# Patient Record
Sex: FEMALE | Race: WHITE | NOT HISPANIC OR LATINO | Employment: OTHER | URBAN - NONMETROPOLITAN AREA
[De-identification: names, ages, dates, MRNs, and addresses within clinical notes are randomized per-mention and may not be internally consistent; named-entity substitution may affect disease eponyms.]

---

## 2017-01-24 ENCOUNTER — APPOINTMENT (OUTPATIENT)
Dept: LAB | Facility: HOSPITAL | Age: 42
End: 2017-01-24

## 2017-01-24 ENCOUNTER — TRANSCRIBE ORDERS (OUTPATIENT)
Dept: ADMINISTRATIVE | Facility: HOSPITAL | Age: 42
End: 2017-01-24

## 2017-01-24 DIAGNOSIS — Z79.899 POLYPHARMACY: Primary | ICD-10-CM

## 2017-01-24 LAB
AMPHET+METHAMPHET UR QL: NEGATIVE
BARBITURATES UR QL SCN: POSITIVE
BENZODIAZ UR QL SCN: NEGATIVE
CANNABINOIDS SERPL QL: NEGATIVE
COCAINE UR QL: NEGATIVE
METHADONE UR QL SCN: NEGATIVE
OPIATES UR QL: NEGATIVE
PCP UR QL SCN: NEGATIVE

## 2017-01-24 PROCEDURE — 80307 DRUG TEST PRSMV CHEM ANLYZR: CPT | Performed by: PSYCHIATRY & NEUROLOGY

## 2017-02-01 ENCOUNTER — LAB (OUTPATIENT)
Dept: LAB | Facility: HOSPITAL | Age: 42
End: 2017-02-01

## 2017-02-01 ENCOUNTER — TRANSCRIBE ORDERS (OUTPATIENT)
Dept: ADMINISTRATIVE | Facility: HOSPITAL | Age: 42
End: 2017-02-01

## 2017-02-01 DIAGNOSIS — Z79.899 ENCOUNTER FOR LONG-TERM (CURRENT) USE OF MEDICATIONS: ICD-10-CM

## 2017-02-01 DIAGNOSIS — Z79.899 ENCOUNTER FOR LONG-TERM (CURRENT) USE OF MEDICATIONS: Primary | ICD-10-CM

## 2017-02-01 LAB
AMPHET+METHAMPHET UR QL: NEGATIVE
BARBITURATES UR QL SCN: NEGATIVE
BENZODIAZ UR QL SCN: NEGATIVE
CANNABINOIDS SERPL QL: NEGATIVE
COCAINE UR QL: NEGATIVE
METHADONE UR QL SCN: NEGATIVE
OPIATES UR QL: NEGATIVE
PCP UR QL SCN: NEGATIVE

## 2017-02-01 PROCEDURE — 80307 DRUG TEST PRSMV CHEM ANLYZR: CPT | Performed by: NURSE PRACTITIONER

## 2017-12-28 ENCOUNTER — TRANSCRIBE ORDERS (OUTPATIENT)
Dept: ADMINISTRATIVE | Facility: HOSPITAL | Age: 42
End: 2017-12-28

## 2017-12-28 DIAGNOSIS — M54.2 NECK PAIN: Primary | ICD-10-CM

## 2018-01-08 ENCOUNTER — HOSPITAL ENCOUNTER (OUTPATIENT)
Dept: MRI IMAGING | Facility: HOSPITAL | Age: 43
Discharge: HOME | End: 2018-01-08
Admitting: NURSE PRACTITIONER

## 2018-01-08 DIAGNOSIS — M54.2 NECK PAIN: ICD-10-CM

## 2018-01-08 PROCEDURE — 72141 MRI NECK SPINE W/O DYE: CPT

## 2018-02-07 ENCOUNTER — TRANSCRIBE ORDERS (OUTPATIENT)
Dept: PHYSICAL THERAPY | Facility: HOSPITAL | Age: 43
End: 2018-02-07

## 2018-02-07 DIAGNOSIS — M54.2 NECK PAIN: Primary | ICD-10-CM

## 2018-02-16 ENCOUNTER — HOSPITAL ENCOUNTER (OUTPATIENT)
Dept: PHYSICAL THERAPY | Facility: HOSPITAL | Age: 43
Setting detail: THERAPIES SERIES
Discharge: HOME | End: 2018-02-16

## 2018-02-16 DIAGNOSIS — M54.2 NECK PAIN: Primary | ICD-10-CM

## 2018-02-16 PROCEDURE — 97161 PT EVAL LOW COMPLEX 20 MIN: CPT | Performed by: PHYSICAL THERAPIST
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
## 2018-02-26 ENCOUNTER — HOSPITAL ENCOUNTER (OUTPATIENT)
Dept: PHYSICAL THERAPY | Facility: HOSPITAL | Age: 43
Setting detail: THERAPIES SERIES
Discharge: HOME | End: 2018-02-26

## 2018-02-26 DIAGNOSIS — M54.2 NECK PAIN: Primary | ICD-10-CM

## 2018-02-26 PROCEDURE — 97140 MANUAL THERAPY 1/> REGIONS: CPT

## 2018-02-26 NOTE — THERAPY TREATMENT NOTE
Outpatient Physical Therapy Ortho Treatment Note  [facility]     Patient Name: Maribel Whitaker  : 1975  MRN: [id number]  Today's Date: 2018      Visit Date: 2018    Visit Dx:    ICD-10-CM ICD-9-CM   1. Neck pain M54.2 723.1       Patient Active Problem List   Diagnosis   • Acute suppurative otitis media of right ear without spontaneous rupture of tympanic membrane   • Candida, oral        Past Medical History:   Diagnosis Date   • Acid reflux    • ADHD (attention deficit hyperactivity disorder)    • Allergic    • Anxiety    • Arthritis    • Depression    • Fibromyalgia    • Hypertension    • Migraines         Past Surgical History:   Procedure Laterality Date   •  SECTION     • CHOLECYSTECTOMY                               PT Assessment/Plan       18 1434       PT Assessment    Assessment Comments No goals have been achieved at this time; however, this is first visit since [facility]. Pt reports increased difficutly with turing her head to the left and rated her pain at a 5/10 pre treatment. With soft tissue mobilizations pt only able to tolerate very light pressure due to increased gaurding. This gaurding carried over to B UT and cervical region as well. Treatment also focused on improving cervicothoracic mobility. Pt very tender with prone extension mobilizations along upper and mid thoracic. More time spent on L ext mobs due to increased rotation in the upper thoracic. Pt educated on posture due to increased rounded shoudlers and hyperkyphotic posture.   -TR     PT Plan    PT Plan Comments Continue to focus on muscle relaxation as well as addressing left upper thoracic ROM.   -TR       User Key  (r) = Recorded By, (t) = Taken By, (c) = Cosigned By    Initials Name Provider Type    FLY Encinas PTA Physical Therapy Assistant                    Exercises       18 1431          Subjective Comments    Subjective Comments Pt reports continued pressure in her neck  as well as increased pain with rotation to the L.   -TR      Subjective Pain    Able to rate subjective pain? yes  -TR      Pre-Treatment Pain Level 4  -TR      Post-Treatment Pain Level 7  -TR        User Key  (r) = Recorded By, (t) = Taken By, (c) = Cosigned By    Initials Name Provider Type    TR Slime Encinas PTA Physical Therapy Assistant                        Manual Rx (last 36 hours)      Manual Treatments       02/26/18 1434          Manual Rx 1    Manual Rx 1 Location Mid-Upper thoracic   -TR      Manual Rx 1 Type IASTM with blue foam roller in prone   -TR      Manual Rx 1 Grade min   -TR      Manual Rx 1 Duration 8  -TR      Manual Rx 2    Manual Rx 2 Location Upper thoracic   -TR      Manual Rx 2 Type L unilateral PA's in prone   -TR      Manual Rx 2 Grade 1-2  -TR      Manual Rx 2 Duration 5  -TR      Manual Rx 3    Manual Rx 3 Location Mid-Upper thoracic   -TR      Manual Rx 3 Type extension mobilizations in prone   -TR      Manual Rx 3 Grade 1-2  -TR      Manual Rx 3 Duration 8  -TR      Manual Rx 4    Manual Rx 4 Location B UT and cervical paraspinals   -TR      Manual Rx 4 Type STM   -TR      Manual Rx 4 Grade min  -TR      Manual Rx 4 Duration 4  -TR      Manual Rx 5    Manual Rx 5 Location Cervical   -TR      Manual Rx 5 Type Traction and suboccipital release   -TR      Manual Rx 5 Grade 2  -TR      Manual Rx 5 Duration 10  -TR      Manual Rx 6    Manual Rx 6 Location Cervical   -TR      Manual Rx 6 Type PROM in B rotation   -TR      Manual Rx 6 Grade min  -TR      Manual Rx 6 Duration 3  -TR      Manual Rx 7    Manual Rx 7 Location Thoracic towel roll stretch   -TR      Manual Rx 7 Duration 3  -TR        User Key  (r) = Recorded By, (t) = Taken By, (c) = Cosigned By    Initials Name Provider Type    FLY Encinas PTA Physical Therapy Assistant                PT OP Goals       02/26/18 1434       Long Term Goals    LTG Date to Achieve 03/16/18  -TR     LTG 1 Full and symmetrical  active cervical rotation  -TR     LTG 1 Progress Ongoing  -TR     LTG 1 Progress Comments Limited today with L worse than R   -TR     LTG 2 Cervical active extension to 75%  -TR     LTG 2 Progress Ongoing  -TR     LTG 3 No neck pain except occasional minor twinges no more than 1-2/10  -TR     LTG 3 Progress Ongoing  -TR     LTG 4 Ind with HEP for posture and flexibility  -TR     LTG 4 Progress Ongoing  -TR     Time Calculation    PT Goal Re-Cert Due Date 03/18/18  -TR       User Key  (r) = Recorded By, (t) = Taken By, (c) = Cosigned By    Initials Name Provider Type    TR Slime Encinas PTA Physical Therapy Assistant          Therapy Education  Given: HEP  Program: Reinforced  How Provided: Verbal  Provided to: Patient  Level of Understanding: Verbalized              Time Calculation:   Start Time: 1434  Stop Time: 1517  Time Calculation (min): 43 min  Total Timed Code Minutes- PT: 43 minute(s)    Therapy Charges for Today     Code Description Service Date Service Provider Modifiers Qty    41023248840 HC PT MANUAL THERAPY EA 15 MIN 2/26/2018 Slime Encinas PTA GP 3                    Slime Encinas PTA  2/26/2018

## 2018-02-28 ENCOUNTER — HOSPITAL ENCOUNTER (OUTPATIENT)
Dept: PHYSICAL THERAPY | Facility: HOSPITAL | Age: 43
Setting detail: THERAPIES SERIES
Discharge: HOME | End: 2018-02-28

## 2018-02-28 DIAGNOSIS — M54.2 NECK PAIN: Primary | ICD-10-CM

## 2018-02-28 PROCEDURE — 97110 THERAPEUTIC EXERCISES: CPT

## 2018-02-28 PROCEDURE — 97140 MANUAL THERAPY 1/> REGIONS: CPT

## 2018-03-05 ENCOUNTER — HOSPITAL ENCOUNTER (OUTPATIENT)
Dept: PHYSICAL THERAPY | Facility: HOSPITAL | Age: 43
Setting detail: THERAPIES SERIES
Discharge: HOME | End: 2018-03-05

## 2018-03-05 DIAGNOSIS — M54.2 NECK PAIN: Primary | ICD-10-CM

## 2018-03-05 PROCEDURE — 97110 THERAPEUTIC EXERCISES: CPT

## 2018-03-05 PROCEDURE — 97140 MANUAL THERAPY 1/> REGIONS: CPT

## 2018-03-05 NOTE — THERAPY TREATMENT NOTE
Outpatient Physical Therapy Ortho Treatment Note  [facility]     Patient Name: Maribel Whitaker  : 1975  MRN: [id number]  Today's Date: 3/5/2018      Visit Date: 2018    Visit Dx:    ICD-10-CM ICD-9-CM   1. Neck pain M54.2 723.1       Patient Active Problem List   Diagnosis   • Acute suppurative otitis media of right ear without spontaneous rupture of tympanic membrane   • Candida, oral        Past Medical History:   Diagnosis Date   • Acid reflux    • ADHD (attention deficit hyperactivity disorder)    • Allergic    • Anxiety    • Arthritis    • Depression    • Fibromyalgia    • Hypertension    • Migraines         Past Surgical History:   Procedure Laterality Date   •  SECTION     • CHOLECYSTECTOMY                               PT Assessment/Plan       18 1347       PT Assessment    Assessment Comments Pt continues to have increased pain as well as stiffness throughout cervical and thoracic spine.  Pt was very gaurded today and not able to tolerate PROM to improve cervical rotation. Attempted light strengthening of scapular muscels however pt had slight increase in pain.   -TR     PT Plan    PT Plan Comments Continue with manual techniques and decreasing gaurding.   -TR       User Key  (r) = Recorded By, (t) = Taken By, (c) = Cosigned By    Initials Name Provider Type    TR Slime Encinas PTA Physical Therapy Assistant                    Exercises       18 1347          Subjective Pain    Able to rate subjective pain? yes  -TR      Pre-Treatment Pain Level 4  -TR      Post-Treatment Pain Level 6  -TR      Subjective Pain Comment B shoulders and to mid forearm biilaterally.   -TR      Exercise 1    Exercise Name 1 Hooklying towel stretch   -TR      Cueing 1 Verbal  -TR      Time (Minutes) 1 3  -TR      Exercise 2    Exercise Name 2 Hooklying B UE horizontal abduction with yellow T-band  -TR      Cueing 2 Verbal  -TR      Sets 2 2  -TR      Reps 2 10  -TR      Additional  Comments Limited ROM due to pain   -TR      Exercise 3    Exercise Name 3 Seated UE phasic no band   -TR      Cueing 3 Verbal;Tactile  -TR      Sets 3 1  -TR      Reps 3 10  -TR      Additional Comments Tactile cues for proper form   -TR      Exercise 4    Exercise Name 4 B pec stretch at doorway  -TR      Cueing 4 Verbal;Demo  -TR      Sets 4 3  -TR      Reps 4 20  -TR      Additional Comments Added to HEP   -TR        User Key  (r) = Recorded By, (t) = Taken By, (c) = Cosigned By    Initials Name Provider Type    TR Slime Encinas PTA Physical Therapy Assistant                        Manual Rx (last 36 hours)      Manual Treatments       03/05/18 1347          Manual Rx 1    Manual Rx 1 Location  thoracic   -TR      Manual Rx 1 Type IASTM with blue foam roller in prone   -TR      Manual Rx 1 Grade min   -TR      Manual Rx 1 Duration 8  -TR      Manual Rx 2    Manual Rx 2 Location B UT and LS  -TR      Manual Rx 2 Type STM  -TR      Manual Rx 2 Grade min  -TR      Manual Rx 2 Duration 5  -TR      Manual Rx 3    Manual Rx 3 Location CT and Upper thoracic   -TR      Manual Rx 3 Type extension mobilizations in prone   -TR      Manual Rx 3 Grade 1-2 sustained  -TR      Manual Rx 3 Duration 5  -TR      Manual Rx 4    Manual Rx 4 Location suboccipital release with STM and gentle traction  -TR      Manual Rx 4 Grade 1-2 sustained  -TR      Manual Rx 4 Duration 5  -TR      Manual Rx 5    Manual Rx 5 Location lower cervical traction  -TR      Manual Rx 5 Type manual traction   -TR      Manual Rx 5 Grade 1-2  -TR      Manual Rx 5 Duration 5  -TR      Manual Rx 6    Manual Rx 6 Location --  -TR      Manual Rx 6 Type --  -TR      Manual Rx 6 Grade --  -TR        User Key  (r) = Recorded By, (t) = Taken By, (c) = Cosigned By    Initials Name Provider Type    TR Slime Encinas PTA Physical Therapy Assistant                PT OP Goals       03/05/18 1347       Long Term Goals    LTG Date to Achieve 03/16/18  -TR      LTG 1 Full and symmetrical active cervical rotation  -TR     LTG 1 Progress Ongoing  -TR     LTG 1 Progress Comments Pt too gaurded with PROM today   -TR     LTG 2 Cervical active extension to 75%  -TR     LTG 2 Progress Ongoing  -TR     LTG 3 No neck pain except occasional minor twinges no more than 1-2/10  -TR     LTG 3 Progress Ongoing  -TR     LTG 4 Ind with HEP for posture and flexibility  -TR     LTG 4 Progress Ongoing  -TR     Time Calculation    PT Goal Re-Cert Due Date 03/18/18  -TR       User Key  (r) = Recorded By, (t) = Taken By, (c) = Cosigned By    Initials Name Provider Type    TR Slime Encinas PTA Physical Therapy Assistant          Therapy Education  Education Details: Doorway pec stretch   Given: HEP  Program: New  How Provided: Verbal, Demonstration  Provided to: Patient  Level of Understanding: Verbalized, Demonstrated              Time Calculation:   Start Time: 1347  Stop Time: 1430  Time Calculation (min): 43 min  Total Timed Code Minutes- PT: 43 minute(s)    Therapy Charges for Today     Code Description Service Date Service Provider Modifiers Qty    71738742877 HC PT MANUAL THERAPY EA 15 MIN 3/5/2018 Slime Encinas PTA GP 2    17300424923 HC PT THER PROC EA 15 MIN 3/5/2018 Slime Encinas PTA GP 1                    Slime Encinas PTA  3/5/2018

## 2018-03-07 ENCOUNTER — HOSPITAL ENCOUNTER (OUTPATIENT)
Dept: PHYSICAL THERAPY | Facility: HOSPITAL | Age: 43
Setting detail: THERAPIES SERIES
Discharge: HOME | End: 2018-03-07

## 2018-03-07 DIAGNOSIS — M54.2 NECK PAIN: Primary | ICD-10-CM

## 2018-03-07 PROCEDURE — 97140 MANUAL THERAPY 1/> REGIONS: CPT

## 2018-03-07 PROCEDURE — 97110 THERAPEUTIC EXERCISES: CPT

## 2018-03-07 NOTE — THERAPY TREATMENT NOTE
Outpatient Physical Therapy Ortho Treatment Note  [facility]     Patient Name: Maribel Whitaker  : 1975  MRN: [id number]  Today's Date: 3/7/2018      Visit Date: 2018    Visit Dx:    ICD-10-CM ICD-9-CM   1. Neck pain M54.2 723.1       Patient Active Problem List   Diagnosis   • Acute suppurative otitis media of right ear without spontaneous rupture of tympanic membrane   • Candida, oral        Past Medical History:   Diagnosis Date   • Acid reflux    • ADHD (attention deficit hyperactivity disorder)    • Allergic    • Anxiety    • Arthritis    • Depression    • Fibromyalgia    • Hypertension    • Migraines         Past Surgical History:   Procedure Laterality Date   •  SECTION     • CHOLECYSTECTOMY                               PT Assessment/Plan       18 1431       PT Assessment    Assessment Comments Pt reports feeling better today pre treatment; however, tingling and burning continues in BUE down into her fingertips. Pt reports  increased pain with manual techniques including cervical traction and thoracic extension mobilization post treatment. Pt's pain also increased to a 6 post treatment. Pt continues to present with poor posture and rounded shoulders which continues to exacerbate her problems.   -TR     PT Plan    PT Plan Comments Continue with manual techniques to improve mobility. Focus on techniques to improve alignment in upper thoracic and left upper thoracic rotation as tolerated.   -TR       User Key  (r) = Recorded By, (t) = Taken By, (c) = Cosigned By    Initials Name Provider Type    FLY Encinas PTA Physical Therapy Assistant                    Exercises       18 1431          Subjective Comments    Subjective Comments Pt reports some soreness last night however reports improvement overall.   -TR      Subjective Pain    Able to rate subjective pain? yes  -TR      Pre-Treatment Pain Level 3  -TR      Post-Treatment Pain Level 6  -TR      Subjective  Pain Comment B shoulder with constant burning/tingling into B arms and fingertips   -TR      Exercise 1    Exercise Name 1 Hooklying towel stretch   -TR      Cueing 1 Verbal  -TR      Time (Minutes) 1 3  -TR      Exercise 2    Exercise Name 2 Hooklying B UE horizontal abduction with yellow T-band  -TR      Cueing 2 Verbal  -TR      Sets 2 2  -TR      Reps 2 10  -TR      Additional Comments Better ROM today  -TR      Exercise 3    Exercise Name 3 Hooklying BUE diagonals   -TR      Cueing 3 Verbal;Tactile  -TR      Sets 3 2  -TR      Reps 3 10  -TR        User Key  (r) = Recorded By, (t) = Taken By, (c) = Cosigned By    Initials Name Provider Type    TR Slime Encinas PTA Physical Therapy Assistant                        Manual Rx (last 36 hours)      Manual Treatments       03/07/18 1431          Manual Rx 1    Manual Rx 1 Location  thoracic   -TR      Manual Rx 1 Type IASTM with blue foam roller in prone   -TR      Manual Rx 1 Grade min   -TR      Manual Rx 1 Duration 8  -TR      Manual Rx 2    Manual Rx 2 Location --  -TR      Manual Rx 2 Type --  -TR      Manual Rx 2 Grade --  -TR      Manual Rx 2 Duration --  -TR      Manual Rx 3    Manual Rx 3 Location CT and Upper thoracic   -TR      Manual Rx 3 Type extension mobilizations in prone   -TR      Manual Rx 3 Grade 2-3 sustained  -TR      Manual Rx 3 Duration 12  -TR      Manual Rx 4    Manual Rx 4 Location suboccipital release with STM and gentle traction  -TR      Manual Rx 4 Grade 2 sustained  -TR      Manual Rx 4 Duration 7  -TR      Manual Rx 5    Manual Rx 5 Location lower cervical traction  -TR      Manual Rx 5 Type manual traction   -TR      Manual Rx 5 Grade 1-2  -TR      Manual Rx 5 Duration 7  -TR        User Key  (r) = Recorded By, (t) = Taken By, (c) = Cosigned By    Initials Name Provider Type    TR Slime Encinas PTA Physical Therapy Assistant                PT OP Goals       03/07/18 1431 03/07/18 1400    Long Term Goals    LTG Date to  Achieve  03/16/18  -TR    LTG 1  Full and symmetrical active cervical rotation  -TR    LTG 1 Progress  Ongoing  -TR    LTG 2  Cervical active extension to 75%  -TR    LTG 2 Progress  Ongoing  -TR    LTG 3  No neck pain except occasional minor twinges no more than 1-2/10  -TR    LTG 3 Progress  Ongoing  -TR    LTG 3 Progress Comments  3/10 pre treatment today   -TR    LTG 4  Ind with HEP for posture and flexibility  -TR    LTG 4 Progress  Ongoing  -TR    Time Calculation    PT Goal Re-Cert Due Date 03/15/18  -TR 03/18/18  -TR      User Key  (r) = Recorded By, (t) = Taken By, (c) = Cosigned By    Initials Name Provider Type    TR Slime Encinas PTA Physical Therapy Assistant          Therapy Education  Given: HEP, Symptoms/condition management  Program: Reinforced  How Provided: Verbal  Provided to: Patient  Level of Understanding: Verbalized              Time Calculation:   Start Time: 1431  Stop Time: 1515  Time Calculation (min): 44 min  Total Timed Code Minutes- PT: 44 minute(s)    Therapy Charges for Today     Code Description Service Date Service Provider Modifiers Qty    26380633565 HC PT MANUAL THERAPY EA 15 MIN 3/7/2018 Slime Encinas PTA GP 2    83281191399 HC PT THER PROC EA 15 MIN 3/7/2018 Slime Encinas PTA GP 1                    Slime Encinas PTA  3/7/2018

## 2018-03-09 ENCOUNTER — HOSPITAL ENCOUNTER (OUTPATIENT)
Dept: PHYSICAL THERAPY | Facility: HOSPITAL | Age: 43
Setting detail: THERAPIES SERIES
Discharge: HOME | End: 2018-03-09

## 2018-03-09 DIAGNOSIS — M54.2 NECK PAIN: Primary | ICD-10-CM

## 2018-03-09 PROCEDURE — 97140 MANUAL THERAPY 1/> REGIONS: CPT | Performed by: PHYSICAL THERAPIST

## 2018-03-09 NOTE — THERAPY TREATMENT NOTE
Outpatient Physical Therapy Orth[facility] Treatment Note  [facility]     Patient Name: Maribel Whitaker  : 1975  MRN: [id number]  Today's Date: 3/9/2018      Visit Date: 2018    Visit Dx:    ICD-10-CM ICD-9-CM   1. Neck pain M54.2 723.1       Patient Active Problem List   Diagnosis   • Acute suppurative otitis media of right ear without spontaneous rupture of tympanic membrane   • Candida, oral        Past Medical History:   Diagnosis Date   • Acid reflux    • ADHD (attention deficit hyperactivity disorder)    • Allergic    • Anxiety    • Arthritis    • Depression    • Fibromyalgia    • Hypertension    • Migraines         Past Surgical History:   Procedure Laterality Date   •  SECTION     • CHOLECYSTECTOMY                               PT Assessment/Plan       18 1300       PT Assessment    Assessment Comments She feels like she continues to improve. She still tends to rest in a kyphotic posture. The importance of posture was emphasized today in order to keep the strain off her neck.  -TB     PT Plan    PT Plan Comments Cont to progress thoracic flexibility and postural stability.  -TB       User Key  (r) = Recorded By, (t) = Taken By, (c) = Cosigned By    Initials Name Provider Type    PRANAV Fatima, PT Physical Therapist                    Exercises       18 1300          Subjective Comments    Subjective Comments She feels like she's doing well. Overall, she feels better with more flexibility.  -TB      Subjective Pain    Pre-Treatment Pain Level 2  -TB        User Key  (r) = Recorded By, (t) = Taken By, (c) = Cosigned By    Initials Name Provider Type    PRANAV Fatima, PT Physical Therapist                        Manual Rx (last 36 hours)      Manual Treatments       18 1300          Manual Rx 1    Manual Rx 1 Location  thoracic   -TB      Manual Rx 1 Type ext mob with foam roll  -TB      Manual Rx 1 Grade mod  -TB      Manual Rx 1 Duration 8  -TB      Manual Rx 2     Manual Rx 2 Location B UT and LS  -TB      Manual Rx 2 Type STM  -TB      Manual Rx 2 Grade min  -TB      Manual Rx 2 Duration 10  -TB      Manual Rx 3    Manual Rx 3 Location CT and Upper thoracic   -TB      Manual Rx 3 Type extension mobilizations in prone   -TB      Manual Rx 3 Grade 3 sustained  -TB      Manual Rx 3 Duration 12  -TB      Manual Rx 4    Manual Rx 4 Location suboccipital release with STM and gentle traction  -TB      Manual Rx 4 Type STM   -TB      Manual Rx 4 Grade 2 sustained  -TB      Manual Rx 4 Duration 7  -TB      Manual Rx 5    Manual Rx 5 Location lower cervical traction  -TB      Manual Rx 5 Type manual traction   -TB      Manual Rx 5 Grade 1-2  -TB      Manual Rx 5 Duration 7  -TB      Manual Rx 6    Manual Rx 6 Location --  -TB      Manual Rx 6 Type --  -TB      Manual Rx 6 Grade --  -TB      Manual Rx 6 Duration --  -TB      Manual Rx 7    Manual Rx 7 Location --  -TB      Manual Rx 7 Duration --  -TB        User Key  (r) = Recorded By, (t) = Taken By, (c) = Cosigned By    Initials Name Provider Type    PRANAV Fatima, PT Physical Therapist                PT OP Goals       03/09/18 1300       Long Term Goals    LTG Date to Achieve 03/16/18  -TB     LTG 1 Full and symmetrical active cervical rotation  -TB     LTG 1 Progress Ongoing  -TB     LTG 2 Cervical active extension to 75%  -TB     LTG 2 Progress Ongoing  -TB     LTG 3 No neck pain except occasional minor twinges no more than 1-2/10  -TB     LTG 3 Progress Ongoing  -TB     LTG 3 Progress Comments 2/10 today  -TB     LTG 4 Ind with HEP for posture and flexibility  -TB     LTG 4 Progress Ongoing  -TB     LTG 4 Progress Comments emphasized W's and posture  -TB     Time Calculation    PT Goal Re-Cert Due Date 03/15/18  -TB       User Key  (r) = Recorded By, (t) = Taken By, (c) = Cosigned By    Initials Name Provider Type    PRANAV Fatima, PT Physical Therapist          Therapy Education  Education Details: posture, Ws  Given:  HEP, Symptoms/condition management  Program: Reinforced  How Provided: Verbal  Provided to: Patient  Level of Understanding: Verbalized              Time Calculation:   Start Time: 1300  Stop Time: 1345  Time Calculation (min): 45 min  Total Timed Code Minutes- PT: 45 minute(s)    Therapy Charges for Today     Code Description Service Date Service Provider Modifiers Qty    74526370992 HC PT MANUAL THERAPY EA 15 MIN 3/9/2018 Dedrick Fatima, PT GP 3                    Dedrick Fatima, PT  3/9/2018

## 2018-03-12 ENCOUNTER — HOSPITAL ENCOUNTER (OUTPATIENT)
Dept: PHYSICAL THERAPY | Facility: HOSPITAL | Age: 43
Setting detail: THERAPIES SERIES
Discharge: HOME | End: 2018-03-12

## 2018-03-12 DIAGNOSIS — M54.2 NECK PAIN: Primary | ICD-10-CM

## 2018-03-12 PROCEDURE — 97110 THERAPEUTIC EXERCISES: CPT | Performed by: PHYSICAL THERAPIST

## 2018-03-12 PROCEDURE — 97140 MANUAL THERAPY 1/> REGIONS: CPT | Performed by: PHYSICAL THERAPIST

## 2018-03-12 NOTE — THERAPY PROGRESS REPORT/RE-CERT
Outpatient Physical Therapy Ortho Progress Note  [facility]     Patient Name: Maribel Whitaker  : 1975  MRN: [id number]  Today's Date: 3/12/2018      Visit Date: 2018    Visit Dx:    ICD-10-CM ICD-9-CM   1. Neck pain M54.2 723.1       Patient Active Problem List   Diagnosis   • Acute suppurative otitis media of right ear without spontaneous rupture of tympanic membrane   • Candida, oral        Past Medical History:   Diagnosis Date   • Acid reflux    • ADHD (attention deficit hyperactivity disorder)    • Allergic    • Anxiety    • Arthritis    • Depression    • Fibromyalgia    • Hypertension    • Migraines         Past Surgical History:   Procedure Laterality Date   •  SECTION     • CHOLECYSTECTOMY                               PT Assessment/Plan     Row Name 18 1300          PT Assessment    Functional Limitations Limitation in home management;Performance in leisure activities  -TB     Impairments Pain;Joint mobility;Impaired flexibility;Posture;Range of motion  -TB     Assessment Comments Overall, she is better with pain less intense and less frequent. Our emphasis has been on improving her posture with mobilizations and exercises. She still tends to be severely kyphotic.  -TB     Rehab Potential Good  -TB     Patient/caregiver participated in establishment of treatment plan and goals Yes  -TB     Patient would benefit from skilled therapy intervention Yes  -TB        PT Plan    PT Frequency 2x/week;3x/week  -TB     Predicted Duration of Therapy Intervention (OT Eval) 4 weeks  -TB     Planned CPT's? PT THER PROC EA 15 MIN: 51655;PT MANUAL THERAPY EA 15 MIN: 91361;PT ELECTRICAL STIM UNATTEND: ;PT ELECTRICAL STIM ATTD EA 15 MIN: 29774;PT ULTRASOUND EA 15 MIN: 46406;PT TRACTION CERVICAL: 06026  -TB     PT Plan Comments We will continue to promote improved posture and mobility with mobilizations and flexibility and stability exs.  -TB        Clinical Impression    Predicted Duration  "of Therapy Intervention (days/wks) 4 weeks  -TB       User Key  (r) = Recorded By, (t) = Taken By, (c) = Cosigned By    Initials Name Provider Type    TB Dedrick Fatima, PT Physical Therapist                    Exercises     Row Name 03/12/18 1300             Subjective Comments    Subjective Comments She says she is feeling \"pretty good\" with her pain mostly in her lower neck and occiput.  -TB         Subjective Pain    Pre-Treatment Pain Level 3  -TB         Exercise 1    Exercise Name 1 addressed goals  -TB      Time 1 5  -TB         Exercise 2    Exercise Name 2 went over HEP for standing thoracic and pect stretch  -TB      Time 2 3  -TB        User Key  (r) = Recorded By, (t) = Taken By, (c) = Cosigned By    Initials Name Provider Type    TB Dedrick Fatima, PT Physical Therapist                        Manual Rx (last 36 hours)      Manual Treatments     Row Name 03/12/18 1305             Manual Rx 1    Manual Rx 1 Location  thoracic   -TB      Manual Rx 1 Type ext mob with foam roll  -TB      Manual Rx 1 Grade mod  -TB      Manual Rx 1 Duration 8  -TB         Manual Rx 2    Manual Rx 2 Location B UT and LS  -TB      Manual Rx 2 Type STM  -TB      Manual Rx 2 Grade min  -TB      Manual Rx 2 Duration 10  -TB         Manual Rx 3    Manual Rx 3 Location CT and Upper thoracic   -TB      Manual Rx 3 Type extension mobilizations in prone   -TB      Manual Rx 3 Grade 3 sustained  -TB      Manual Rx 3 Duration 12  -TB         Manual Rx 4    Manual Rx 4 Location --  -TB      Manual Rx 4 Type --  -TB      Manual Rx 4 Grade --  -TB      Manual Rx 4 Duration --  -TB         Manual Rx 5    Manual Rx 5 Location --  -TB      Manual Rx 5 Type --  -TB      Manual Rx 5 Grade --  -TB      Manual Rx 5 Duration --  -TB         Manual Rx 6    Manual Rx 6 Location --  -TB      Manual Rx 6 Type --  -TB      Manual Rx 6 Grade --  -TB      Manual Rx 6 Duration --  -TB         Manual Rx 7    Manual Rx 7 Location --  -TB      Manual Rx 7 " Duration 3  -TB        User Key  (r) = Recorded By, (t) = Taken By, (c) = Cosigned By    Initials Name Provider Type    TB Dedrick Fatima, PT Physical Therapist                PT OP Goals     Row Name 03/12/18 1300          Long Term Goals    LTG Date to Achieve 03/16/18  -TB     LTG 1 Full and symmetrical active cervical rotation  -TB     LTG 1 Progress Ongoing  -TB     LTG 1 Progress Comments Right rotation: 90%; left rotation 80% with no pain  -TB     LTG 2 Cervical active extension to 75%  -TB     LTG 2 Progress Ongoing  -TB     LTG 2 Progress Comments 505  -TB     LTG 3 No neck pain except occasional minor twinges no more than 1-2/10  -TB     LTG 3 Progress Ongoing  -TB     LTG 3 Progress Comments 3/10 today; feels pain 50-60%  -TB     LTG 4 Ind with HEP for posture and flexibility  -TB     LTG 4 Progress Ongoing  -TB     LTG 4 Progress Comments emphasizing posture and flexibility through her chest and thoracic  -TB        Time Calculation    PT Goal Re-Cert Due Date 04/11/18  -TB       User Key  (r) = Recorded By, (t) = Taken By, (c) = Cosigned By    Initials Name Provider Type    TB Dedrick Fatima, PT Physical Therapist          Therapy Education  Given: HEP, Symptoms/condition management  Program: Reinforced  How Provided: Verbal  Provided to: Patient  Level of Understanding: Verbalized              Time Calculation:        Therapy Charges for Today     Code Description Service Date Service Provider Modifiers Qty    22634053635 HC PT THER PROC EA 15 MIN 3/12/2018 Dedrick Fatima, PT GP 1    22226592674 HC PT MANUAL THERAPY EA 15 MIN 3/12/2018 Dedrick Fatima, PT GP 2                    Dedrick Fatima, PT  3/12/2018

## 2018-03-14 ENCOUNTER — APPOINTMENT (OUTPATIENT)
Dept: PHYSICAL THERAPY | Facility: HOSPITAL | Age: 43
End: 2018-03-14

## 2018-03-19 ENCOUNTER — APPOINTMENT (OUTPATIENT)
Dept: PHYSICAL THERAPY | Facility: HOSPITAL | Age: 43
End: 2018-03-19

## 2018-03-19 ENCOUNTER — HOSPITAL ENCOUNTER (OUTPATIENT)
Dept: PHYSICAL THERAPY | Facility: HOSPITAL | Age: 43
Setting detail: THERAPIES SERIES
Discharge: HOME | End: 2018-03-19

## 2018-03-19 DIAGNOSIS — M54.2 NECK PAIN: Primary | ICD-10-CM

## 2018-03-19 PROCEDURE — 97140 MANUAL THERAPY 1/> REGIONS: CPT

## 2018-03-19 NOTE — THERAPY TREATMENT NOTE
Outpatient Physical Therapy Ortho Treatment Note  [facility]     Patient Name: Maribel Whitaker  : 1975  MRN: [id number]  Today's Date: 3/19/2018      Visit Date: 2018    Visit Dx:    ICD-10-CM ICD-9-CM   1. Neck pain M54.2 723.1       Patient Active Problem List   Diagnosis   • Acute suppurative otitis media of right ear without spontaneous rupture of tympanic membrane   • Candida, oral        Past Medical History:   Diagnosis Date   • Acid reflux    • ADHD (attention deficit hyperactivity disorder)    • Allergic    • Anxiety    • Arthritis    • Depression    • Fibromyalgia    • Hypertension    • Migraines         Past Surgical History:   Procedure Laterality Date   •  SECTION     • CHOLECYSTECTOMY                               PT Assessment/Plan     Row Name 18 1345 18 1318       PT Assessment    Assessment Comments Pt was 45 minutes early to her appointment due to scheduling error; therefore, pt was seen 30 minutes by delio gardner and then 20 by Me. Last part of treatment focused on scapular strengthening and improving posture. Pt required assist with serratus punches as well as seated UE pahsic due to decreased strength as well as poor control and form. Post treatment pt rated her pain at a 3/10.   -TR Patient reports she continues to have intermittent episodes where she feels her neck lock ups on her during rotation, which lasted about 30 seconds.  She does continue to have decreased mobility in the cervicothoracic region with increased guarding with PROM.    Manual techniques did not relieve her symptoms today, but she did report slight increase of pain following traction today.  -ZHANE       PT Plan    PT Plan Comments  -- We will continue to work on cervicothoracic mobility.  We will also progress with more of active approach for poture moving forward.   -ZHANE      User Key  (r) = Recorded By, (t) = Taken By, (c) = Cosigned By    Initials Name Provider Type    ZHANE WEN  HELLEN Rhoades Physical Therapy Assistant    TR Slime Encinas PTA Physical Therapy Assistant                    Exercises     Row Name 03/19/18 1922             Subjective Comments    Subjective Comments Patient reports when she turns to either direction, she feels her neck locks up and she can't move for about 30 secons.  Then she gradually starts moving again. She reports this happens intermittently.  However, she feels her flexibilty is improving.  -ZHANE         Subjective Pain    Able to rate subjective pain? yes  -ZHANE      Pre-Treatment Pain Level 5  -ZHANE      Post-Treatment Pain Level 3  -TR         Exercise 1    Exercise Name 1 Hooklying towel stretch   -TR      Cueing 1 Verbal  -TR      Time 1 5  -TR      Additional Comments min OP  -TR         Exercise 2    Exercise Name 2 Hooklying B UE horizontal abduction with yellow T-band  -TR      Cueing 2 Verbal  -TR      Sets 2 2  -TR      Reps 2 15  -TR         Exercise 3    Exercise Name 3 Seated UE phasic with no band   -TR      Cueing 3 Verbal  -TR      Sets 3 2  -TR      Reps 3 10  -TR         Exercise 4    Exercise Name 4 Seated W's  -TR      Cueing 4 Verbal  -TR      Sets 4 2  -TR      Reps 4 10  -TR      Additional Comments Reviewed for HEP   -TR         Exercise 5    Exercise Name 5 B serratus punches in supine  -TR      Cueing 5 Verbal  -TR      Sets 5 2  -TR      Reps 5 10 BUE  -TR        User Key  (r) = Recorded By, (t) = Taken By, (c) = Cosigned By    Initials Name Provider Type    ZHANE Rhoades PTA Physical Therapy Assistant    TR Slime Encinas PTA Physical Therapy Assistant                        Manual Rx (last 36 hours)      Manual Treatments     Row Name 03/19/18 1321             Manual Rx 1    Manual Rx 1 Location B UT and LS  -ZHANE      Manual Rx 1 Type STM  -ZHANE      Manual Rx 1 Grade min  -ZHANE      Manual Rx 1 Duration 6  -ZHANE         Manual Rx 2    Manual Rx 2 Location CT and Upper thoracic   -ZHANE      Manual Rx 2 Type extension  mobilizations in prone   -ZHANE      Manual Rx 2 Grade 2 sustained  -ZHANE      Manual Rx 2 Duration 5  -ZHANE         Manual Rx 3    Manual Rx 3 Location suboccipital release with STM and gentle traction  -ZHANE      Manual Rx 3 Grade 1-2 and min STM  -ZHANE      Manual Rx 3 Duration 5  -ZHANE         Manual Rx 4    Manual Rx 4 Location lower cervical traction  -ZHANE      Manual Rx 4 Type manual traction   -ZHANE      Manual Rx 4 Grade 1-2  -ZHANE      Manual Rx 4 Duration 5   she reported increased pain after treatment  -ZHANE         Manual Rx 5    Manual Rx 5 Location cervical PROM  -ZHANE      Manual Rx 5 Grade x8 reps each  -ZHANE      Manual Rx 5 Duration 2   pt.. had increased guarding with PROM  -ZHANE        User Key  (r) = Recorded By, (t) = Taken By, (c) = Cosigned By    Initials Name Provider Type    ZHANE Rhoades PTA Physical Therapy Assistant                PT OP Goals     Row Name 03/19/18 1345 03/19/18 1318       Long Term Goals    LTG Date to Achieve  -- 03/16/18  -TR    LTG 1  -- Full and symmetrical active cervical rotation  -TR    LTG 1 Progress  -- Ongoing  -TR    LTG 1 Progress Comments  -- She was having pain with PROM cervical rotation due to increased guarding with PROM today  -ZHANE    LTG 2  -- Cervical active extension to 75%  -TR    LTG 2 Progress  -- Ongoing  -TR    LTG 3  -- No neck pain except occasional minor twinges no more than 1-2/10  -TR    LTG 3 Progress  -- Ongoing  -TR    LTG 4  -- Ind with HEP for posture and flexibility  -TR    LTG 4 Progress  -- Ongoing  -TR    LTG 4 Progress Comments  -- Reviewed HEP today for proper form   -TR       Time Calculation    PT Goal Re-Cert Due Date 04/11/18  -TR 04/11/18  -TR      User Key  (r) = Recorded By, (t) = Taken By, (c) = Cosigned By    Initials Name Provider Type    ZHANE Rhoades PTA Physical Therapy Assistant    FLY Encinas PTA Physical Therapy Assistant          Therapy Education  Education Details: Reviwed for proper form   Given: HEP  Program:  Reinforced  How Provided: Verbal, Demonstration  Provided to: Patient  Level of Understanding: Verbalized, Demonstrated              Time Calculation:   Start Time: 1345  Stop Time: 1406  Time Calculation (min): 21 min  PT Non-Billable Time (min): 2 min  Total Timed Code Minutes- PT: 19 minute(s)    Therapy Charges for Today     Code Description Service Date Service Provider Modifiers Qty    45170046341 HC PT MANUAL THERAPY EA 15 MIN 3/19/2018 Slime Encinas PTA GP 1                    Slime Encinas PTA  3/19/2018

## 2018-03-21 ENCOUNTER — HOSPITAL ENCOUNTER (OUTPATIENT)
Dept: PHYSICAL THERAPY | Facility: HOSPITAL | Age: 43
Setting detail: THERAPIES SERIES
Discharge: HOME | End: 2018-03-21

## 2018-03-21 DIAGNOSIS — M54.2 NECK PAIN: Primary | ICD-10-CM

## 2018-03-21 PROCEDURE — 97140 MANUAL THERAPY 1/> REGIONS: CPT | Performed by: PHYSICAL THERAPIST

## 2018-03-21 PROCEDURE — 97110 THERAPEUTIC EXERCISES: CPT | Performed by: PHYSICAL THERAPIST

## 2018-03-21 NOTE — THERAPY TREATMENT NOTE
Outpatient Physical Therapy Ortho Treatment Note  [facility]     Patient Name: Maribel Whitaker  : 1975  MRN: [id number]  Today's Date: 3/21/2018      Visit Date: 2018    Visit Dx:    ICD-10-CM ICD-9-CM   1. Neck pain M54.2 723.1       Patient Active Problem List   Diagnosis   • Acute suppurative otitis media of right ear without spontaneous rupture of tympanic membrane   • Candida, oral        Past Medical History:   Diagnosis Date   • Acid reflux    • ADHD (attention deficit hyperactivity disorder)    • Allergic    • Anxiety    • Arthritis    • Depression    • Fibromyalgia    • Hypertension    • Migraines         Past Surgical History:   Procedure Laterality Date   •  SECTION     • CHOLECYSTECTOMY                               PT Assessment/Plan     Row Name 18 7386          PT Assessment    Assessment Comments Her posture persists in being very slouched. Even while I was talking with her about posture, she started slouching again. If she can't correct this and improve her flexibility, I'm not sure how far we can progress her. I taped her today to try to cue her to be more upright.  -TB        PT Plan    PT Plan Comments assess effectiveness of tape   -TB       User Key  (r) = Recorded By, (t) = Taken By, (c) = Cosigned By    Initials Name Provider Type    TB Dedrick Fatima, PT Physical Therapist                    Exercises     Row Name 18 2503             Subjective Comments    Subjective Comments She says she is still having pain down her arms.   -TB         Subjective Pain    Pre-Treatment Pain Level 4  -TB         Exercise 1    Exercise Name 1 noted that when she came in and I was asking her pain, she was slouching while sitting. I cued her to sit upright and discussed importance of posture. As I was talking with her, she started slouching again, so I advised her on how this could downwardly spiral if she had to have surgery to relieve her arms and how the other segments  would be at risk then to degenerate. After the mobilizations, I taped her josef upper thor from her UT to about T7 with cover roll and leukotape. She was sittiing in an upright posture with scap retraction to tape her.  -TB      Time 1 10  -TB        User Key  (r) = Recorded By, (t) = Taken By, (c) = Cosigned By    Initials Name Provider Type    PRANAV Fatima PT Physical Therapist                        Manual Rx (last 36 hours)      Manual Treatments     Row Name 03/21/18 1435             Manual Rx 2    Manual Rx 2 Location CT and Upper thoracic   -TB      Manual Rx 2 Type extension mobilizations in prone   -TB      Manual Rx 2 Grade 2 sustained  -TB      Manual Rx 2 Duration 15  -TB         Manual Rx 3    Manual Rx 3 Location suboccipital release and cervical traction  -TB      Manual Rx 3 Type extension mobilizations in prone   -TB      Manual Rx 3 Grade 1-2 and min STM  -TB      Manual Rx 3 Duration 15  -TB         Manual Rx 7    Manual Rx 7 Duration 3  -TB        User Key  (r) = Recorded By, (t) = Taken By, (c) = Cosigned By    Initials Name Provider Type    PRANAV Fatima PT Physical Therapist                PT OP Goals     Row Name 03/21/18 1435          Long Term Goals    LTG Date to Achieve 03/16/18  -TB     LTG 1 Full and symmetrical active cervical rotation  -TB     LTG 1 Progress Ongoing  -TB     LTG 2 Cervical active extension to 75%  -TB     LTG 2 Progress Ongoing  -TB     LTG 3 No neck pain except occasional minor twinges no more than 1-2/10  -TB     LTG 3 Progress Ongoing  -TB     LTG 3 Progress Comments still with pain 5/10  -TB     LTG 4 Ind with HEP for posture and flexibility  -TB     LTG 4 Progress Ongoing  -TB        Time Calculation    PT Goal Re-Cert Due Date 04/11/18  -TB       User Key  (r) = Recorded By, (t) = Taken By, (c) = Cosigned By    Initials Name Provider Type    PRANAV Fatima PT Physical Therapist          Therapy Education  Given: HEP  Program: Reinforced  How  Provided: Verbal, Demonstration  Provided to: Patient  Level of Understanding: Verbalized, Demonstrated              Time Calculation:   Start Time: 1440  Stop Time: 1520  Time Calculation (min): 40 min  Total Timed Code Minutes- PT: 40 minute(s)    Therapy Charges for Today     Code Description Service Date Service Provider Modifiers Qty    50629064338  PT MANUAL THERAPY EA 15 MIN 3/21/2018 Dedrick Fatima, PT GP 2    19103766297  PT THER PROC EA 15 MIN 3/21/2018 Dedrick Fatima, PT GP 1                    Dedrick Fatima, PT  3/21/2018

## 2018-03-23 ENCOUNTER — HOSPITAL ENCOUNTER (OUTPATIENT)
Dept: PHYSICAL THERAPY | Facility: HOSPITAL | Age: 43
Setting detail: THERAPIES SERIES
Discharge: HOME | End: 2018-03-23

## 2018-03-23 DIAGNOSIS — M54.2 NECK PAIN: Primary | ICD-10-CM

## 2018-03-23 PROCEDURE — 97140 MANUAL THERAPY 1/> REGIONS: CPT

## 2018-03-23 PROCEDURE — 97110 THERAPEUTIC EXERCISES: CPT

## 2018-03-23 NOTE — THERAPY TREATMENT NOTE
Outpatient Physical Therapy Ortho Treatment Note  [facility]     Patient Name: Maribel Whitaker  : 1975  MRN: [id number]  Today's Date: 3/23/2018      Visit Date: 2018    Visit Dx:    ICD-10-CM ICD-9-CM   1. Neck pain M54.2 723.1       Patient Active Problem List   Diagnosis   • Acute suppurative otitis media of right ear without spontaneous rupture of tympanic membrane   • Candida, oral        Past Medical History:   Diagnosis Date   • Acid reflux    • ADHD (attention deficit hyperactivity disorder)    • Allergic    • Anxiety    • Arthritis    • Depression    • Fibromyalgia    • Hypertension    • Migraines         Past Surgical History:   Procedure Laterality Date   •  SECTION     • CHOLECYSTECTOMY                               PT Assessment/Plan     Row Name 18 1300          PT Assessment    Assessment Comments Pt reports that she feels 50% improved since begining therapy.  She continues to demonstrate forward flexed posture and rounded shoulders. With cues pt is able to correct however wuickly reverts bacck to habitutal posture. Continued today with focus on improving cervical and thoracic mobility as well scapular stability and postural education. This was pt'slast scheduled visit; however she would benefit from 2 more weeks of therapy at 2 times a week to continue to improve mobility and bolster HEP. Pt's ROM with B cervical rotatio and extension has improved since inital eval; however, still limited.   -TR        PT Plan    PT Plan Comments Await approval for extension adn schedule more visits. Continue with manual techniques to improve mobiltiy and focus on postural education and strengthening.   -TR       User Key  (r) = Recorded By, (t) = Taken By, (c) = Cosigned By    Initials Name Provider Type    FLY Encinas PTA Physical Therapy Assistant                    Exercises     Row Name 18 1851             Subjective Comments    Subjective Comments Pt reports  that she was allergic to the tape and took it off an hour after leaving therapy.   -TR         Subjective Pain    Able to rate subjective pain? yes  -TR      Pre-Treatment Pain Level 3  -TR      Post-Treatment Pain Level 5  -TR      Subjective Pain Comment Pt increased after manual therapy   -TR         Exercise 1    Exercise Name 1 Hooklying towel stretch   -TR      Cueing 1 Verbal  -TR      Time 1 5  -TR         Exercise 2    Exercise Name 2 B serratus punches in hooklying   -TR      Cueing 2 Verbal  -TR      Sets 2 2  -TR      Reps 2 10  -TR         Exercise 3    Exercise Name 3 Hooklying on towel roll BUE diagonals with yellow T-band   -TR      Cueing 3 Verbal  -TR      Sets 3 1  -TR      Reps 3 20  -TR        User Key  (r) = Recorded By, (t) = Taken By, (c) = Cosigned By    Initials Name Provider Type    TR Slime Encinas PTA Physical Therapy Assistant                        Manual Rx (last 36 hours)      Manual Treatments     Row Name 03/23/18 1302             Manual Rx 2    Manual Rx 2 Location CT and Upper thoracic   -TR      Manual Rx 2 Type extension mobilizations in prone   -TR      Manual Rx 2 Grade 2 sustained  -TR      Manual Rx 2 Duration 15  -TR         Manual Rx 3    Manual Rx 3 Location suboccipital release and cervical traction  -TR      Manual Rx 3 Type extension mobilizations in prone   -TR      Manual Rx 3 Grade 1-2 and min STM  -TR      Manual Rx 3 Duration 15  -TR         Manual Rx 7    Manual Rx 7 Duration 3  -TR        User Key  (r) = Recorded By, (t) = Taken By, (c) = Cosigned By    Initials Name Provider Type    TR Slime Encinas PTA Physical Therapy Assistant                PT OP Goals     Row Name 03/23/18 1302          Long Term Goals    LTG Date to Achieve 03/16/18  -TR     LTG 1 Full and symmetrical active cervical rotation  -TR     LTG 1 Progress Ongoing  -TR     LTG 1 Progress Comments L 40 degrees and R 45 degrees   -TR     LTG 2 Cervical active extension to 75%  -TR      LTG 2 Progress Ongoing  -TR     LTG 2 Progress Comments 25 degrees today   -TR     LTG 3 No neck pain except occasional minor twinges no more than 1-2/10  -TR     LTG 3 Progress Ongoing  -TR     LTG 3 Progress Comments Pt reports that her pain is constant and at a 5-6/10   -TR     LTG 4 Ind with HEP for posture and flexibility  -TR     LTG 4 Progress Ongoing  -TR     LTG 4 Progress Comments Progressing   -TR        Time Calculation    PT Goal Re-Cert Due Date 04/11/18  -TR       User Key  (r) = Recorded By, (t) = Taken By, (c) = Cosigned By    Initials Name Provider Type    TR Slime Encinas PTA Physical Therapy Assistant          Therapy Education  Education Details: POC, Reviewed current HEP, and posture   Given: HEP, Posture/body mechanics  Program: Reinforced  How Provided: Verbal  Provided to: Patient  Level of Understanding: Verbalized              Time Calculation:   Start Time: 1302  Stop Time: 1345  Time Calculation (min): 43 min  Total Timed Code Minutes- PT: 43 minute(s)    Therapy Charges for Today     Code Description Service Date Service Provider Modifiers Qty    84236539101 HC PT MANUAL THERAPY EA 15 MIN 3/23/2018 Slime Encinas PTA GP 2    55718234033 HC PT THER PROC EA 15 MIN 3/23/2018 Slime Encinas PTA GP 1                    Slime Encinas PTA  3/23/2018

## 2018-04-03 ENCOUNTER — HOSPITAL ENCOUNTER (OUTPATIENT)
Dept: PHYSICAL THERAPY | Facility: HOSPITAL | Age: 43
Setting detail: THERAPIES SERIES
Discharge: HOME | End: 2018-04-03

## 2018-04-03 DIAGNOSIS — M54.2 NECK PAIN: Primary | ICD-10-CM

## 2018-04-03 PROCEDURE — 97110 THERAPEUTIC EXERCISES: CPT | Performed by: PHYSICAL THERAPIST

## 2018-04-06 ENCOUNTER — HOSPITAL ENCOUNTER (OUTPATIENT)
Dept: PHYSICAL THERAPY | Facility: HOSPITAL | Age: 43
Setting detail: THERAPIES SERIES
Discharge: HOME | End: 2018-04-06

## 2018-04-06 DIAGNOSIS — M54.2 NECK PAIN: Primary | ICD-10-CM

## 2018-04-06 PROCEDURE — 97110 THERAPEUTIC EXERCISES: CPT

## 2018-04-06 PROCEDURE — 97140 MANUAL THERAPY 1/> REGIONS: CPT

## 2018-04-06 NOTE — THERAPY TREATMENT NOTE
Outpatient Physical Therapy Ortho Treatment Note  [facility]     Patient Name: Maribel Whitaker  : 1975  MRN: [id number]  Today's Date: 2018      Visit Date: 2018    Visit Dx:    ICD-10-CM ICD-9-CM   1. Neck pain M54.2 723.1       Patient Active Problem List   Diagnosis   • Acute suppurative otitis media of right ear without spontaneous rupture of tympanic membrane   • Candida, oral        Past Medical History:   Diagnosis Date   • Acid reflux    • ADHD (attention deficit hyperactivity disorder)    • Allergic    • Anxiety    • Arthritis    • Depression    • Fibromyalgia    • Hypertension    • Migraines         Past Surgical History:   Procedure Laterality Date   •  SECTION     • CHOLECYSTECTOMY                               PT Assessment/Plan     Row Name 18 6448          PT Assessment    Assessment Comments Patient did have radicular symptoms present today with tingling/numbness down her arms.  She tends to  slight left trunk rotation with right trunk lean.  Time was spent today on patient's ability to maintain midline, however she continues to revert back to the rotation and lean.  She continues to tend to compensate with over activation of upper trapezius over the middle and lower trapezius muscles.  -ZHANE        PT Plan    PT Plan Comments We will continue to progress with postural education and endurance.  We will also work on achieving midline orientation.  -ZHANE       User Key  (r) = Recorded By, (t) = Taken By, (c) = Cosigned By    Initials Name Provider Type    ZHANE Rhoades PTA Physical Therapy Assistant                    Exercises     Row Name 18 7460             Subjective Comments    Subjective Comments Patient reports she is having burning/tingling down both arms.  She reports she feels her neck is more flexible.  She reports her radicular symptoms are present most days.  She reports last night while she was moving her arms she felt as if her arms  "were getting stuck, which lasted for about 15 seconds.  -ZHANE         Subjective Pain    Able to rate subjective pain? yes  -ZHANE      Pre-Treatment Pain Level 3  -ZHANE      Post-Treatment Pain Level 3  -ZHANE         Exercise 1    Exercise Name 1 prone I's  -ZHANE      Cueing 1 Verbal;Tactile  -ZHANE      Sets 1 3  -ZHANE      Reps 1 10  -ZHANE         Exercise 2    Exercise Name 2 prone mid-lower trap activation (hands on head)  -ZHANE      Cueing 2 Verbal;Tactile  -ZHANE      Sets 2 1  -ZHANE      Reps 2 10  -ZHANE      Additional Comments did not perform more than one set due to high compensation rate of upper trapezius and lower back  -ZHANE         Exercise 3    Exercise Name 3 B serratus punches in hooklying   -ZHANE      Cueing 3 Verbal;Tactile  -ZHANE      Sets 3 2  -ZHANE      Reps 3 10  -ZHANE         Exercise 4    Exercise Name 4 1/2 foam thoracic extension stretch  -ZHANE      Cueing 4 Verbal;Tactile  -ZHANE      Time 4 2 minutes  -ZHANE         Exercise 5    Exercise Name 5 on 1/2 foam horz abduction with yellow TB  -ZHANE      Cueing 5 Verbal  -ZHANE      Sets 5 2  -ZHANE      Reps 5 10  -ZHANE         Exercise 6    Exercise Name 6 supine diagonals with yellow TB  -ZHANE      Cueing 6 Verbal;Tactile  -ZHANE      Sets 6 2  -ZHANE      Reps 6 10  -ZHANE         Exercise 7    Exercise Name 7 standing at wall posture correction  -ZHANE      Cueing 7 Verbal;Tactile;Demo  -ZHANE      Additional Comments She tends to stand left runk rotation and right trunk lean  -ZHANE         Exercise 8    Exercise Name 8 standing against pink foam \"W's\"  -ZHANE      Cueing 8 Verbal;Tactile;Demo  -ZHANE      Sets 8 2  -ZHANE      Reps 8 10  -ZHANE        User Key  (r) = Recorded By, (t) = Taken By, (c) = Cosigned By    Initials Name Provider Type    ZHANE Rhoades, PTA Physical Therapy Assistant                        Manual Rx (last 36 hours)      Manual Treatments     Row Name 04/06/18 2451             Manual Rx 2    Manual Rx 2 Location CT and Upper thoracic   -ZHANE      Manual Rx 2 Type extension mobilizations in " prone   -ZHANE      Manual Rx 2 Grade 2/3    no cavitations  -ZHANE      Manual Rx 2 Duration 10  -ZHANE        User Key  (r) = Recorded By, (t) = Taken By, (c) = Cosigned By    Initials Name Provider Type    ZHANE Rhoades PTA Physical Therapy Assistant                PT OP Goals     Row Name 04/06/18 1432          Long Term Goals    LTG Date to Achieve 03/16/18  -ZHANE     LTG 1 Full and symmetrical active cervical rotation  -ZHANE     LTG 1 Progress Ongoing  -ZHANE     LTG 2 Cervical active extension to 75%  -ZHANE     LTG 2 Progress Ongoing  -ZHANE     LTG 3 No neck pain except occasional minor twinges no more than 1-2/10  -ZHANE     LTG 3 Progress Ongoing  -ZHANE     LTG 3 Progress Comments 3/10 today   -ZHANE     LTG 4 Ind with HEP for posture and flexibility  -ZHANE     LTG 4 Progress Ongoing  -ZHANE        Time Calculation    PT Goal Re-Cert Due Date 05/11/18  -ZHANE       User Key  (r) = Recorded By, (t) = Taken By, (c) = Cosigned By    Initials Name Provider Type    ZHANE Rhoades PTA Physical Therapy Assistant          Therapy Education  Given: HEP  Program: Reinforced  How Provided: Verbal  Provided to: Patient  Level of Understanding: Verbalized              Time Calculation:   Start Time: 1432  Stop Time: 1515  Time Calculation (min): 43 min  Total Timed Code Minutes- PT: 43 minute(s)    Therapy Charges for Today     Code Description Service Date Service Provider Modifiers Qty    46779072611 HC PT MANUAL THERAPY EA 15 MIN 4/6/2018 Peter Rhoades PTA GP 1    87167354529 HC PT THER PROC EA 15 MIN 4/6/2018 Peter Rhoades PTA GP 2                    Peter Rhoades PTA  4/6/2018

## 2018-04-09 ENCOUNTER — HOSPITAL ENCOUNTER (OUTPATIENT)
Dept: PHYSICAL THERAPY | Facility: HOSPITAL | Age: 43
Setting detail: THERAPIES SERIES
Discharge: HOME | End: 2018-04-09

## 2018-04-09 DIAGNOSIS — M54.2 NECK PAIN: Primary | ICD-10-CM

## 2018-04-09 PROCEDURE — 97110 THERAPEUTIC EXERCISES: CPT

## 2018-04-09 PROCEDURE — 97140 MANUAL THERAPY 1/> REGIONS: CPT

## 2018-04-09 NOTE — THERAPY TREATMENT NOTE
Outpatient Physical Therapy Ortho Treatment Note  [facility]     Patient Name: Maribel Whitaker  : 1975  MRN: [id number]  Today's Date: 2018      Visit Date: 2018    Visit Dx:    ICD-10-CM ICD-9-CM   1. Neck pain M54.2 723.1       Patient Active Problem List   Diagnosis   • Acute suppurative otitis media of right ear without spontaneous rupture of tympanic membrane   • Candida, oral        Past Medical History:   Diagnosis Date   • Acid reflux    • ADHD (attention deficit hyperactivity disorder)    • Allergic    • Anxiety    • Arthritis    • Depression    • Fibromyalgia    • Hypertension    • Migraines         Past Surgical History:   Procedure Laterality Date   •  SECTION     • CHOLECYSTECTOMY                               PT Assessment/Plan     Row Name 18 1445          PT Assessment    Assessment Comments Patient's posture continues to be a contributing factor into her symptoms.  She continues to rest in forward rounded posture.  She also demonstrated scapular dyskinesia today with decreased recruitment of the right compared to the left.  She was progressed with more scapular strengthening activities today and for HEP.  -ZHANE        PT Plan    PT Plan Comments We will need to continue to emphasize the importance of compliance for postural education and continue with postural strengthening.  -ZHANE       User Key  (r) = Recorded By, (t) = Taken By, (c) = Cosigned By    Initials Name Provider Type    ZHANE Rhoades, HELLEN Physical Therapy Assistant                    Exercises     Row Name 18 7463             Subjective Comments    Subjective Comments She reports she has a pain that goes from her (R) neck into and all the way around her shoulder.  She reports she woke up with it this morning.  She denies symptoms down the arms currently.  -ZHANE         Subjective Pain    Able to rate subjective pain? yes  -ZHANE      Pre-Treatment Pain Level 5  -ZHANE      Post-Treatment Pain Level 6   -ZHANE         Exercise 1    Exercise Name 1 prone I's  -ZHANE      Cueing 1 Verbal;Tactile  -ZHANE      Sets 1 3  -ZHANE      Reps 1 10  -ZHANE      Time 1 3 second holds  -ZHANE         Exercise 2    Exercise Name 2 1/2 foam thoracic extension stretch  -ZHANE      Cueing 2 Verbal;Tactile  -ZHANE      Time 2 3 minutes  -ZHANE         Exercise 3    Exercise Name 3 1/2 foam marching  -ZHANE      Cueing 3 Verbal;Tactile  -ZHANE      Sets 3 3  -ZHANE      Reps 3 10  -ZHANE         Exercise 4    Exercise Name 4 standing against pink foam: standinng W's  -ZHANE      Cueing 4 Verbal;Tactile  -ZHANE      Sets 4 3  -ZHANE      Reps 4 10  -ZHANE      Additional Comments lime green band anchored under right foot  -ZHANE         Exercise 5    Exercise Name 5 standing scapular retraction  -ZHANE      Cueing 5 Verbal  -ZHANE      Sets 5 3  -ZHANE      Reps 5 10  -ZHANE      Time 5 10 second holds  -ZHANE      Additional Comments lime green/red band added to HEP  -ZHANE         Exercise 6    Exercise Name 6 standing shoulder horizontal abduction  -ZHANE      Cueing 6 Verbal;Tactile  -ZHANE      Sets 6 2  -ZHANE      Reps 6 10  -ZHANE      Additional Comments red band, added to HEP  -ZHANE        User Key  (r) = Recorded By, (t) = Taken By, (c) = Cosigned By    Initials Name Provider Type    ZHANE Rhoades PTA Physical Therapy Assistant                        Manual Rx (last 36 hours)      Manual Treatments     Row Name 04/09/18 1442             Manual Rx 1    Manual Rx 1 Location prone (R) UT/LS  -ZHANE      Manual Rx 1 Type STM  -ZHANE      Manual Rx 1 Grade min-mod  -ZHANE      Manual Rx 1 Duration 9  -ZHANE        User Key  (r) = Recorded By, (t) = Taken By, (c) = Cosigned By    Initials Name Provider Type    ZHANE Rhoades PTA Physical Therapy Assistant                PT OP Goals     Row Name 04/09/18 1440          Long Term Goals    LTG Date to Achieve 03/16/18  -ZHANE     LTG 1 Full and symmetrical active cervical rotation  -ZHANE     LTG 1 Progress Ongoing  -ZHANE     LTG 2 Cervical active extension to 75%  -ZHANE      LTG 2 Progress Ongoing  -ZHANE     LTG 3 No neck pain except occasional minor twinges no more than 1-2/10  -ZHANE     LTG 3 Progress Ongoing  -ZHANE     LTG 3 Progress Comments 5/10 before treatment and 6/10 after treatment  -ZHANE     LTG 4 Ind with HEP for posture and flexibility  -ZHANE     LTG 4 Progress Ongoing  -ZHANE     LTG 4 Progress Comments added postural components today  -ZHANE        Time Calculation    PT Goal Re-Cert Due Date 05/11/18  -ZHANE       User Key  (r) = Recorded By, (t) = Taken By, (c) = Cosigned By    Initials Name Provider Type    ZHANE Rhoades PTA Physical Therapy Assistant          Therapy Education  Education Details: standing scapular retractions and horizontal abduction with red band  Given: HEP  Program: New  How Provided: Verbal, Written, Demonstration  Provided to: Patient  Level of Understanding: Verbalized, Demonstrated              Time Calculation:   Start Time: 1440  Stop Time: 1525  Time Calculation (min): 45 min  Total Timed Code Minutes- PT: 45 minute(s)    Therapy Charges for Today     Code Description Service Date Service Provider Modifiers Qty    08289562443 HC PT MANUAL THERAPY EA 15 MIN 4/9/2018 Peter Rhoades PTA GP 1    91682030011 HC PT THER PROC EA 15 MIN 4/9/2018 Peter Rhoades PTA GP 2                    Peter Rhoades PTA  4/9/2018

## 2018-04-11 ENCOUNTER — HOSPITAL ENCOUNTER (OUTPATIENT)
Dept: PHYSICAL THERAPY | Facility: HOSPITAL | Age: 43
Setting detail: THERAPIES SERIES
Discharge: HOME | End: 2018-04-11

## 2018-04-11 DIAGNOSIS — M54.2 NECK PAIN: Primary | ICD-10-CM

## 2018-04-11 PROCEDURE — 97140 MANUAL THERAPY 1/> REGIONS: CPT

## 2018-04-11 PROCEDURE — 97110 THERAPEUTIC EXERCISES: CPT

## 2018-04-11 NOTE — THERAPY TREATMENT NOTE
Outpatient Physical Therapy Ortho Treatment Note  [facility]     Patient Name: Maribel Whitaker  : 1975  MRN: [id number]  Today's Date: 2018      Visit Date: 2018    Visit Dx:    ICD-10-CM ICD-9-CM   1. Neck pain M54.2 723.1       Patient Active Problem List   Diagnosis   • Acute suppurative otitis media of right ear without spontaneous rupture of tympanic membrane   • Candida, oral        Past Medical History:   Diagnosis Date   • Acid reflux    • ADHD (attention deficit hyperactivity disorder)    • Allergic    • Anxiety    • Arthritis    • Depression    • Fibromyalgia    • Hypertension    • Migraines         Past Surgical History:   Procedure Laterality Date   •  SECTION     • CHOLECYSTECTOMY                               PT Assessment/Plan     Row Name 18 1317          PT Assessment    Assessment Comments Her DTR's were all WNL; however, her R patellar reflex was a bit hyporeflexive. Scratch collapse testing was positive at B pec and scalenes region but R more so than L. Her cervical extension was much improved today as compared to initially 18 degrees vs. today's measurement of 34 degrees AROM. She has made progress towards all goals but yet to achieve any at this time.  -EC        PT Plan    PT Plan Comments Continue to focus on postural control and awareness, consider assessing B shoulder PROM to r/o possible refferal from her R shoulder.  -EC       User Key  (r) = Recorded By, (t) = Taken By, (c) = Cosigned By    Initials Name Provider Type    EC Juan Carlos Lewis PTA Physical Therapy Assistant                    Exercises     Row Name 18 0900             Subjective Comments    Subjective Comments She reports neck pain, last few days pain into the R shoulder blade.reports intermittent tingling into her R hand  -EC         Subjective Pain    Able to rate subjective pain? yes  -EC      Pre-Treatment Pain Level 3  -EC      Post-Treatment Pain Level 3  -EC          Exercise 1    Exercise Name 1 obtained measurements and objective and subjective for updated insurance authorization (see hard chart)  -EC         Exercise 2    Exercise Name 2 Scratch Collapse B UE (see assessment section)  -EC         Exercise 3    Exercise Name 3 DTR's all extremities (see assessment section)  -EC         Exercise 4    Exercise Name 4 B UE unweighted, towel roll along thoracic region B cervical rotation, flexion/extension  -EC      Reps 4 20  -EC        User Key  (r) = Recorded By, (t) = Taken By, (c) = Cosigned By    Initials Name Provider Type    EC Juan Carlos Lewis PTA Physical Therapy Assistant                        Manual Rx (last 36 hours)      Manual Treatments     Row Name 04/11/18 0900             Manual Rx 1    Manual Rx 1 Location thoracic  -EC      Manual Rx 1 Type prone extension mobilizations  -EC      Manual Rx 1 Grade 2  -EC      Manual Rx 1 Duration 10  -EC         Manual Rx 2    Manual Rx 2 Location R upper trap, levator scapula,   -EC      Manual Rx 2 Type STM in sitting with B UE unweighted  -EC      Manual Rx 2 Duration 8  -EC        User Key  (r) = Recorded By, (t) = Taken By, (c) = Cosigned By    Initials Name Provider Type    EC Juan Carlos Lewis PTA Physical Therapy Assistant                PT OP Goals     Row Name 04/11/18 0933          Long Term Goals    LTG Date to Achieve 05/11/18  -EC     LTG 1 Full and symmetrical active cervical rotation  -EC     LTG 1 Progress Ongoing  -EC     LTG 1 Progress Comments see LTG#2  -EC     LTG 2 Cervical active extension to 75%  -EC     LTG 2 Progress Ongoing  -EC     LTG 2 Progress Comments flexion 43 degrees, extension 34 degrees, L rotation 54 degrees and R rotation 40 degrees AROM  -EC     LTG 3 No neck pain except occasional minor twinges no more than 1-2/10  -EC     LTG 3 Progress Ongoing  -EC     LTG 3 Progress Comments 3/10  -EC     LTG 4 Ind with HEP for posture and flexibility  -EC     LTG 4 Progress Ongoing  -EC     LTG 4  "Progress Comments verbalized components, demonstrated \"X\" pattern and UE phasic  -EC        Time Calculation    PT Goal Re-Cert Due Date 05/11/18  -EC       User Key  (r) = Recorded By, (t) = Taken By, (c) = Cosigned By    Initials Name Provider Type    EC Juan Carlos Lewis PTA Physical Therapy Assistant          Therapy Education  Given: HEP, Symptoms/condition management, Posture/body mechanics  How Provided: Verbal  Provided to: Patient  Level of Understanding: Teach back education performed              Time Calculation:   Start Time: 0933  Stop Time: 1015  Time Calculation (min): 42 min  Total Timed Code Minutes- PT: 42 minute(s)    Therapy Charges for Today     Code Description Service Date Service Provider Modifiers Qty    09373501521 HC PT MANUAL THERAPY EA 15 MIN 4/11/2018 Juan Carlos Lewis PTA GP 1    32782698240 HC PT THER PROC EA 15 MIN 4/11/2018 Juan Carlos Lewis PTA GP 2                    Juan Carlos Lewis PTA  4/11/2018     "

## 2018-04-11 NOTE — THERAPY PROGRESS REPORT/RE-CERT
Outpatient Physical Therapy Ortho Progress Note  [facility]     Patient Name: Maribel Whitaker  : 1975  MRN: [id number]  Today's Date: 2018      Visit Date: 2018    Visit Dx:    ICD-10-CM ICD-9-CM   1. Neck pain M54.2 723.1       Patient Active Problem List   Diagnosis   • Acute suppurative otitis media of right ear without spontaneous rupture of tympanic membrane   • Candida, oral        Past Medical History:   Diagnosis Date   • Acid reflux    • ADHD (attention deficit hyperactivity disorder)    • Allergic    • Anxiety    • Arthritis    • Depression    • Fibromyalgia    • Hypertension    • Migraines         Past Surgical History:   Procedure Laterality Date   •  SECTION     • CHOLECYSTECTOMY                               PT Assessment/Plan     Row Name 18 1317          PT Assessment    Functional Limitations Limitation in home management;Performance in leisure activities  -TB     Impairments Pain;Joint mobility;Impaired flexibility;Posture;Range of motion  -TB     Assessment Comments Her DTR's were all WNL; however, her R patellar reflex was a bit hyporeflexive. Scratch collapse testing was positive at B pec and scalenes region but R more so than L. Her cervical extension was much improved today as compared to initially 18 degrees vs. today's measurement of 34 degrees AROM. She has made progress towards all goals but yet to achieve any at this time.  -EC She struggles with holding her posture erect. Even with cues, she will sit upright for a couple of seconds and immediately slouch. I told her that her posture could make or break her outcome. TB    Rehab Potential Good  -TB     Patient/caregiver participated in establishment of treatment plan and goals Yes  -TB     Patient would benefit from skilled therapy intervention Yes  -TB        PT Plan    PT Frequency 2x/week;3x/week  -TB     Predicted Duration of Therapy Intervention (OT Eval) 4 weeks  -TB     Planned CPT's? PT THER  PROC EA 15 MIN: 77915;PT MANUAL THERAPY EA 15 MIN: 07667;PT ELECTRICAL STIM UNATTEND: ;PT ELECTRICAL STIM ATTD EA 15 MIN: 66058;PT ULTRASOUND EA 15 MIN: 03663;PT TRACTION CERVICAL: 20049  -TB     PT Plan Comments Continue to focus on postural control and awareness, consider assessing B shoulder PROM to r/o possible refferal from her R shoulder.  -EC       User Key  (r) = Recorded By, (t) = Taken By, (c) = Cosigned By    Initials Name Provider Type    TB Dedrick Fatima, PT Physical Therapist    EC Juan Carlos Lewis, HELLEN Physical Therapy Assistant                    Exercises     Row Name 04/11/18 0900             Subjective Comments    Subjective Comments She reports neck pain, last few days pain into the R shoulder blade.reports intermittent tingling into her R hand  -EC         Subjective Pain    Able to rate subjective pain? yes  -EC      Pre-Treatment Pain Level 3  -EC      Post-Treatment Pain Level 3  -EC         Exercise 1    Exercise Name 1 obtained measurements and objective and subjective for updated insurance authorization (see hard chart)  -EC         Exercise 2    Exercise Name 2 Scratch Collapse B UE (see assessment section)  -EC         Exercise 3    Exercise Name 3 DTR's all extremities (see assessment section)  -EC         Exercise 4    Exercise Name 4 B UE unweighted, towel roll along thoracic region B cervical rotation, flexion/extension  -EC      Reps 4 20  -EC        User Key  (r) = Recorded By, (t) = Taken By, (c) = Cosigned By    Initials Name Provider Type    EC Juan Carlos Lewis, HELLEN Physical Therapy Assistant                        Manual Rx (last 36 hours)      Manual Treatments     Row Name 04/11/18 0900             Manual Rx 1    Manual Rx 1 Location thoracic  -EC      Manual Rx 1 Type prone extension mobilizations  -EC      Manual Rx 1 Grade 2  -EC      Manual Rx 1 Duration 10  -EC         Manual Rx 2    Manual Rx 2 Location R upper trap, levator scapula,   -EC      Manual Rx 2 Type  "STM in sitting with B UE unweighted  -EC      Manual Rx 2 Duration 8  -EC        User Key  (r) = Recorded By, (t) = Taken By, (c) = Cosigned By    Initials Name Provider Type    EC Juan Carlos Lewis PTA Physical Therapy Assistant                PT OP Goals     Row Name 04/11/18 0933          Long Term Goals    LTG Date to Achieve 05/11/18  -EC     LTG 1 Full and symmetrical active cervical rotation  -EC     LTG 1 Progress Ongoing  -EC     LTG 1 Progress Comments see LTG#2  -EC     LTG 2 Cervical active extension to 75%  -EC     LTG 2 Progress Ongoing  -EC     LTG 2 Progress Comments flexion 43 degrees, extension 34 degrees, L rotation 54 degrees and R rotation 40 degrees AROM  -EC     LTG 3 No neck pain except occasional minor twinges no more than 1-2/10  -EC     LTG 3 Progress Ongoing  -EC     LTG 3 Progress Comments 3/10  -EC     LTG 4 Ind with HEP for posture and flexibility  -EC     LTG 4 Progress Ongoing  -EC     LTG 4 Progress Comments verbalized components, demonstrated \"X\" pattern and UE phasic  -EC        Time Calculation    PT Goal Re-Cert Due Date 05/11/18  -EC       User Key  (r) = Recorded By, (t) = Taken By, (c) = Cosigned By    Initials Name Provider Type    TRISH Lewis PTA Physical Therapy Assistant          Therapy Education  Given: HEP, Symptoms/condition management, Posture/body mechanics  How Provided: Verbal  Provided to: Patient  Level of Understanding: Teach back education performed          The plan of care and all goals were reviewed and updated as needed by Dedrick Fatima, PT 4/11/2018 2:14 PM      Time Calculation:   Start Time: 0933  Stop Time: 1015  Time Calculation (min): 42 min  Total Timed Code Minutes- PT: 42 minute(s)                  Dedrick Fatima, PT  4/11/2018     "

## 2018-04-16 ENCOUNTER — HOSPITAL ENCOUNTER (OUTPATIENT)
Dept: PHYSICAL THERAPY | Facility: HOSPITAL | Age: 43
Setting detail: THERAPIES SERIES
Discharge: HOME | End: 2018-04-16

## 2018-04-16 DIAGNOSIS — M54.2 NECK PAIN: Primary | ICD-10-CM

## 2018-04-16 PROCEDURE — 97140 MANUAL THERAPY 1/> REGIONS: CPT

## 2018-04-16 PROCEDURE — 97110 THERAPEUTIC EXERCISES: CPT

## 2018-04-16 NOTE — THERAPY TREATMENT NOTE
Outpatient Physical Therapy Ortho Treatment Note  [facility]     Patient Name: Maribel Whitaker  : 1975  MRN: [id number]  Today's Date: 2018      Visit Date: 2018    Visit Dx:    ICD-10-CM ICD-9-CM   1. Neck pain M54.2 723.1       Patient Active Problem List   Diagnosis   • Acute suppurative otitis media of right ear without spontaneous rupture of tympanic membrane   • Candida, oral        Past Medical History:   Diagnosis Date   • Acid reflux    • ADHD (attention deficit hyperactivity disorder)    • Allergic    • Anxiety    • Arthritis    • Depression    • Fibromyalgia    • Hypertension    • Migraines         Past Surgical History:   Procedure Laterality Date   •  SECTION     • CHOLECYSTECTOMY                               PT Assessment/Plan     Row Name 18 1306          PT Assessment    Assessment Comments Pt reports no change in symptoms since last visit. Treatment continued to focus on postural control and awareness. Pt continues to sit with poor posture and reports that she has a hard time remembering this at home. Pt had increased difficulty with mid-low trap activation. Pt's R shoulder ROM was slightly limited with abduction and ER. Pt also demonstrates poor coor control with foam roller activties today.   -TR        PT Plan    PT Plan Comments We will continue to focus on poatural control and awareness.   -TR       User Key  (r) = Recorded By, (t) = Taken By, (c) = Cosigned By    Initials Name Provider Type    TR Slime Encinas PTA Physical Therapy Assistant                    Exercises     Row Name 18 3428             Subjective Comments    Subjective Comments Pt reports decreased symptoms into the R hand and decreased pain in the R shoulder blade   -TR         Subjective Pain    Able to rate subjective pain? yes  -TR      Pre-Treatment Pain Level 3  -TR      Post-Treatment Pain Level 5  -TR         Exercise 1    Exercise Name 1 Prone mid-low acctivation (  hands on head)  -TR      Cueing 1 Verbal  -TR      Sets 1 2  -TR      Reps 1 10  -TR      Additional Comments Slight increase in pain   -TR         Exercise 2    Exercise Name 2 1/2 foam thoracic extension stretch  -TR      Cueing 2 Verbal;Tactile  -TR      Time 2 3 minutes  -TR         Exercise 3    Exercise Name 3 1/2 foam marching  -TR      Cueing 3 Verbal;Tactile  -TR      Sets 3 3  -TR      Reps 3 10  -TR         Exercise 4    Exercise Name 4 1/2 foam roller unilateral horizontal abduction BUE  -TR      Cueing 4 Verbal  -TR      Sets 4 2  -TR      Reps 4 10  -TR      Additional Comments Red T-band   -TR         Exercise 5    Exercise Name 5 Assesed R shoulder ROM   -TR         Exercise 6    Exercise Name 6 Standing with 1/2 foam roller at back B horizontal shoulder abduction   -TR      Cueing 6 Verbal  -TR      Sets 6 2  -TR      Reps 6 10  -TR      Additional Comments Red band   -TR         Exercise 7    Exercise Name 7 Standing at half foam roller W's  -TR      Cueing 7 Verbal  -TR      Sets 7 2  -TR      Reps 7 15  -TR         Exercise 8    Exercise Name 8 Seated on 65cm SB scapular retraction with R band   -TR      Cueing 8 Verbal;Tactile  -TR      Sets 8 2  -TR      Reps 8 15  -TR        User Key  (r) = Recorded By, (t) = Taken By, (c) = Cosigned By    Initials Name Provider Type    FLY Encinas PTA Physical Therapy Assistant                        Manual Rx (last 36 hours)      Manual Treatments     Row Name 04/16/18 1302             Manual Rx 1    Manual Rx 1 Location Thoracic   -TR      Manual Rx 1 Type Prone extension mobilizations   -TR      Manual Rx 1 Grade 2/3    no cavitations   -TR      Manual Rx 1 Duration 10  -TR        User Key  (r) = Recorded By, (t) = Taken By, (c) = Cosigned By    Initials Name Provider Type    FLY Encinas PTA Physical Therapy Assistant                PT OP Goals     Row Name 04/16/18 1302          Long Term Goals    LTG Date to Achieve 05/11/18  -TR      LTG 1 Full and symmetrical active cervical rotation  -TR     LTG 1 Progress Ongoing  -TR     LTG 1 Progress Comments continues to be more limited to the L   -TR     LTG 2 Cervical active extension to 75%  -TR     LTG 2 Progress Ongoing  -TR     LTG 3 No neck pain except occasional minor twinges no more than 1-2/10  -TR     LTG 3 Progress Ongoing  -TR     LTG 3 Progress Comments 3/10 today   -TR     LTG 4 Ind with HEP for posture and flexibility  -TR     LTG 4 Progress Ongoing  -TR        Time Calculation    PT Goal Re-Cert Due Date 05/11/18  -TR       User Key  (r) = Recorded By, (t) = Taken By, (c) = Cosigned By    Initials Name Provider Type    TR Slime Encinas PTA Physical Therapy Assistant          Therapy Education  Given: HEP, Symptoms/condition management, Posture/body mechanics  Program: Reinforced  How Provided: Verbal  Provided to: Patient  Level of Understanding: Teach back education performed              Time Calculation:   Start Time: 1302  Stop Time: 1345  Time Calculation (min): 43 min  Total Timed Code Minutes- PT: 43 minute(s)    Therapy Charges for Today     Code Description Service Date Service Provider Modifiers Qty    89431460193 HC PT MANUAL THERAPY EA 15 MIN 4/16/2018 Slime Encinas PTA GP 1    61890541785 HC PT THER PROC EA 15 MIN 4/16/2018 Slime Encinas PTA GP 2                    Slime Encinas PTA  4/16/2018

## 2018-04-18 ENCOUNTER — HOSPITAL ENCOUNTER (OUTPATIENT)
Dept: PHYSICAL THERAPY | Facility: HOSPITAL | Age: 43
Setting detail: THERAPIES SERIES
Discharge: HOME | End: 2018-04-18

## 2018-04-18 DIAGNOSIS — M54.2 NECK PAIN: Primary | ICD-10-CM

## 2018-04-18 PROCEDURE — 97140 MANUAL THERAPY 1/> REGIONS: CPT

## 2018-04-18 PROCEDURE — 97110 THERAPEUTIC EXERCISES: CPT

## 2018-04-18 NOTE — THERAPY TREATMENT NOTE
Outpatient Physical Therapy Ortho Treatment Note  [facility]     Patient Name: Mraibel Whitaker  : 1975  MRN: [id number]  Today's Date: 2018      Visit Date: 2018    Visit Dx:    ICD-10-CM ICD-9-CM   1. Neck pain M54.2 723.1       Patient Active Problem List   Diagnosis   • Acute suppurative otitis media of right ear without spontaneous rupture of tympanic membrane   • Candida, oral        Past Medical History:   Diagnosis Date   • Acid reflux    • ADHD (attention deficit hyperactivity disorder)    • Allergic    • Anxiety    • Arthritis    • Depression    • Fibromyalgia    • Hypertension    • Migraines         Past Surgical History:   Procedure Laterality Date   •  SECTION     • CHOLECYSTECTOMY                               PT Assessment/Plan     Row Name 18 7655          PT Assessment    Assessment Comments Her posture was improved and while she remains kyphotic with B forward rounded shoulders her head was not as forward. Today was the first session that standing activities were introduced and we will have to make some modifications in the future.  -EC        PT Plan    PT Plan Comments Consider utilizing KT along thoracic to facilitate more postural awareness and self correction.  -EC       User Key  (r) = Recorded By, (t) = Taken By, (c) = Cosigned By    Initials Name Provider Type    EC Juan Carlos Lewis PTA Physical Therapy Assistant                    Exercises     Row Name 18 1300             Subjective Comments    Subjective Comments She reports burning and pressure at the base of the neck , slight N/T in the R hand   -EC         Subjective Pain    Able to rate subjective pain? yes  -EC      Pre-Treatment Pain Level 5  -EC      Post-Treatment Pain Level 4  -EC         Exercise 1    Exercise Name 1 thoracic stretch on pink foam roller arms @ side x 2min, and @ 40 degrees abduction x 1 min  -EC         Exercise 2    Exercise Name 2 UE phasic in standing against 1/2  "foam roller at the wall  -EC      Reps 2 20  -EC      Additional Comments red T band  -EC         Exercise 3    Exercise Name 3 attempted B unilateral horizontal shoulder abduction with yellow T band in standing against the 1/2 foam roller against the  wall  -EC      Additional Comments too much movement substitution   -EC         Exercise 4    Exercise Name 4 B unilateraal shoulder horizontal abduction standing against the 1/2 foam roller against the wall  -EC      Cueing 4 Verbal;Tactile  -EC      Reps 4 20  -EC         Exercise 5    Exercise Name 5 \"X\" pattern standing against the 1/2 foam roller against the wall  -EC      Reps 5 20  -EC      Additional Comments yellow T band  -EC      Cueing 5 Verbal  -EC         Exercise 6    Exercise Name 6 serratus wall push  -EC      Cueing 6 Verbal;Tactile;Demo  -EC      Reps 6 20  -EC        User Key  (r) = Recorded By, (t) = Taken By, (c) = Cosigned By    Initials Name Provider Type    EC Juan Carlos Lewis PTA Physical Therapy Assistant                        Manual Rx (last 36 hours)      Manual Treatments     Row Name 04/18/18 1300             Manual Rx 2    Manual Rx 2 Location B upper trap, levator scapula, mid thoracic region  -EC      Manual Rx 2 Type STM in sitting with B UE unweighted  -EC      Manual Rx 2 Duration 15  -EC        User Key  (r) = Recorded By, (t) = Taken By, (c) = Cosigned By    Initials Name Provider Type    EC Juan Carlos Lewis PTA Physical Therapy Assistant                PT OP Goals     Row Name 04/18/18 1258          Long Term Goals    LTG Date to Achieve 05/11/18  -EC     LTG 1 Full and symmetrical active cervical rotation  -EC     LTG 1 Progress Ongoing  -EC     LTG 2 Cervical active extension to 75%  -EC     LTG 2 Progress Ongoing  -EC     LTG 3 No neck pain except occasional minor twinges no more than 1-2/10  -EC     LTG 3 Progress Ongoing  -EC     LTG 3 Progress Comments 5/10 prior to session, 4/10 prior  -EC     LTG 4 Ind with HEP for " posture and flexibility  -EC     LTG 4 Progress Ongoing  -EC        Time Calculation    PT Goal Re-Cert Due Date 05/11/18  -EC       User Key  (r) = Recorded By, (t) = Taken By, (c) = Cosigned By    Initials Name Provider Type    EC Juan Carlos Lewis PTA Physical Therapy Assistant          Therapy Education  Given: Posture/body mechanics  How Provided: Verbal, Demonstration  Provided to: Patient  Level of Understanding: Verbalized, Demonstrated              Time Calculation:   Start Time: 1258  Stop Time: 1343  Time Calculation (min): 45 min  Total Timed Code Minutes- PT: 45 minute(s)    Therapy Charges for Today     Code Description Service Date Service Provider Modifiers Qty    08247806935 HC PT THER PROC EA 15 MIN 4/18/2018 Juan Carlos Lewis PTA GP 2    41700993887 HC PT MANUAL THERAPY EA 15 MIN 4/18/2018 Juan Carlos Lewis PTA GP 1                    Juan Carlos Lewis PTA  4/18/2018

## 2018-05-16 ENCOUNTER — DOCUMENTATION (OUTPATIENT)
Dept: PHYSICAL THERAPY | Facility: HOSPITAL | Age: 43
End: 2018-05-16

## 2018-05-16 DIAGNOSIS — M54.2 NECK PAIN: Primary | ICD-10-CM

## 2018-05-21 ENCOUNTER — APPOINTMENT (OUTPATIENT)
Dept: PHYSICAL THERAPY | Facility: HOSPITAL | Age: 43
End: 2018-05-21

## 2018-05-23 ENCOUNTER — APPOINTMENT (OUTPATIENT)
Dept: PHYSICAL THERAPY | Facility: HOSPITAL | Age: 43
End: 2018-05-23

## 2018-11-08 ENCOUNTER — OFFICE VISIT (OUTPATIENT)
Dept: RETAIL CLINIC | Facility: CLINIC | Age: 43
End: 2018-11-08

## 2018-11-08 VITALS
HEART RATE: 78 BPM | OXYGEN SATURATION: 98 % | TEMPERATURE: 97.4 F | SYSTOLIC BLOOD PRESSURE: 110 MMHG | RESPIRATION RATE: 16 BRPM | DIASTOLIC BLOOD PRESSURE: 78 MMHG

## 2018-11-08 DIAGNOSIS — R21 RASH IN ADULT: Primary | ICD-10-CM

## 2018-11-08 DIAGNOSIS — L50.9 HIVES: ICD-10-CM

## 2018-11-08 PROCEDURE — 99213 OFFICE O/P EST LOW 20 MIN: CPT | Performed by: NURSE PRACTITIONER

## 2018-11-08 RX ORDER — LISINOPRIL 20 MG/1
20 TABLET ORAL DAILY
COMMUNITY
End: 2021-05-07 | Stop reason: SDUPTHER

## 2018-11-08 RX ORDER — LAMOTRIGINE 150 MG/1
150 TABLET ORAL 2 TIMES DAILY
COMMUNITY

## 2018-11-08 NOTE — PATIENT INSTRUCTIONS
Benadryl for itching  Follow up if rash spreads     Hives  Hives (urticaria) are itchy, red, swollen areas on your skin. Hives can appear on any part of your body and can vary in size. They can be as small as the tip of a pen or much larger. Hives often fade within 24 hours (acute hives). In other cases, new hives appear after old ones fade. This cycle can continue for several days or weeks (chronic hives).  Hives result from your body's reaction to an irritant or to something that you are allergic to (trigger). When you are exposed to a trigger, your body releases a chemical (histamine) that causes redness, itching, and swelling. You can get hives immediately after being exposed to a trigger or hours later.  Hives do not spread from person to person (are not contagious). Your hives may get worse with scratching, exercise, and emotional stress.  What are the causes?  Causes of this condition include:  · Allergies to certain foods or ingredients.  · Insect bites or stings.  · Exposure to pollen or pet dander.  · Contact with latex or chemicals.  · Spending time in sunlight, heat, or cold (exposure).  · Exercise.  · Stress.    You can also get hives from some medical conditions and treatments. These include:  · Viruses, including the common cold.  · Bacterial infections, such as urinary tract infections and strep throat.  · Disorders such as vasculitis, lupus, or thyroid disease.  · Certain medications.  · Allergy shots.  · Blood transfusions.    Sometimes, the cause of hives is not known (idiopathic hives).  What increases the risk?  This condition is more likely to develop in:  · Women.  · People who have food allergies, especially to citrus fruits, milk, eggs, peanuts, tree nuts, or shellfish.  · People who are allergic to:  ? Medicines.  ? Latex.  ? Insects.  ? Animals.  ? Pollen.  · People who have certain medical conditions, including lupus or thyroid disease.    What are the signs or symptoms?  The main symptom  of this condition is raised, itchy red or white bumps or patches on your skin. These areas may:  · Become large and swollen (welts).  · Change in shape and location, quickly and repeatedly.  · Be separate hives or connect over a large area of skin.  · Sting or become painful.  · Turn white when pressed in the center (bryce).    In severe cases, your hands, feet, and face may also become swollen. This may occur if hives develop deeper in your skin.  How is this diagnosed?  This condition is diagnosed based on your symptoms, medical history, and physical exam. Your skin, urine, or blood may be tested to find out what is causing your hives and to rule out other health issues. Your health care provider may also remove a small sample of skin from the affected area and examine it under a microscope (biopsy).  How is this treated?  Treatment depends on the severity of your condition. Your health care provider may recommend using cool, wet cloths (cool compresses) or taking cool showers to relieve itching. Hives are sometimes treated with medicines, including:  · Antihistamines.  · Corticosteroids.  · Antibiotics.  · An injectable medicine (omalizumab). Your health care provider may prescribe this if you have chronic idiopathic hives and you continue to have symptoms even after treatment with antihistamines.    Severe cases may require an emergency injection of adrenaline (epinephrine) to prevent a life-threatening allergic reaction (anaphylaxis).  Follow these instructions at home:  Medicines  · Take or apply over-the-counter and prescription medicines only as told by your health care provider.  · If you were prescribed an antibiotic medicine, use it as told by your health care provider. Do not stop taking the antibiotic even if you start to feel better.  Skin Care  · Apply cool compresses to the affected areas.  · Do not scratch or rub your skin.  General instructions  · Do not take hot showers or baths. This can make  itching worse.  · Do not wear tight-fitting clothing.  · Use sunscreen and wear protective clothing when you are outside.  · Avoid any substances that cause your hives. Keep a journal to help you track what causes your hives. Write down:  ? What medicines you take.  ? What you eat and drink.  ? What products you use on your skin.  · Keep all follow-up visits as told by your health care provider. This is important.  Contact a health care provider if:  · Your symptoms are not controlled with medicine.  · Your joints are painful or swollen.  Get help right away if:  · You have a fever.  · You have pain in your abdomen.  · Your tongue or lips are swollen.  · Your eyelids are swollen.  · Your chest or throat feels tight.  · You have trouble breathing or swallowing.  These symptoms may represent a serious problem that is an emergency. Do not wait to see if the symptoms will go away. Get medical help right away. Call your local emergency services (911 in the [place] Do not drive yourself to the hospital.  This information is not intended to replace advice given to you by your health care provider. Make sure you discuss any questions you have with your health care provider.  Document Released: 12/18/2006 Document Revised: 05/17/2017 Document Reviewed: 10/05/2016  Elsevier Interactive Patient Education © 2018 [employer]  .

## 2018-11-08 NOTE — PROGRESS NOTES
Chief Complaint   Patient presents with   • Rash     Subjective   Maribel Whitaker is a 42 y.o. female who presents to the clinic today with complaints itchy rash on her right arm. She reports she has not really been outside except to go somewhere. No one else in the house has a rash. She has two cat scratches on same arm, but she reports her cat scratched yesterday well after she noticed rash with no other scratches noted. She reports she does not remember getting any mosquito or bug bites and has changed nothing she uses as far as body or cleaning products. She first noticed rash 3 days ago.   Rash   This is a new problem. The current episode started in the past 7 days. The problem has been gradually worsening since onset. The affected locations include the right arm. The rash is characterized by itchiness and redness. She was exposed to nothing. Pertinent negatives include no anorexia, congestion, cough, diarrhea, eye pain, facial edema, fatigue, fever, joint pain, nail changes, rhinorrhea, shortness of breath, sore throat or vomiting. Past treatments include anti-itch cream. The treatment provided mild relief. Her past medical history is significant for allergies. There is no history of asthma, eczema or varicella.         Current Outpatient Prescriptions:   •  ALPRAZolam (XANAX) 1 MG tablet, , Disp: , Rfl:   •  baclofen (LIORESAL) 10 MG tablet, , Disp: , Rfl:   •  DULoxetine (CYMBALTA) 30 MG capsule, , Disp: , Rfl:   •  gabapentin (NEURONTIN) 300 MG capsule, , Disp: , Rfl:   •  lamoTRIgine (LaMICtal) 150 MG tablet, Take 150 mg by mouth 2 (Two) Times a Day., Disp: , Rfl:   •  lisinopril (PRINIVIL,ZESTRIL) 20 MG tablet, Take 20 mg by mouth Daily., Disp: , Rfl:   •  traZODone (DESYREL) 150 MG tablet, , Disp: , Rfl:   •  triamcinolone (KENALOG) 0.1 % ointment, Apply  topically to the appropriate area as directed 3 (Three) Times a Day. To rash on right arm, Disp: 30 g, Rfl: 0    Allergies:  Patient has no known  allergies.    Past Medical History:   Diagnosis Date   • Acid reflux    • ADHD (attention deficit hyperactivity disorder)    • Allergic    • Anxiety    • Arthritis    • Depression    • Fibromyalgia    • Hypertension    • Migraines      Past Surgical History:   Procedure Laterality Date   •  SECTION     • CHOLECYSTECTOMY       Family History   Problem Relation Age of Onset   • Cancer Mother    • Diabetes Mother    • Obesity Mother    • Heart disease Father    • Diabetes Father    • Obesity Father    • Breast cancer Maternal Aunt      Social History   Substance Use Topics   • Smoking status: Never Smoker   • Smokeless tobacco: Never Used   • Alcohol use No       Review of Systems  Review of Systems   Constitutional: Negative for fatigue and fever.   HENT: Negative for congestion, rhinorrhea and sore throat.    Eyes: Negative for pain.   Respiratory: Negative for cough and shortness of breath.    Gastrointestinal: Negative for anorexia, diarrhea and vomiting.   Musculoskeletal: Negative for joint pain.   Skin: Positive for rash. Negative for nail changes.       Objective   /78 (BP Location: Left arm, Patient Position: Sitting, Cuff Size: Large Adult)   Pulse 78   Temp 97.4 °F (36.3 °C) (Tympanic)   Resp 16   LMP 10/18/2018 (Approximate)   SpO2 98%   Breastfeeding? No       Physical Exam   Constitutional: She appears well-developed and well-nourished.   Cardiovascular: Normal rate, regular rhythm and normal heart sounds.    Pulmonary/Chest: Effort normal and breath sounds normal.   Lymphadenopathy:        Right axillary: No pectoral and no lateral adenopathy present.   Skin: Skin is warm and dry. Rash (hives like rash on dorsal side of right upper arm and smaller area on right forearm with one lesion at right thumb base ) noted.   Psychiatric: She has a normal mood and affect. Her behavior is normal.   Vitals reviewed.      Assessment/Plan     Maribel was seen today for rash.    Diagnoses and all  orders for this visit:    Rash in adult    Hives    Other orders  -     triamcinolone (KENALOG) 0.1 % ointment; Apply  topically to the appropriate area as directed 3 (Three) Times a Day. To rash on right arm         Benadryl for itching  Follow up if rash spreads

## 2019-01-02 ENCOUNTER — APPOINTMENT (OUTPATIENT)
Dept: LAB | Facility: HOSPITAL | Age: 44
End: 2019-01-02

## 2019-01-02 ENCOUNTER — TRANSCRIBE ORDERS (OUTPATIENT)
Dept: ADMINISTRATIVE | Facility: HOSPITAL | Age: 44
End: 2019-01-02

## 2019-01-02 ENCOUNTER — OFFICE VISIT (OUTPATIENT)
Dept: RETAIL CLINIC | Facility: CLINIC | Age: 44
End: 2019-01-02

## 2019-01-02 VITALS
HEART RATE: 78 BPM | DIASTOLIC BLOOD PRESSURE: 71 MMHG | TEMPERATURE: 97.2 F | OXYGEN SATURATION: 98 % | SYSTOLIC BLOOD PRESSURE: 119 MMHG | RESPIRATION RATE: 18 BRPM

## 2019-01-02 DIAGNOSIS — Z79.899 ENCOUNTER FOR LONG-TERM (CURRENT) USE OF MEDICATIONS: Primary | ICD-10-CM

## 2019-01-02 DIAGNOSIS — H65.03 BILATERAL ACUTE SEROUS OTITIS MEDIA, RECURRENCE NOT SPECIFIED: Primary | ICD-10-CM

## 2019-01-02 DIAGNOSIS — J30.1 ALLERGIC RHINITIS DUE TO POLLEN, UNSPECIFIED SEASONALITY: ICD-10-CM

## 2019-01-02 PROCEDURE — 80307 DRUG TEST PRSMV CHEM ANLYZR: CPT | Performed by: PSYCHIATRY & NEUROLOGY

## 2019-01-02 PROCEDURE — 99213 OFFICE O/P EST LOW 20 MIN: CPT | Performed by: NURSE PRACTITIONER

## 2019-01-02 RX ORDER — FLUTICASONE PROPIONATE 50 MCG
2 SPRAY, SUSPENSION (ML) NASAL DAILY
Qty: 18.2 ML | Refills: 0 | Status: SHIPPED | OUTPATIENT
Start: 2019-01-02 | End: 2019-09-03

## 2019-01-02 RX ORDER — AMOXICILLIN AND CLAVULANATE POTASSIUM 875; 125 MG/1; MG/1
1 TABLET, FILM COATED ORAL 2 TIMES DAILY
Qty: 20 TABLET | Refills: 0 | Status: SHIPPED | OUTPATIENT
Start: 2019-01-02 | End: 2019-01-12

## 2019-01-02 NOTE — PATIENT INSTRUCTIONS
Follow up with KAYLYNN Corona if symptoms persist     Allergic Rhinitis, Adult  Allergic rhinitis is an allergic reaction that affects the mucous membrane inside the nose. It causes sneezing, a runny or stuffy nose, and the feeling of mucus going down the back of the throat (postnasal drip). Allergic rhinitis can be mild to severe.  There are two types of allergic rhinitis:  · Seasonal. This type is also called hay fever. It happens only during certain seasons.  · Perennial. This type can happen at any time of the year.    What are the causes?  This condition happens when the body's defense system (immune system) responds to certain harmless substances called allergens as though they were germs.   Seasonal allergic rhinitis is triggered by pollen, which can come from grasses, trees, and weeds. Perennial allergic rhinitis may be caused by:  · House dust mites.  · Pet dander.  · Mold spores.    What are the signs or symptoms?  Symptoms of this condition include:  · Sneezing.  · Runny or stuffy nose (nasal congestion).  · Postnasal drip.  · Itchy nose.  · Tearing of the eyes.  · Trouble sleeping.  · Daytime sleepiness.    How is this diagnosed?  This condition may be diagnosed based on:  · Your medical history.  · A physical exam.  · Tests to check for related conditions, such as:  ? Asthma.  ? Pink eye.  ? Ear infection.  ? Upper respiratory infection.  · Tests to find out which allergens trigger your symptoms. These may include skin or blood tests.    How is this treated?  There is no cure for this condition, but treatment can help control symptoms. Treatment may include:  · Taking medicines that block allergy symptoms, such as antihistamines. Medicine may be given as a shot, nasal spray, or pill.  · Avoiding the allergen.  · Desensitization. This treatment involves getting ongoing shots until your body becomes less sensitive to the allergen. This treatment may be done if other treatments do not help.  · If  taking medicine and avoiding the allergen does not work, new, stronger medicines may be prescribed.    Follow these instructions at home:  · Find out what you are allergic to. Common allergens include smoke, dust, and pollen.  · Avoid the things you are allergic to. These are some things you can do to help avoid allergens:  ? Replace carpet with wood, tile, or vinyl joey. Carpet can trap dander and dust.  ? Do not smoke. Do not allow smoking in your home.  ? Change your heating and air conditioning filter at least once a month.  ? During allergy season:  § Keep windows closed as much as possible.  § Plan outdoor activities when pollen counts are lowest. This is usually during the evening hours.  § When coming indoors, change clothing and shower before sitting on furniture or bedding.  · Take over-the-counter and prescription medicines only as told by your health care provider.  · Keep all follow-up visits as told by your health care provider. This is important.  Contact a health care provider if:  · You have a fever.  · You develop a persistent cough.  · You make whistling sounds when you breathe (you wheeze).  · Your symptoms interfere with your normal daily activities.  Get help right away if:  · You have shortness of breath.  Summary  · This condition can be managed by taking medicines as directed and avoiding allergens.  · Contact your health care provider if you develop a persistent cough or fever.  · During allergy season, keep windows closed as much as possible.  This information is not intended to replace advice given to you by your health care provider. Make sure you discuss any questions you have with your health care provider.  Document Released: 09/12/2002 Document Revised: 01/25/2018 Document Reviewed: 01/25/2018  Elsevier Interactive Patient Education © 2018 [employer]

## 2019-01-02 NOTE — PROGRESS NOTES
Chief Complaint   Patient presents with   • Earache     Subjective   Maribel Whitaker is a 43 y.o. female who presents to the clinic today with complaints bilateral earache. She has been having sinus drainage, nasal congestion, and runny nose which she attributed to her allergies. She feels these symptoms have improved, but now she has bilateral earache   Earache    There is pain in both ears. This is a new problem. The current episode started in the past 7 days. The problem occurs constantly. The problem has been gradually worsening. There has been no fever. The pain is moderate. Associated symptoms include rhinorrhea. Pertinent negatives include no abdominal pain, coughing, diarrhea, ear discharge, headaches, hearing loss, neck pain, rash, sore throat or vomiting. She has tried acetaminophen and NSAIDs for the symptoms. The treatment provided mild relief. There is no history of a chronic ear infection, hearing loss or a tympanostomy tube.         Current Outpatient Medications:   •  ALPRAZolam (XANAX) 1 MG tablet, , Disp: , Rfl:   •  baclofen (LIORESAL) 10 MG tablet, , Disp: , Rfl:   •  DULoxetine (CYMBALTA) 30 MG capsule, , Disp: , Rfl:   •  gabapentin (NEURONTIN) 300 MG capsule, , Disp: , Rfl:   •  lamoTRIgine (LaMICtal) 150 MG tablet, Take 150 mg by mouth 2 (Two) Times a Day., Disp: , Rfl:   •  lisinopril (PRINIVIL,ZESTRIL) 20 MG tablet, Take 20 mg by mouth Daily., Disp: , Rfl:   •  traZODone (DESYREL) 150 MG tablet, , Disp: , Rfl:   •  amoxicillin-clavulanate (AUGMENTIN) 875-125 MG per tablet, Take 1 tablet by mouth 2 (Two) Times a Day for 10 days., Disp: 20 tablet, Rfl: 0  •  fluticasone (FLONASE) 50 MCG/ACT nasal spray, 2 sprays into the nostril(s) as directed by provider Daily., Disp: 18.2 mL, Rfl: 0    Allergies:  Patient has no known allergies.    Past Medical History:   Diagnosis Date   • Acid reflux    • ADHD (attention deficit hyperactivity disorder)    • Allergic    • Anxiety    • Arthritis    •  Depression    • Fibromyalgia    • Hypertension    • Migraines      Past Surgical History:   Procedure Laterality Date   •  SECTION     • CHOLECYSTECTOMY       Family History   Problem Relation Age of Onset   • Cancer Mother    • Diabetes Mother    • Obesity Mother    • Heart disease Father    • Diabetes Father    • Obesity Father    • Breast cancer Maternal Aunt      Social History     Tobacco Use   • Smoking status: Never Smoker   • Smokeless tobacco: Never Used   Substance Use Topics   • Alcohol use: No   • Drug use: No       Review of Systems  Review of Systems   Constitutional: Negative for chills, fatigue and fever.   HENT: Positive for ear pain and rhinorrhea. Negative for ear discharge, hearing loss, sinus pressure, sinus pain, sneezing and sore throat.    Respiratory: Negative for cough.    Gastrointestinal: Negative for abdominal pain, diarrhea and vomiting.   Musculoskeletal: Negative for neck pain.   Skin: Negative for rash.   Neurological: Negative for headaches.   Hematological: Negative for adenopathy.       Objective   /71 (BP Location: Left arm, Patient Position: Sitting, Cuff Size: Adult)   Pulse 78   Temp 97.2 °F (36.2 °C) (Tympanic)   Resp 18   LMP 01/15/2018 (Exact Date)   SpO2 98%       Physical Exam   Constitutional: She appears well-developed and well-nourished.   HENT:   Head: Normocephalic and atraumatic.   Right Ear: Hearing, external ear and ear canal normal. Tympanic membrane is bulging. A middle ear effusion is present.   Left Ear: Hearing, external ear and ear canal normal. Tympanic membrane is erythematous and bulging. A middle ear effusion is present.   Nose: Mucosal edema and rhinorrhea present. Right sinus exhibits no maxillary sinus tenderness and no frontal sinus tenderness. Left sinus exhibits no maxillary sinus tenderness and no frontal sinus tenderness.   Mouth/Throat: Uvula is midline and mucous membranes are normal. Posterior oropharyngeal erythema (large  amount of thin clear secretions in posterior pharynz ) present. No oropharyngeal exudate, posterior oropharyngeal edema or tonsillar abscesses. Tonsils are 1+ on the right. Tonsils are 1+ on the left. No tonsillar exudate.   Eyes: Pupils are equal, round, and reactive to light.   Neck: Normal range of motion. Neck supple.   Cardiovascular: Normal rate, regular rhythm and normal heart sounds.   Pulmonary/Chest: Effort normal and breath sounds normal.   Lymphadenopathy:        Head (right side): No submental, no submandibular, no tonsillar, no preauricular, no posterior auricular and no occipital adenopathy present.        Head (left side): No submental, no submandibular, no tonsillar, no preauricular, no posterior auricular and no occipital adenopathy present.     She has no cervical adenopathy.   Skin: Skin is warm and dry.   Psychiatric: She has a normal mood and affect.   Vitals reviewed.      Assessment/Plan     Maribel was seen today for earache.    Diagnoses and all orders for this visit:    Bilateral acute serous otitis media, recurrence not specified    Allergic rhinitis due to pollen, unspecified seasonality    Other orders  -     amoxicillin-clavulanate (AUGMENTIN) 875-125 MG per tablet; Take 1 tablet by mouth 2 (Two) Times a Day for 10 days.  -     fluticasone (FLONASE) 50 MCG/ACT nasal spray; 2 sprays into the nostril(s) as directed by provider Daily.      Follow up with KAYLYNN Vaughn for further treatment.

## 2019-01-29 RX ORDER — FLUTICASONE PROPIONATE 50 MCG
SPRAY, SUSPENSION (ML) NASAL
Qty: 16 ML | Refills: 0 | OUTPATIENT
Start: 2019-01-29

## 2019-07-16 ENCOUNTER — APPOINTMENT (OUTPATIENT)
Dept: LAB | Facility: HOSPITAL | Age: 44
End: 2019-07-16

## 2019-07-16 ENCOUNTER — TRANSCRIBE ORDERS (OUTPATIENT)
Dept: ADMINISTRATIVE | Facility: HOSPITAL | Age: 44
End: 2019-07-16

## 2019-07-16 DIAGNOSIS — E55.9 VITAMIN D DEFICIENCY: ICD-10-CM

## 2019-07-16 DIAGNOSIS — D53.9 DEFICIENCY ANEMIA: Primary | ICD-10-CM

## 2019-07-16 LAB
25(OH)D3 SERPL-MCNC: 31.5 NG/ML (ref 30–100)
ALBUMIN SERPL-MCNC: 4.4 G/DL (ref 3.5–5)
ALBUMIN/GLOB SERPL: 1.5 G/DL (ref 1.1–2.5)
ALP SERPL-CCNC: 96 U/L (ref 24–120)
ALT SERPL W P-5'-P-CCNC: 16 U/L (ref 0–54)
ANION GAP SERPL CALCULATED.3IONS-SCNC: 7 MMOL/L (ref 4–13)
AST SERPL-CCNC: 31 U/L (ref 7–45)
BASOPHILS # BLD AUTO: 0.08 10*3/MM3 (ref 0–0.2)
BASOPHILS NFR BLD AUTO: 0.9 % (ref 0–2)
BILIRUB SERPL-MCNC: 0.3 MG/DL (ref 0.1–1)
BUN BLD-MCNC: 16 MG/DL (ref 5–21)
BUN/CREAT SERPL: 25.8 (ref 7–25)
CALCIUM SPEC-SCNC: 9.2 MG/DL (ref 8.4–10.4)
CHLORIDE SERPL-SCNC: 104 MMOL/L (ref 98–110)
CO2 SERPL-SCNC: 29 MMOL/L (ref 24–31)
CREAT BLD-MCNC: 0.62 MG/DL (ref 0.5–1.4)
DEPRECATED RDW RBC AUTO: 42 FL (ref 40–54)
EOSINOPHIL # BLD AUTO: 0.24 10*3/MM3 (ref 0–0.7)
EOSINOPHIL NFR BLD AUTO: 2.6 % (ref 0–4)
ERYTHROCYTE [DISTWIDTH] IN BLOOD BY AUTOMATED COUNT: 14.8 % (ref 12–15)
FERRITIN SERPL-MCNC: 7.78 NG/ML (ref 6.24–137)
GFR SERPL CREATININE-BSD FRML MDRD: 105 ML/MIN/1.73
GLOBULIN UR ELPH-MCNC: 2.9 GM/DL
GLUCOSE BLD-MCNC: 90 MG/DL (ref 70–100)
HCT VFR BLD AUTO: 39 % (ref 37–47)
HGB BLD-MCNC: 12.3 G/DL (ref 12–16)
IMM GRANULOCYTES # BLD AUTO: 0.03 10*3/MM3 (ref 0–0.05)
IMM GRANULOCYTES NFR BLD AUTO: 0.3 % (ref 0–5)
IRON 24H UR-MRATE: 32 MCG/DL (ref 42–180)
LYMPHOCYTES # BLD AUTO: 2.22 10*3/MM3 (ref 0.72–4.86)
LYMPHOCYTES NFR BLD AUTO: 24.4 % (ref 15–45)
MCH RBC QN AUTO: 24.8 PG (ref 28–32)
MCHC RBC AUTO-ENTMCNC: 31.5 G/DL (ref 33–36)
MCV RBC AUTO: 78.6 FL (ref 82–98)
MONOCYTES # BLD AUTO: 0.67 10*3/MM3 (ref 0.19–1.3)
MONOCYTES NFR BLD AUTO: 7.4 % (ref 4–12)
NEUTROPHILS # BLD AUTO: 5.87 10*3/MM3 (ref 1.87–8.4)
NEUTROPHILS NFR BLD AUTO: 64.4 % (ref 39–78)
NRBC BLD AUTO-RTO: 0 /100 WBC (ref 0–0.2)
PLATELET # BLD AUTO: 315 10*3/MM3 (ref 130–400)
PMV BLD AUTO: 9.1 FL (ref 6–12)
POTASSIUM BLD-SCNC: 4.1 MMOL/L (ref 3.5–5.3)
PROT SERPL-MCNC: 7.3 G/DL (ref 6.3–8.7)
RBC # BLD AUTO: 4.96 10*6/MM3 (ref 4.2–5.4)
SODIUM BLD-SCNC: 140 MMOL/L (ref 135–145)
VIT B12 BLD-MCNC: 626 PG/ML (ref 239–931)
WBC NRBC COR # BLD: 9.11 10*3/MM3 (ref 4.8–10.8)

## 2019-07-16 PROCEDURE — 80053 COMPREHEN METABOLIC PANEL: CPT | Performed by: CLINICAL NURSE SPECIALIST

## 2019-07-16 PROCEDURE — 36415 COLL VENOUS BLD VENIPUNCTURE: CPT | Performed by: CLINICAL NURSE SPECIALIST

## 2019-07-16 PROCEDURE — 82728 ASSAY OF FERRITIN: CPT | Performed by: CLINICAL NURSE SPECIALIST

## 2019-07-16 PROCEDURE — 82306 VITAMIN D 25 HYDROXY: CPT | Performed by: CLINICAL NURSE SPECIALIST

## 2019-07-16 PROCEDURE — 85025 COMPLETE CBC W/AUTO DIFF WBC: CPT | Performed by: CLINICAL NURSE SPECIALIST

## 2019-07-16 PROCEDURE — 84425 ASSAY OF VITAMIN B-1: CPT | Performed by: CLINICAL NURSE SPECIALIST

## 2019-07-16 PROCEDURE — 83540 ASSAY OF IRON: CPT | Performed by: CLINICAL NURSE SPECIALIST

## 2019-07-16 PROCEDURE — 82607 VITAMIN B-12: CPT | Performed by: CLINICAL NURSE SPECIALIST

## 2019-07-18 LAB — VIT B1 BLD-SCNC: 113.9 NMOL/L (ref 66.5–200)

## 2019-07-29 ENCOUNTER — TRANSCRIBE ORDERS (OUTPATIENT)
Dept: ADMINISTRATIVE | Facility: HOSPITAL | Age: 44
End: 2019-07-29

## 2019-07-29 DIAGNOSIS — M47.812 CERVICAL FACET JOINT SYNDROME: Primary | ICD-10-CM

## 2019-08-02 ENCOUNTER — HOSPITAL ENCOUNTER (OUTPATIENT)
Dept: MRI IMAGING | Facility: HOSPITAL | Age: 44
Discharge: HOME | End: 2019-08-02
Admitting: PAIN MEDICINE

## 2019-08-02 DIAGNOSIS — M47.812 CERVICAL FACET JOINT SYNDROME: ICD-10-CM

## 2019-08-02 PROCEDURE — 72141 MRI NECK SPINE W/O DYE: CPT

## 2019-08-07 ENCOUNTER — OFFICE VISIT (OUTPATIENT)
Dept: RETAIL CLINIC | Facility: CLINIC | Age: 44
End: 2019-08-07

## 2019-08-07 VITALS
SYSTOLIC BLOOD PRESSURE: 100 MMHG | HEART RATE: 94 BPM | OXYGEN SATURATION: 96 % | TEMPERATURE: 99.1 F | DIASTOLIC BLOOD PRESSURE: 70 MMHG

## 2019-08-07 DIAGNOSIS — L03.115 CELLULITIS OF RIGHT LOWER EXTREMITY: Primary | ICD-10-CM

## 2019-08-07 DIAGNOSIS — R21 RASH IN ADULT: ICD-10-CM

## 2019-08-07 PROCEDURE — 99213 OFFICE O/P EST LOW 20 MIN: CPT | Performed by: NURSE PRACTITIONER

## 2019-08-07 RX ORDER — TRAMADOL HYDROCHLORIDE 50 MG/1
50 TABLET ORAL 2 TIMES DAILY
COMMUNITY

## 2019-08-07 RX ORDER — PROMETHAZINE HYDROCHLORIDE 12.5 MG/1
TABLET ORAL
COMMUNITY
End: 2019-12-30

## 2019-08-07 RX ORDER — MECLIZINE HYDROCHLORIDE 25 MG/1
25 TABLET ORAL 2 TIMES DAILY PRN
Refills: 0 | COMMUNITY
Start: 2019-06-13 | End: 2019-12-30

## 2019-08-07 RX ORDER — DULOXETIN HYDROCHLORIDE 60 MG/1
60 CAPSULE, DELAYED RELEASE ORAL DAILY
Refills: 3 | COMMUNITY
Start: 2019-07-10

## 2019-08-07 RX ORDER — PANTOPRAZOLE SODIUM 40 MG/1
TABLET, DELAYED RELEASE ORAL
COMMUNITY
End: 2021-05-07 | Stop reason: SDUPTHER

## 2019-08-07 RX ORDER — SULFAMETHOXAZOLE AND TRIMETHOPRIM 800; 160 MG/1; MG/1
1 TABLET ORAL 2 TIMES DAILY
Qty: 20 TABLET | Refills: 0 | Status: SHIPPED | OUTPATIENT
Start: 2019-08-07 | End: 2019-08-17

## 2019-08-07 RX ORDER — TRIAMCINOLONE ACETONIDE 1 MG/G
CREAM TOPICAL 2 TIMES DAILY
Qty: 15 G | Refills: 0 | Status: SHIPPED | OUTPATIENT
Start: 2019-08-07 | End: 2019-12-30

## 2019-08-07 NOTE — PROGRESS NOTES
"Subjective   Maribel Whitaker is a 43 y.o. female.     Rash   This is a new problem. The current episode started today. The problem has been gradually worsening since onset. Location: Right leg; Started low on the ankle but has noticed patches forming up the leg. Rash characteristics: Feels like \"adams horse\"; denies itching. She was exposed to nothing (Was outside yesterday; denies anything new.  Does not remember any bug bites from yesterday). Pertinent negatives include no diarrhea, facial edema, fever, joint pain, shortness of breath or vomiting. Past treatments include nothing. (Cellulitis)    Patient states her son has a small rash but it doesn't look the same as hers.  She reports a history of cellulitis.     The following portions of the patient's history were reviewed and updated as appropriate: allergies, current medications, past family history, past medical history, past social history, past surgical history and problem list.    Review of Systems   Constitutional: Negative for fever.   HENT: Negative for facial swelling.    Respiratory: Negative for shortness of breath.    Gastrointestinal: Negative for diarrhea, nausea and vomiting.   Musculoskeletal: Negative for joint pain and myalgias.   Skin: Positive for rash.       Objective   Physical Exam   Constitutional: She appears well-developed and well-nourished. She does not appear ill. No distress.   HENT:   Mouth/Throat: Oropharynx is clear and moist. No posterior oropharyngeal edema or posterior oropharyngeal erythema.   Cardiovascular: Normal rate, regular rhythm and normal heart sounds. Exam reveals no gallop and no friction rub.   No murmur heard.  Pulses:       Dorsalis pedis pulses are 2+ on the right side.   Pulmonary/Chest: Effort normal and breath sounds normal. No stridor. No respiratory distress. She has no decreased breath sounds. She has no wheezes. She has no rhonchi. She has no rales.   Neurological: She is alert.   Skin: Skin is warm " and dry. Rash noted. She is not diaphoretic. There is erythema.   Right ankle is warm and erythematous with tenderness to palpation.  There is no edema.  No breaks in the skin noted or entry site from insect bite.  There are patches of raised erythema, almost urticarial in appearance, to the area below the knee, and the thigh.  These areas are non-tender and not itchy.    Psychiatric: She has a normal mood and affect. Her behavior is normal. Thought content normal.         Assessment/Plan   Maribel was seen today for rash.    Diagnoses and all orders for this visit:    Cellulitis of right lower extremity    Rash in adult    Other orders  -     sulfamethoxazole-trimethoprim (BACTRIM DS,SEPTRA DS) 800-160 MG per tablet; Take 1 tablet by mouth 2 (Two) Times a Day for 10 days.  -     triamcinolone (KENALOG) 0.1 % cream; Apply  topically to the appropriate area as directed 2 (Two) Times a Day.        Start taking benadryl for help with allergic response.   Apply topical cream to blotches to upper leg.  Do not apply to the face  If no improvement or symptoms worsen over the next 24-48 hours, seek further evaluation at higher level of care.

## 2019-08-07 NOTE — PATIENT INSTRUCTIONS
Start taking benadryl for help with allergic response.   Apply topical cream to blotches to upper leg.  Do not apply to the face  If no improvement or symptoms worsen over the next 24-48 hours, seek further evaluation at higher level of care.           Cellulitis, Adult    Cellulitis is a skin infection. The infected area is usually red and sore. This condition occurs most often in the arms and lower legs. It is very important to get treated for this condition.  Follow these instructions at home:  · Take over-the-counter and prescription medicines only as told by your doctor.  · If you were prescribed an antibiotic medicine, take it as told by your doctor. Do not stop taking the antibiotic even if you start to feel better.  · Drink enough fluid to keep your pee (urine) pale yellow.  · Do not touch or rub the infected area.  · Raise (elevate) the infected area above the level of your heart while you are sitting or lying down.  · Place warm or cold wet cloths (warm or cold compresses) on the infected area. Do this as told by your doctor.  · Keep all follow-up visits as told by your doctor. This is important. These visits let your doctor make sure your infection is not getting worse.  Contact a doctor if:  · You have a fever.  · Your symptoms do not get better after 1-2 days of treatment.  · Your bone or joint under the infected area starts to hurt after the skin has healed.  · Your infection comes back. This can happen in the same area or another area.  · You have a swollen bump in the infected area.  · You have new symptoms.  · You feel ill and also have muscle aches and pains.  Get help right away if:  · Your symptoms get worse.  · You feel very sleepy.  · You throw up (vomit) or have watery poop (diarrhea) for a long time.  · There are red streaks coming from the infected area.  · Your red area gets larger.  · Your red area turns darker.  This information is not intended to replace advice given to you by your  health care provider. Make sure you discuss any questions you have with your health care provider.  Document Released: 06/05/2009 Document Revised: 08/06/2018 Document Reviewed: 10/26/2016  [employer] Interactive Patient Education © 2019 [employer]

## 2019-08-09 ENCOUNTER — TRANSCRIBE ORDERS (OUTPATIENT)
Dept: ADMINISTRATIVE | Facility: HOSPITAL | Age: 44
End: 2019-08-09

## 2019-08-09 ENCOUNTER — HOSPITAL ENCOUNTER (OUTPATIENT)
Dept: GENERAL RADIOLOGY | Facility: HOSPITAL | Age: 44
Discharge: HOME | End: 2019-08-09
Admitting: PAIN MEDICINE

## 2019-08-09 DIAGNOSIS — M47.812 ARTHROPATHY OF CERVICAL FACET JOINT: Primary | ICD-10-CM

## 2019-08-09 PROCEDURE — 72040 X-RAY EXAM NECK SPINE 2-3 VW: CPT

## 2019-08-10 ENCOUNTER — HOSPITAL ENCOUNTER (EMERGENCY)
Facility: HOSPITAL | Age: 44
Discharge: HOME | End: 2019-08-10
Admitting: EMERGENCY MEDICINE

## 2019-08-10 VITALS
DIASTOLIC BLOOD PRESSURE: 45 MMHG | TEMPERATURE: 97.8 F | HEART RATE: 67 BPM | HEIGHT: 62 IN | BODY MASS INDEX: 42.69 KG/M2 | OXYGEN SATURATION: 100 % | SYSTOLIC BLOOD PRESSURE: 111 MMHG | WEIGHT: 232 LBS | RESPIRATION RATE: 15 BRPM

## 2019-08-10 DIAGNOSIS — L03.115 CELLULITIS OF RIGHT LOWER EXTREMITY: ICD-10-CM

## 2019-08-10 DIAGNOSIS — T63.301A SPIDER BITE WOUND, ACCIDENTAL OR UNINTENTIONAL, INITIAL ENCOUNTER: Primary | ICD-10-CM

## 2019-08-10 LAB
ALBUMIN SERPL-MCNC: 4 G/DL (ref 3.5–5)
ALBUMIN/GLOB SERPL: 1.3 G/DL (ref 1.1–2.5)
ALP SERPL-CCNC: 109 U/L (ref 24–120)
ALT SERPL W P-5'-P-CCNC: 21 U/L (ref 0–54)
ANION GAP SERPL CALCULATED.3IONS-SCNC: 7 MMOL/L (ref 4–13)
AST SERPL-CCNC: 32 U/L (ref 7–45)
BASOPHILS # BLD AUTO: 0.06 10*3/MM3 (ref 0–0.2)
BASOPHILS NFR BLD AUTO: 0.7 % (ref 0–1.5)
BILIRUB SERPL-MCNC: 0.3 MG/DL (ref 0.1–1)
BUN BLD-MCNC: 13 MG/DL (ref 5–21)
BUN/CREAT SERPL: 18.1 (ref 7–25)
CALCIUM SPEC-SCNC: 9.1 MG/DL (ref 8.4–10.4)
CHLORIDE SERPL-SCNC: 104 MMOL/L (ref 98–110)
CO2 SERPL-SCNC: 28 MMOL/L (ref 24–31)
CREAT BLD-MCNC: 0.72 MG/DL (ref 0.5–1.4)
DEPRECATED RDW RBC AUTO: 40.6 FL (ref 37–54)
EOSINOPHIL # BLD AUTO: 0.18 10*3/MM3 (ref 0–0.4)
EOSINOPHIL NFR BLD AUTO: 2.2 % (ref 0.3–6.2)
ERYTHROCYTE [DISTWIDTH] IN BLOOD BY AUTOMATED COUNT: 14.7 % (ref 12.3–15.4)
GFR SERPL CREATININE-BSD FRML MDRD: 88 ML/MIN/1.73
GLOBULIN UR ELPH-MCNC: 3.2 GM/DL
GLUCOSE BLD-MCNC: 94 MG/DL (ref 70–100)
HCT VFR BLD AUTO: 32.5 % (ref 34–46.6)
HGB BLD-MCNC: 10.8 G/DL (ref 12–15.9)
IMM GRANULOCYTES # BLD AUTO: 0.15 10*3/MM3 (ref 0–0.05)
IMM GRANULOCYTES NFR BLD AUTO: 1.9 % (ref 0–0.5)
LYMPHOCYTES # BLD AUTO: 2.46 10*3/MM3 (ref 0.7–3.1)
LYMPHOCYTES NFR BLD AUTO: 30.5 % (ref 19.6–45.3)
MCH RBC QN AUTO: 25.4 PG (ref 26.6–33)
MCHC RBC AUTO-ENTMCNC: 33.2 G/DL (ref 31.5–35.7)
MCV RBC AUTO: 76.5 FL (ref 79–97)
MONOCYTES # BLD AUTO: 0.64 10*3/MM3 (ref 0.1–0.9)
MONOCYTES NFR BLD AUTO: 7.9 % (ref 5–12)
NEUTROPHILS # BLD AUTO: 4.57 10*3/MM3 (ref 1.7–7)
NEUTROPHILS NFR BLD AUTO: 56.8 % (ref 42.7–76)
NRBC BLD AUTO-RTO: 0 /100 WBC (ref 0–0.2)
PLATELET # BLD AUTO: 279 10*3/MM3 (ref 140–450)
PMV BLD AUTO: 9.4 FL (ref 6–12)
POTASSIUM BLD-SCNC: 3.9 MMOL/L (ref 3.5–5.3)
PROT SERPL-MCNC: 7.2 G/DL (ref 6.3–8.7)
RBC # BLD AUTO: 4.25 10*6/MM3 (ref 3.77–5.28)
SODIUM BLD-SCNC: 139 MMOL/L (ref 135–145)
WBC NRBC COR # BLD: 8.06 10*3/MM3 (ref 3.4–10.8)

## 2019-08-10 PROCEDURE — 87040 BLOOD CULTURE FOR BACTERIA: CPT | Performed by: NURSE PRACTITIONER

## 2019-08-10 PROCEDURE — 96365 THER/PROPH/DIAG IV INF INIT: CPT

## 2019-08-10 PROCEDURE — 80053 COMPREHEN METABOLIC PANEL: CPT | Performed by: NURSE PRACTITIONER

## 2019-08-10 PROCEDURE — 85025 COMPLETE CBC W/AUTO DIFF WBC: CPT | Performed by: NURSE PRACTITIONER

## 2019-08-10 PROCEDURE — 99283 EMERGENCY DEPT VISIT LOW MDM: CPT

## 2019-08-10 RX ORDER — CLINDAMYCIN PHOSPHATE 900 MG/50ML
900 INJECTION INTRAVENOUS ONCE
Status: COMPLETED | OUTPATIENT
Start: 2019-08-10 | End: 2019-08-10

## 2019-08-10 RX ADMIN — CLINDAMYCIN IN 5 PERCENT DEXTROSE 900 MG: 18 INJECTION, SOLUTION INTRAVENOUS at 20:51

## 2019-08-11 NOTE — ED PROVIDER NOTES
Subjective   Patient is a 43-year-old female presents emergency department with chief complaints of possible spider bite.  Patient states 2 days ago upon awakening she noted a small reddened area to her right lower extremity.  She states it was circular in nature with a long streak up her leg.  Patient was evaluated at a local urgent care in [facility] and prescribed Bactrim for this issue.  Patient states since this time she has slightly worsening redness and discomfort to her leg and elected to come the emergency department for evaluation and treatment.  Patient has had no recorded fevers.  She denies any vomiting.  Streaking however of the leg has actually improved.  She relates however the rash appears to be somewhat more significant and felt this needed to be further evaluated.        Other   Severity:  Moderate  Chronicity:  New  Context:  Possible spider bite to the right lower extremity  Associated symptoms: rash    Associated symptoms: no abdominal pain, no chest pain, no congestion, no cough, no diarrhea, no fever and no vomiting        Review of Systems   Constitutional: Negative.  Negative for fever.   HENT: Negative.  Negative for congestion.    Respiratory: Negative.  Negative for cough.    Cardiovascular: Negative.  Negative for chest pain.   Gastrointestinal: Negative.  Negative for abdominal pain, diarrhea and vomiting.   Musculoskeletal: Negative.    Skin: Positive for rash.   All other systems reviewed and are negative.      Past Medical History:   Diagnosis Date   • Acid reflux    • ADHD (attention deficit hyperactivity disorder)    • Allergic    • Anxiety    • Arthritis    • Depression    • Fibromyalgia    • Hypertension    • Migraines        No Known Allergies    Past Surgical History:   Procedure Laterality Date   •  SECTION     • CHOLECYSTECTOMY     • GASTRIC SLEEVE LAPAROSCOPIC         Family History   Problem Relation Age of Onset   • Cancer Mother    • Diabetes Mother    • Obesity  Mother    • Heart disease Father    • Diabetes Father    • Obesity Father    • Breast cancer Maternal Aunt        Social History     Socioeconomic History   • Marital status:      Spouse name: Not on file   • Number of children: Not on file   • Years of education: Not on file   • Highest education level: Not on file   Tobacco Use   • Smoking status: Never Smoker   • Smokeless tobacco: Never Used   Substance and Sexual Activity   • Alcohol use: No   • Drug use: No   • Sexual activity: Yes     Birth control/protection: Condom           Objective   Physical Exam   Constitutional: She is oriented to person, place, and time. She appears well-developed and well-nourished.   HENT:   Head: Normocephalic and atraumatic.   Right Ear: External ear normal.   Left Ear: External ear normal.   Nose: Nose normal.   Eyes: Conjunctivae and EOM are normal. Pupils are equal, round, and reactive to light.   Neck: Normal range of motion. Neck supple.   Cardiovascular: Normal rate, regular rhythm, normal heart sounds and intact distal pulses.   Pulmonary/Chest: Effort normal and breath sounds normal.   Abdominal: Soft. Bowel sounds are normal.   Musculoskeletal: Normal range of motion.   Neurological: She is alert and oriented to person, place, and time.   Skin: Skin is warm and dry. Capillary refill takes less than 2 seconds. There is erythema.   Erythema noted to the distal portion of the right lower  Extremity in particular to the posterior aspect of the lower leg without streaking of the skin identified, no warmth identified, no abscess identified, neurovascular intact   Psychiatric: She has a normal mood and affect. Her behavior is normal. Judgment and thought content normal.   Nursing note and vitals reviewed.      Procedures           ED Course labs are unremarkable. She received a dose of clindamycin in the ER. She has only had 3 doses of bactrim since sxs began. Recommend to remain on this medication and f/u with pcp on  Monday. She will need to return with worsening sxs. She tells this examiner streaking of the leg has actually improved.                  MDM  Number of Diagnoses or Management Options  Cellulitis of right lower extremity: new and requires workup  Spider bite wound, accidental or unintentional, initial encounter: new and requires workup     Amount and/or Complexity of Data Reviewed  Clinical lab tests: ordered and reviewed  Discuss the patient with other providers: yes    Risk of Complications, Morbidity, and/or Mortality  Presenting problems: moderate  Diagnostic procedures: moderate  Management options: moderate    Patient Progress  Patient progress: improved        Final diagnoses:   Spider bite wound, accidental or unintentional, initial encounter   Cellulitis of right lower extremity            Mami Alfaro, APRN  08/10/19 [id number]

## 2019-08-15 LAB
BACTERIA SPEC AEROBE CULT: NORMAL
BACTERIA SPEC AEROBE CULT: NORMAL

## 2019-09-03 ENCOUNTER — OFFICE VISIT (OUTPATIENT)
Dept: RETAIL CLINIC | Facility: CLINIC | Age: 44
End: 2019-09-03

## 2019-09-03 VITALS
TEMPERATURE: 98.2 F | BODY MASS INDEX: 41.34 KG/M2 | RESPIRATION RATE: 16 BRPM | OXYGEN SATURATION: 96 % | WEIGHT: 226 LBS | HEART RATE: 81 BPM

## 2019-09-03 DIAGNOSIS — H69.83 EUSTACHIAN TUBE DYSFUNCTION, BILATERAL: ICD-10-CM

## 2019-09-03 DIAGNOSIS — J01.20 ACUTE ETHMOIDAL SINUSITIS, RECURRENCE NOT SPECIFIED: Primary | ICD-10-CM

## 2019-09-03 PROCEDURE — 99213 OFFICE O/P EST LOW 20 MIN: CPT | Performed by: NURSE PRACTITIONER

## 2019-09-03 RX ORDER — GUAIFENESIN 600 MG/1
1200 TABLET, EXTENDED RELEASE ORAL 2 TIMES DAILY
Qty: 40 TABLET | Refills: 0 | Status: SHIPPED | OUTPATIENT
Start: 2019-09-03 | End: 2019-09-13

## 2019-09-03 RX ORDER — MOMETASONE FUROATE 50 UG/1
2 SPRAY, METERED NASAL DAILY
Qty: 17 G | Refills: 12 | Status: SHIPPED | OUTPATIENT
Start: 2019-09-03 | End: 2021-08-10

## 2019-09-03 RX ORDER — CEFDINIR 300 MG/1
600 CAPSULE ORAL DAILY
Qty: 20 CAPSULE | Refills: 0 | Status: SHIPPED | OUTPATIENT
Start: 2019-09-03 | End: 2019-09-13

## 2019-09-03 RX ORDER — SULFAMETHOXAZOLE AND TRIMETHOPRIM 800; 160 MG/1; MG/1
TABLET ORAL
COMMUNITY
End: 2019-09-03

## 2019-09-03 NOTE — PROGRESS NOTES
Subjective   Maribel Whitaker is a 43 y.o. female.     Sinus Problem   This is a new problem. The current episode started 1 to 4 weeks ago. The problem has been gradually worsening since onset. There has been no fever. Her pain is at a severity of 4/10. The pain is moderate. Associated symptoms include congestion, ear pain, headaches, sinus pressure, a sore throat and swollen glands. Pertinent negatives include no chills, coughing, neck pain or shortness of breath. Treatments tried: zyrtec, benadryl. The treatment provided no relief.        The following portions of the patient's history were reviewed and updated as appropriate: allergies, current medications, past family history, past medical history, past social history, past surgical history and problem list.    Review of Systems   Constitutional: Negative for chills and fever.   HENT: Positive for congestion, ear pain, sinus pressure, sore throat and swollen glands.    Respiratory: Negative for cough, chest tightness and shortness of breath.    Cardiovascular: Negative for chest pain.   Musculoskeletal: Negative for neck pain and neck stiffness.   Allergic/Immunologic: Positive for environmental allergies.   Neurological: Positive for headache. Negative for dizziness.   Hematological: Positive for adenopathy.       Objective      Pulse 81   Temp 98.2 °F (36.8 °C)   Resp 16   Wt 103 kg (226 lb)   SpO2 96%   BMI 41.34 kg/m²     Physical Exam   Constitutional: She is oriented to person, place, and time. No distress.   obese   HENT:   Head: Normocephalic and atraumatic.   Right Ear: Hearing, tympanic membrane, external ear and ear canal normal.   Left Ear: Hearing, tympanic membrane, external ear and ear canal normal.   Nose: Mucosal edema and congestion present.   Mouth/Throat: Posterior oropharyngeal erythema present.       Pain over ethmoids   Eyes: Conjunctivae and EOM are normal. Pupils are equal, round, and reactive to light.   Neck: Normal range of  motion. Neck supple.   Cardiovascular: Normal rate and regular rhythm.   Pulmonary/Chest: Effort normal and breath sounds normal.   Musculoskeletal: Normal range of motion.   Lymphadenopathy:     She has cervical adenopathy (right).   Neurological: She is alert and oriented to person, place, and time.   Skin: Skin is warm and dry. Capillary refill takes less than 2 seconds.   Psychiatric: She has a normal mood and affect.   Nursing note and vitals reviewed.        Assessment/Plan   Maribel was seen today for uri.    Diagnoses and all orders for this visit:    Acute ethmoidal sinusitis, recurrence not specified    Eustachian tube dysfunction, bilateral    Other orders  -     cefdinir (OMNICEF) 300 MG capsule; Take 2 capsules by mouth Daily for 10 days.  -     mometasone (NASONEX) 50 MCG/ACT nasal spray; 2 sprays into the nostril(s) as directed by provider Daily.  -     guaiFENesin (MUCINEX) 600 MG 12 hr tablet; Take 2 tablets by mouth 2 (Two) Times a Day for 10 days. Take with glass of water        Adequate rest and hydration, warm facial packs, and steam inhalation may be useful. Saline irrigation (Neti pot) or nasal saline spray may help with clearing congestion within the sinuses. Sleep with head of bed elevated. Avoid exposure to cigarette smoke or fumes.  For worsening or persistent problems, follow up with your primary care provider.  You have voiced understanding of treatment plan, visit summary provided.     KAYLYNN Mccain

## 2019-09-03 NOTE — PATIENT INSTRUCTIONS
Sinusitis, Adult  Sinusitis is soreness and inflammation of your sinuses. Sinuses are hollow spaces in the bones around your face. Your sinuses are located:  · Around your eyes.  · In the middle of your forehead.  · Behind your nose.  · In your cheekbones.  Your sinuses and nasal passages are lined with a stringy fluid (mucus). Mucus normally drains out of your sinuses. When your nasal tissues become inflamed or swollen, mucus can become trapped or blocked. Blocked or trapped mucus makes it difficult for air to flow through your sinuses. This allows bacteria, viruses, and funguses to grow, which leads to infection. Most infections of the sinuses are caused by a virus.  Sinusitis can develop quickly. It can last for 7?10 days (acute) or for more than 12 weeks (chronic). Sinusitis often develops after a cold.  What are the causes?  This condition is caused by anything that creates swelling in the sinuses or stops mucus from draining, including:  · Allergies.  · Asthma.  · Bacterial or viral infection.  · Abnormally shaped bones between the nasal passages.  · Nasal growths that contain mucus (nasal polyps).  · Narrow sinus openings.  · Pollutants, such as chemicals or irritants in the air.  · A foreign object stuck in the nose.  · A fungal infection. This is rare.  What increases the risk?  The following factors may make you more likely to develop this condition:  · Having allergies or asthma.  · Having had a recent cold or respiratory tract infection.  · Having structural deformities or blockages in your nose or sinuses.  · Having a weak immune system.  · Doing a lot of swimming or diving.  · Overusing nasal sprays.  · Smoking.  What are the signs or symptoms?  The main symptoms of this condition are pain and a feeling of pressure around the affected sinuses. Other symptoms include:  · Upper toothache.  · Earache.  · Headache.  · Bad breath.  · Decreased sense of smell and taste.  · A cough that may get worse at  night.  · Fatigue.  · Fever.  · Thick drainage from your nose. The drainage is often green and it may contain pus (purulent).  · Stuffy nose or congestion.  · Postnasal drip. This is when extra mucus collects in the throat or back of the nose.  · Swelling and warmth over the affected sinuses.  · Sore throat.  · Sensitivity to light.  How is this diagnosed?  This condition is diagnosed based on symptoms, a medical history, and a physical exam. To find out if your condition is acute or chronic, your health care provider may:  · Look in your nose for signs of nasal polyps.  · Tap over the affected sinus to check for signs of infection.  · View the inside of your sinuses using an imaging device that has a light attached (endoscope).  If your health care provider suspects that you have chronic sinusitis, you may also:  · Be tested for allergies.  · Have a sample of mucus taken from your nose (nasal culture) and checked for bacteria.  · Have a mucus sample examined to see if your sinusitis is related to an allergy.  If your sinusitis does not respond to treatment and it lasts longer than 8 weeks, you may have an MRI or CT scan to check your sinuses. These scans also help to determine how severe your infection is.  In rare cases, a bone biopsy may be done to rule out more serious types of fungal sinus disease.  How is this treated?  Treatment for sinusitis depends on the cause and whether your condition is chronic or acute. If a virus is causing your sinusitis, your symptoms will go away on their own within 10 days. You may be given medicines to relieve your symptoms, including:  · Topical nasal decongestants. They shrink swollen nasal passages and let mucus drain from your sinuses.  · Antihistamines. These drugs block inflammation that is triggered by allergies. This can help to ease swelling in your nose and sinuses.  · Topical nasal corticosteroids. These are nasal sprays that ease inflammation and swelling in your nose  and sinuses.  · Nasal saline washes. These rinses can help to get rid of thick mucus in your nose.  If your condition is caused by bacteria, your health care provider may recommend waiting to see if your symptoms improve. Most bacterial infections will get better without antibiotic medicine. If you have a severe infection or a weak immune system, you may be prescribed antibiotics.  Surgery may be needed to correct underlying conditions, such as narrow nasal passages. Surgery may also be needed to remove polyps.  Follow these instructions at home:  Medicines  · Take, use, or apply over-the-counter and prescription medicines only as told by your health care provider. These may include nasal sprays.  · If you were prescribed an antibiotic, take it as told by your health care provider. Do not stop taking the antibiotic even if you start to feel better.  Hydrate and Humidify  · Drink enough water to keep your urine clear or pale yellow. Staying hydrated will help to thin your mucus.  · Use a cool mist humidifier to keep the humidity level in your home above 50%.  · Inhale steam for 10-15 minutes, 3-4 times a day or as told by your health care provider. You can do this in the bathroom while a hot shower is running.  · Limit your exposure to cool or dry air.  Rest  · Rest as much as possible.  · Sleep with your head raised (elevated).  · Make sure to get enough sleep each night.  General instructions  · Apply a warm, moist washcloth to your face 3-4 times a day or as told by your health care provider. This will help with discomfort.  · Wash your hands often with soap and water to reduce your exposure to viruses and other germs. If soap and water are not available, use hand .  · Do not smoke. Avoid being around people who are smoking (secondhand smoke).  · Keep all follow-up visits as told by your health care provider. This is important.  Contact a health care provider if:  · You have a fever.  · Your symptoms get  worse.  · Your symptoms do not improve within 10 days.  Get help right away if:  · You have a severe headache.  · You have persistent vomiting.  · You have pain or swelling around your face or eyes.  · You have vision problems.  · You develop confusion.  · Your neck is stiff.  · You have trouble breathing.  This information is not intended to replace advice given to you by your health care provider. Make sure you discuss any questions you have with your health care provider.  Document Released: 12/18/2006 Document Revised: 06/28/2018 Document Reviewed: 10/12/2016  [employer] Interactive Patient Education © 2019 [employer]    Eustachian Tube Dysfunction    The eustachian tube connects the middle ear to the back of the nose. It regulates air pressure in the middle ear by allowing air to move between the ear and nose. It also helps to drain fluid from the middle ear space. When the eustachian tube does not function properly, air pressure, fluid, or both can build up in the middle ear.  Eustachian tube dysfunction can affect one or both ears.  What are the causes?  This condition happens when the eustachian tube becomes blocked or cannot open normally. This may result from:  · Ear infections.  · Colds and other upper respiratory infections.  · Allergies.  · Irritation, such as from cigarette smoke or acid from the stomach coming up into the esophagus (gastroesophageal reflux).  · Sudden changes in air pressure, such as from descending in an airplane.  · Abnormal growths in the nose or throat, such as nasal polyps, tumors, or enlarged tissue at the back of the throat (adenoids).  What increases the risk?  This condition may be more likely to develop in people who smoke and people who are overweight. Eustachian tube dysfunction may also be more likely to develop in children, especially children who have:  · Certain birth defects of the mouth, such as cleft palate.  · Large tonsils and adenoids.  What are the signs or  "symptoms?  Symptoms of this condition may include:  · A feeling of fullness in the ear.  · Ear pain.  · Clicking or popping noises in the ear.  · Ringing in the ear.  · Hearing loss.  · Loss of balance.  Symptoms may get worse when the air pressure around you changes, such as when you travel to an area of high elevation or fly on an airplane.  How is this diagnosed?  This condition may be diagnosed based on:  · Your symptoms.  · A physical exam of your ear, nose, and throat.  · Tests, such as those that measure:  ? The movement of your eardrum (tympanogram).  ? Your hearing (audiometry).  How is this treated?  Treatment depends on the cause and severity of your condition. If your symptoms are mild, you may be able to relieve your symptoms by moving air into (\"popping\") your ears. If you have symptoms of fluid in your ears, treatment may include:  · Decongestants.  · Antihistamines.  · Nasal sprays or ear drops that contain medicines that reduce swelling (steroids).  In some cases, you may need to have a procedure to drain the fluid in your eardrum (myringotomy). In this procedure, a small tube is placed in the eardrum to:  · Drain the fluid.  · Restore the air in the middle ear space.  Follow these instructions at home:  · Take over-the-counter and prescription medicines only as told by your health care provider.  · Use techniques to help pop your ears as recommended by your health care provider. These may include:  ? Chewing gum.  ? Yawning.  ? Frequent, forceful swallowing.  ? Closing your mouth, holding your nose closed, and gently blowing as if you are trying to blow air out of your nose.  · Do not do any of the following until your health care provider approves:  ? Travel to high altitudes.  ? Fly in airplanes.  ? Work in a pressurized cabin or room.  ? Scuba dive.  · Keep your ears dry. Dry your ears completely after showering or bathing.  · Do not smoke.  · Keep all follow-up visits as told by your health " care provider. This is important.  Contact a health care provider if:  · Your symptoms do not go away after treatment.  · Your symptoms come back after treatment.  · You are unable to pop your ears.  · You have:  ? A fever.  ? Pain in your ear.  ? Pain in your head or neck.  ? Fluid draining from your ear.  · Your hearing suddenly changes.  · You become very dizzy.  · You lose your balance.  This information is not intended to replace advice given to you by your health care provider. Make sure you discuss any questions you have with your health care provider.  Document Released: 01/13/2017 Document Revised: 05/25/2017 Document Reviewed: 01/06/2016  [employer] Interactive Patient Education © 2019 [employer]

## 2019-10-31 ENCOUNTER — OFFICE VISIT (OUTPATIENT)
Dept: OBSTETRICS AND GYNECOLOGY | Facility: CLINIC | Age: 44
End: 2019-10-31

## 2019-10-31 VITALS
HEIGHT: 62 IN | HEART RATE: 69 BPM | OXYGEN SATURATION: 98 % | DIASTOLIC BLOOD PRESSURE: 88 MMHG | RESPIRATION RATE: 18 BRPM | BODY MASS INDEX: 43.24 KG/M2 | WEIGHT: 235 LBS | SYSTOLIC BLOOD PRESSURE: 136 MMHG

## 2019-10-31 DIAGNOSIS — N92.0 MENORRHAGIA WITH REGULAR CYCLE: ICD-10-CM

## 2019-10-31 DIAGNOSIS — N92.6 MISSED PERIOD: ICD-10-CM

## 2019-10-31 DIAGNOSIS — Z12.31 ENCOUNTER FOR SCREENING MAMMOGRAM FOR MALIGNANT NEOPLASM OF BREAST: ICD-10-CM

## 2019-10-31 DIAGNOSIS — Z01.419 WELL WOMAN EXAM WITH ROUTINE GYNECOLOGICAL EXAM: Primary | ICD-10-CM

## 2019-10-31 DIAGNOSIS — E66.01 MORBIDLY OBESE (HCC): ICD-10-CM

## 2019-10-31 DIAGNOSIS — R22.32 AXILLARY MASS, LEFT: ICD-10-CM

## 2019-10-31 LAB
B-HCG UR QL: NEGATIVE
INTERNAL NEGATIVE CONTROL: NEGATIVE
INTERNAL POSITIVE CONTROL: POSITIVE
Lab: ABNORMAL

## 2019-10-31 PROCEDURE — 81025 URINE PREGNANCY TEST: CPT | Performed by: NURSE PRACTITIONER

## 2019-10-31 PROCEDURE — 99214 OFFICE O/P EST MOD 30 MIN: CPT | Performed by: NURSE PRACTITIONER

## 2019-10-31 PROCEDURE — 87625 HPV TYPES 16 & 18 ONLY: CPT | Performed by: NURSE PRACTITIONER

## 2019-10-31 PROCEDURE — 87624 HPV HI-RISK TYP POOLED RSLT: CPT | Performed by: NURSE PRACTITIONER

## 2019-10-31 PROCEDURE — G0123 SCREEN CERV/VAG THIN LAYER: HCPCS | Performed by: NURSE PRACTITIONER

## 2019-10-31 RX ORDER — ATOMOXETINE 25 MG/1
25 CAPSULE ORAL DAILY
Refills: 3 | COMMUNITY
Start: 2019-10-01 | End: 2020-03-06

## 2019-11-01 LAB
BASOPHILS # BLD AUTO: 0.07 10*3/MM3 (ref 0–0.2)
BASOPHILS NFR BLD AUTO: 0.9 % (ref 0–1.5)
EOSINOPHIL # BLD AUTO: 0.14 10*3/MM3 (ref 0–0.4)
EOSINOPHIL NFR BLD AUTO: 1.7 % (ref 0.3–6.2)
ERYTHROCYTE [DISTWIDTH] IN BLOOD BY AUTOMATED COUNT: 15.1 % (ref 12.3–15.4)
HCT VFR BLD AUTO: 38.1 % (ref 34–46.6)
HGB BLD-MCNC: 12 G/DL (ref 12–15.9)
IMM GRANULOCYTES # BLD AUTO: 0.04 10*3/MM3 (ref 0–0.05)
IMM GRANULOCYTES NFR BLD AUTO: 0.5 % (ref 0–0.5)
LYMPHOCYTES # BLD AUTO: 2.37 10*3/MM3 (ref 0.7–3.1)
LYMPHOCYTES NFR BLD AUTO: 28.9 % (ref 19.6–45.3)
MCH RBC QN AUTO: 24.4 PG (ref 26.6–33)
MCHC RBC AUTO-ENTMCNC: 31.5 G/DL (ref 31.5–35.7)
MCV RBC AUTO: 77.4 FL (ref 79–97)
MONOCYTES # BLD AUTO: 0.56 10*3/MM3 (ref 0.1–0.9)
MONOCYTES NFR BLD AUTO: 6.8 % (ref 5–12)
NEUTROPHILS # BLD AUTO: 5.03 10*3/MM3 (ref 1.7–7)
NEUTROPHILS NFR BLD AUTO: 61.2 % (ref 42.7–76)
NRBC BLD AUTO-RTO: 0 /100 WBC (ref 0–0.2)
PLATELET # BLD AUTO: 300 10*3/MM3 (ref 140–450)
RBC # BLD AUTO: 4.92 10*6/MM3 (ref 3.77–5.28)
WBC # BLD AUTO: 8.21 10*3/MM3 (ref 3.4–10.8)

## 2019-11-01 NOTE — PROGRESS NOTES
Tell Maribel that her blood count is fine, she is no anemic and tell her to keep her followup appointment with Dr. Ornelas for her US and office visit.  KAYLYNN Wylie

## 2019-11-01 NOTE — PROGRESS NOTES
HGB normal,  Patient will RTO and see Dr. Ornelas for US and possible EBX as she is interested in an ablation and TL.  KAYLYNN Wylie

## 2019-11-04 LAB
GEN CATEG CVX/VAG CYTO-IMP: NORMAL
HPV I/H RISK 4 DNA CVX QL PROBE+SIG AMP: DETECTED
HPV16 DNA SPEC QL NAA+PROBE: NOT DETECTED
HPV18+45 E6+E7 MRNA CVX QL NAA+PROBE: NOT DETECTED
LAB AP CASE REPORT: NORMAL
LAB AP GYN ADDITIONAL INFORMATION: NORMAL
LAB AP GYN OTHER FINDINGS: NORMAL
PATH INTERP SPEC-IMP: NORMAL
STAT OF ADQ CVX/VAG CYTO-IMP: NORMAL

## 2019-11-11 PROBLEM — E66.01 MORBIDLY OBESE (HCC): Status: ACTIVE | Noted: 2019-11-11

## 2019-11-19 DIAGNOSIS — B96.89 BV (BACTERIAL VAGINOSIS): Primary | ICD-10-CM

## 2019-11-19 DIAGNOSIS — N76.0 BV (BACTERIAL VAGINOSIS): Primary | ICD-10-CM

## 2019-11-19 RX ORDER — METRONIDAZOLE 7.5 MG/G
GEL VAGINAL
Qty: 1 TUBE | Refills: 1 | Status: SHIPPED | OUTPATIENT
Start: 2019-11-19 | End: 2019-12-30

## 2019-11-19 NOTE — PROGRESS NOTES
Tell Maribel that her pap shows no signs of cancer.   The HPV was positive but negative for the types of HPV that can cause cervical cancer.  It did show a slight bacterial infection for which I sent in a medication. KAYLYNN Wylie

## 2019-11-20 ENCOUNTER — HOSPITAL ENCOUNTER (OUTPATIENT)
Dept: MAMMOGRAPHY | Facility: HOSPITAL | Age: 44
Discharge: HOME | End: 2019-11-20
Admitting: NURSE PRACTITIONER

## 2019-11-20 ENCOUNTER — HOSPITAL ENCOUNTER (OUTPATIENT)
Dept: ULTRASOUND IMAGING | Facility: HOSPITAL | Age: 44
Discharge: HOME | End: 2019-11-20

## 2019-11-20 DIAGNOSIS — Z12.31 ENCOUNTER FOR SCREENING MAMMOGRAM FOR MALIGNANT NEOPLASM OF BREAST: ICD-10-CM

## 2019-11-20 DIAGNOSIS — R22.32 AXILLARY MASS, LEFT: ICD-10-CM

## 2019-11-20 PROCEDURE — 76882 US LMTD JT/FCL EVL NVASC XTR: CPT

## 2019-11-20 PROCEDURE — 77067 SCR MAMMO BI INCL CAD: CPT

## 2019-11-20 PROCEDURE — 77063 BREAST TOMOSYNTHESIS BI: CPT

## 2019-12-30 ENCOUNTER — PROCEDURE VISIT (OUTPATIENT)
Dept: OBSTETRICS AND GYNECOLOGY | Facility: CLINIC | Age: 44
End: 2019-12-30

## 2019-12-30 VITALS
BODY MASS INDEX: 43.24 KG/M2 | DIASTOLIC BLOOD PRESSURE: 72 MMHG | HEIGHT: 62 IN | SYSTOLIC BLOOD PRESSURE: 134 MMHG | WEIGHT: 235 LBS

## 2019-12-30 DIAGNOSIS — N92.6 IRREGULAR MENSES: ICD-10-CM

## 2019-12-30 DIAGNOSIS — N92.4 EXCESSIVE BLEEDING IN PREMENOPAUSAL PERIOD: Primary | ICD-10-CM

## 2019-12-30 DIAGNOSIS — N92.0 MENORRHAGIA WITH REGULAR CYCLE: Primary | ICD-10-CM

## 2019-12-30 LAB
B-HCG UR QL: NEGATIVE
INTERNAL NEGATIVE CONTROL: NEGATIVE
INTERNAL POSITIVE CONTROL: POSITIVE
Lab: NORMAL

## 2019-12-30 PROCEDURE — 81025 URINE PREGNANCY TEST: CPT | Performed by: OBSTETRICS & GYNECOLOGY

## 2019-12-30 PROCEDURE — 76830 TRANSVAGINAL US NON-OB: CPT | Performed by: OBSTETRICS & GYNECOLOGY

## 2019-12-30 PROCEDURE — 99213 OFFICE O/P EST LOW 20 MIN: CPT | Performed by: OBSTETRICS & GYNECOLOGY

## 2019-12-30 RX ORDER — FLUTICASONE PROPIONATE 50 MCG
SPRAY, SUSPENSION (ML) NASAL
COMMUNITY
End: 2020-03-06

## 2019-12-31 LAB
ALBUMIN SERPL-MCNC: 4.5 G/DL (ref 3.5–5.2)
ALBUMIN/GLOB SERPL: 2 G/DL
ALP SERPL-CCNC: 108 U/L (ref 39–117)
ALT SERPL-CCNC: 15 U/L (ref 1–33)
AST SERPL-CCNC: 20 U/L (ref 1–32)
BILIRUB SERPL-MCNC: 0.3 MG/DL (ref 0.2–1.2)
BUN SERPL-MCNC: 11 MG/DL (ref 6–20)
BUN/CREAT SERPL: 13.1 (ref 7–25)
CALCIUM SERPL-MCNC: 9.2 MG/DL (ref 8.6–10.5)
CHLORIDE SERPL-SCNC: 96 MMOL/L (ref 98–107)
CO2 SERPL-SCNC: 28.2 MMOL/L (ref 22–29)
CREAT SERPL-MCNC: 0.84 MG/DL (ref 0.57–1)
GLOBULIN SER CALC-MCNC: 2.3 GM/DL
GLUCOSE SERPL-MCNC: 90 MG/DL (ref 65–99)
HBA1C MFR BLD: 5.8 % (ref 4.8–5.6)
INSULIN SERPL-ACNC: 17.3 UIU/ML (ref 2.6–24.9)
POTASSIUM SERPL-SCNC: 3.8 MMOL/L (ref 3.5–5.2)
PROT SERPL-MCNC: 6.8 G/DL (ref 6–8.5)
SODIUM SERPL-SCNC: 137 MMOL/L (ref 136–145)
TESTOST SERPL-MCNC: 5 NG/DL (ref 8–48)
TSH SERPL DL<=0.005 MIU/L-ACNC: 2.09 UIU/ML (ref 0.27–4.2)

## 2020-01-15 ENCOUNTER — OFFICE VISIT (OUTPATIENT)
Dept: OBSTETRICS AND GYNECOLOGY | Facility: CLINIC | Age: 45
End: 2020-01-15

## 2020-01-15 VITALS
WEIGHT: 233 LBS | SYSTOLIC BLOOD PRESSURE: 132 MMHG | DIASTOLIC BLOOD PRESSURE: 70 MMHG | BODY MASS INDEX: 42.88 KG/M2 | HEIGHT: 62 IN

## 2020-01-15 DIAGNOSIS — N92.0 MENORRHAGIA WITH REGULAR CYCLE: Primary | ICD-10-CM

## 2020-01-15 PROCEDURE — 88305 TISSUE EXAM BY PATHOLOGIST: CPT | Performed by: OBSTETRICS & GYNECOLOGY

## 2020-01-15 PROCEDURE — 81025 URINE PREGNANCY TEST: CPT | Performed by: OBSTETRICS & GYNECOLOGY

## 2020-01-15 PROCEDURE — 58100 BIOPSY OF UTERUS LINING: CPT | Performed by: OBSTETRICS & GYNECOLOGY

## 2020-01-15 RX ORDER — METFORMIN HYDROCHLORIDE 500 MG/1
500 TABLET, EXTENDED RELEASE ORAL
Qty: 30 TABLET | Refills: 2 | Status: SHIPPED | OUTPATIENT
Start: 2020-01-15 | End: 2020-04-01

## 2020-01-15 NOTE — PROGRESS NOTES
Endometrial Biopsy Procedure Note    Pre-operative Diagnosis: menorrhagia    Post-operative Diagnosis: same    Indications: abnormal uterine bleeding    Procedure Details    Urine pregnancy test was done and result was negative.  The risks (including infection, bleeding, pain, and uterine perforation) and benefits of the procedure were explained to the patient and Verbal informed consent was obtained.  Antibiotic prophylaxis against endocarditis was not indicated.     The patient was placed in the dorsal lithotomy position.  Bimanual exam showed the uterus to be in the neutral position.  A Graves' speculum inserted in the vagina, and the cervix prepped with povidone iodine.  Endocervical curettage with a Kevorkian curette was not performed.     A sharp tenaculum was applied to the anterior lip of the cervix for stabilization.  A sterile uterine sound was used to sound the uterus to a depth of 8cm.  An endometrial pipelle  was used to sample the endometrium.  Sample was sent for pathologic examination.    Condition:  Stable    Complications:  None  Patient tolerated the procedure well without complications.    Plan:    The patient was advised to call for any fever or for prolonged or severe pain or bleeding. She was advised to use OTC ibuprofen as needed for mild to moderate pain. She was advised to avoid vaginal intercourse for 48 hours or until the bleeding has completely stopped. Patient also started on Metformin at this time due to elevated fasting insulin levels and elevated hemoglobin a1c. Risk, benefits and common side effects discussed with patient. Patient instructed to take Metformin in am with breakfast.

## 2020-01-17 LAB
LAB AP CASE REPORT: NORMAL
LAB AP CLINICAL INFORMATION: NORMAL
PATH REPORT.FINAL DX SPEC: NORMAL
PATH REPORT.GROSS SPEC: NORMAL

## 2020-03-06 ENCOUNTER — OFFICE VISIT (OUTPATIENT)
Dept: OTOLARYNGOLOGY | Facility: CLINIC | Age: 45
End: 2020-03-06

## 2020-03-06 ENCOUNTER — PROCEDURE VISIT (OUTPATIENT)
Dept: OTOLARYNGOLOGY | Facility: CLINIC | Age: 45
End: 2020-03-06

## 2020-03-06 VITALS
WEIGHT: 236.8 LBS | SYSTOLIC BLOOD PRESSURE: 126 MMHG | DIASTOLIC BLOOD PRESSURE: 79 MMHG | BODY MASS INDEX: 43.58 KG/M2 | HEIGHT: 62 IN | HEART RATE: 73 BPM | TEMPERATURE: 97.2 F

## 2020-03-06 DIAGNOSIS — H90.5 SNHL (SENSORY-NEURAL HEARING LOSS), UNILATERAL: Primary | ICD-10-CM

## 2020-03-06 DIAGNOSIS — R42 VERTIGO: Primary | ICD-10-CM

## 2020-03-06 DIAGNOSIS — H91.91 UNILATERAL HEARING LOSS, RIGHT: ICD-10-CM

## 2020-03-06 PROCEDURE — 99214 OFFICE O/P EST MOD 30 MIN: CPT | Performed by: PHYSICIAN ASSISTANT

## 2020-03-06 NOTE — PATIENT INSTRUCTIONS
Will get MRI and recheck in 6 weeks.    MENIERE’S DISEASE    Description:  Meniere’s disease is a disorder of the inner ear.  The disease appears to be related to a failure of the mechanism regulating the production, circulation, and/or absorption of endolymph.  The cause of Meniere’s disease is unknown.  Proposed theories of cochlear dysfunction in patients with Meniere’s disease include mechanical, metabolic, and/or biochemical alterations.  Clinically, the classic symptoms of Meniere’s disease are recurrent episodes of vertigo, fluctuating sensorineural hearing loss, and roaring tinnitus.    Histopathologic observations on temporal bones from individuals with Meniere’s disease have consistently shown distention of the endolymphatic system.  Dilatation of the membranous labyrinth may be accompanied by herniations, ruptures, fistulas, and/or total collapse.  The organ of Corti  appears normal in some specimens and abnormal in others (Kimura et al., 1976).  In general, morphologic changes in the organ of Corti have been difficult to ascribe exclusively to Meniere’s disease because similar changes may be found in non-Meniere’s specimens, such as individuals with presbyacusis (Kimura et al., 1976).    Patient Characteristics:  The initial onset of symptoms is between 40 and 60 years of age in about 50% of individuals.  The disease is rate in children.  The hearing loss is unilateral in approximately 80% of individuals.  A hereditary predisposition for Meniere’s disease may be indicated by occasional occurrences of episodic vertigo and hearing loss in families (Hull, 1965).  A sex ration for Meniere’s disease is disputed.    Clinical Course:  The onset of Meniere’s disease is typically characterized by a sudden episode of vertigo accompanied by nausea and vomiting.  The onset of vertigo may be preceded by feelings of pressure or fullness in the ear.  Briefly before or during the vertiginous episode, a loud roaring  or ringing tinnitus may become apparent.  Hearing sensitivity in the affected ear may decrease.  However, information about hearing sensitivity during the vertiginous episode may be difficult to obtain because the symptoms of vertigo and loud, roaring tinnitus may be relatively more dramatic.    Following the acute episode, the vertigo may cease or subside into a sensation of unsteadiness.  Feelings of unsteadiness may last for a period of days.  The attack usually involves no neurologic symptoms other than those referable to the end organ.  At the end of the attack, hearing sensitivity may improve.  Tinnitus may lessen.    The initial attack of vertigo is characteristically followed by a period of remission.  During this time, the patient may notice fluctuation in his hearing ability, in the tinnitus, and in the feelings of pressure or fullness within the ear.  Subsequently, the above symptoms may be interrupted or exacerbated by another episode of spontaneous vertigo.  The characteristic frequency of vertiginous attacks is variable among patients.      Gradually, a longer period of spontaneous or treatment-induced remission occurs.  During remission, no symptoms may be noted other than a loss of hearing in the involved ear. The clinical course is subsequently characterized by active periods of variable length interspersed with remission periods of variable length.  Over time, the symptoms of vertigo, nausea, and vomiting tend to become less severe.  In contrast, the hearing loss and tinnitus may become progressively worse (Leslie and Guerita, 1976).    Treatment may involve drug therapy, diet modification, and/or surgery in patients with incapacitating and unsuccessfully treated vertiginous attacks.    Site of Disorder:      [place].    General Audiologic Pattern:  Pure tone sensitivity results characteristically show a unilateral, fluctuating sensorineural hearing loss.  During the initial sates of Meniere’s  disease, the degree of loss may vary.  However, over a period of years, the degree of loss usually progresses to a moderate to severe, permanent impairment.  On rare occasions, a total loss of hearing may occur.    The audiometric configuration may vary with the time course of the disease.  In the initial stages of Meniere’s disease, the audiometric contour may be rising, with greater loss in the low frequency region than in the mid and high frequency regions.   Subsequently, the audiometric configuration may change to a relatively flat contour with approximately the same degree of sensitivity loss at all frequencies.  During a later stage of Meniere’s disease, the audiogram may present a downwardly sloping configuration with greater loss in the high frequency region than in the low frequency region.    Maximum speech intelligibility scores are usually reduced in a manner consistent with the degree and configuration of sensitivity loss.    Impedance results characteristically show normal, type A, tympanograms and normal static compliance measures.  Acoustic reflexes are characteristically present at normal HLs and reduced sensation levels.  No reflex decay is observed at 500 Hz or 1000 Hz.    In some patients with Meniere’s disease, the loudness discomfort level or the intensity level where sounds are first reported as annoying may occur at a reduced hearing level relative to the normal ear.  Patients may report intolerance to loud sounds.  Consequently, auditory tests at loud intensity levels may not e obtainable.  In addition, patients may complain of diplacusis or a difference of pitch sensation in the involved ear relative to the normal ear.

## 2020-03-06 NOTE — PROGRESS NOTES
ANASTACIO Marcial     Chief Complaint   Patient presents with   • Hearing Problem        HISTORY OF PRESENT ILLNESS:     Maribel Whitaker is a  44 y.o.  female who complains of vertigo and hearing loss. The symptoms are localized to the right ear. The patient has had moderate symptoms. The symptoms have been relatively constant for the last several months. The symptoms are aggravated by  no identifiable factors. The symptoms are improved by no identifiable factors.          Review of Systems   Constitutional: Negative for activity change, appetite change, chills, diaphoresis, fatigue, fever and unexpected weight change.   HENT: Positive for hearing loss. Negative for congestion, dental problem, drooling, ear discharge, ear pain, facial swelling, mouth sores, nosebleeds, postnasal drip, rhinorrhea, sinus pressure, sneezing, sore throat, tinnitus, trouble swallowing and voice change.    Eyes: Negative.    Respiratory: Negative.    Cardiovascular: Negative.    Gastrointestinal: Negative.    Endocrine: Negative.    Skin: Negative.    Allergic/Immunologic: Negative for environmental allergies, food allergies and immunocompromised state.   Neurological: Positive for dizziness.   Hematological: Negative.    Psychiatric/Behavioral: Negative.    :    Past History:  Past Medical History:   Diagnosis Date   • Acid reflux    • ADHD (attention deficit hyperactivity disorder)    • Allergic    • Anxiety    • Arthritis    • Depression    • Diabetes (CMS/HCC)    • Fibromyalgia    • Hypertension    • Migraines      Past Surgical History:   Procedure Laterality Date   •  SECTION     • CHOLECYSTECTOMY     • GASTRIC SLEEVE LAPAROSCOPIC       Family History   Problem Relation Age of Onset   • Cancer Mother    • Diabetes Mother    • Obesity Mother    • Heart disease Father    • Diabetes Father    • Obesity Father    • Breast cancer Maternal Aunt 65   • Ovarian cancer Neg Hx    • Uterine cancer Neg Hx    • Colon cancer Neg  Hx      Social History     Tobacco Use   • Smoking status: Never Smoker   • Smokeless tobacco: Never Used   Substance Use Topics   • Alcohol use: No   • Drug use: No     Outpatient Medications Marked as Taking for the 3/6/20 encounter (Office Visit) with Michelet García PA   Medication Sig Dispense Refill   • ALPRAZolam (XANAX) 1 MG tablet 1 mg Daily.     • baclofen (LIORESAL) 10 MG tablet 10 mg 2 (Two) Times a Day.     • DULoxetine (CYMBALTA) 60 MG capsule Take 60 mg by mouth Daily.  3   • gabapentin (NEURONTIN) 300 MG capsule 300 mg 3 (Three) Times a Day.     • lamoTRIgine (LaMICtal) 150 MG tablet Take 150 mg by mouth 2 (Two) Times a Day.     • lisinopril (PRINIVIL,ZESTRIL) 20 MG tablet Take 20 mg by mouth Daily.     • metFORMIN ER (GLUCOPHAGE-XR) 500 MG 24 hr tablet Take 1 tablet by mouth Daily With Breakfast. 30 tablet 2   • mometasone (NASONEX) 50 MCG/ACT nasal spray 2 sprays into the nostril(s) as directed by provider Daily. 17 g 12   • pantoprazole (PROTONIX) 40 MG EC tablet pantoprazole 40 mg tablet,delayed release     • traMADol (ULTRAM) 50 MG tablet tramadol 50 mg tablet   1 tab TID     • traZODone (DESYREL) 150 MG tablet        Allergies:  Patient has no known allergies.          Vital Signs:   Vitals:    03/06/20 0956   BP: 126/79   Pulse: 73   Temp: 97.2 °F (36.2 °C)         EXAMINATION:   CONSTITUTIONAL: well nourished, alert, oriented, in no acute distress     COMMUNICATION AND VOICE: able to communicate normally, normal voice quality    HEAD: normocephalic, no lesions, atraumatic, no tenderness, no masses     FACE: appearance normal, no lesions, no tenderness, no deformities, facial motion symmetric    SALIVARY GLANDS: parotid glands with no tenderness, no swelling, no masses, submandibular glands with normal size, nontender    EYES: ocular motility normal, eyelids normal, orbits normal, no proptosis, conjunctiva normal , pupils equal, round     EARS:  Hearing: response to conversational voice  decreased on the right and normal on the left   External Ears: auricles without lesions  Otoscopic: tympanic membrane appearance normal, no lesions, no perforation, normal mobility, no fluid    NOSE:  External Nose: structure normal, no tenderness on palpation, no nasal discharge, no lesions, no evidence of trauma, nostrils patent   Intranasal Exam: nasal mucosa normal, vestibule within normal limits, inferior turbinate normal, nasal septum midline     ORAL:  Lips: upper and lower lips without lesion   Teeth: dentition within normal limits for age   Gums: gingivae healthy   Oral Mucosa: oral mucosa normal, no mucosal lesions   Floor of Mouth: Warthin’s duct patent, mucosa normal  Tongue: lingual mucosa normal without lesions, normal tongue mobility   Palate: soft and hard palates with normal mucosa and structure  Oropharynx: oropharyngeal mucosa normal    NECK: neck appearance normal, no mass,  noted without erythema or tenderness    THYROID: no overt thyromegaly, no tenderness, nodules or mass present on palpation, position midline     LYMPH NODES: no lymphadenopathy    CHEST/RESPIRATORY: respiratory effort normal, normal breath sounds     CARDIOVASCULAR: rate and rhythm normal, extremities without cyanosis or edema      NEUROLOGIC/PSYCHIATRIC: oriented to time, place and person, mood normal, affect appropriate, CN II-XII intact grossly    RESULTS REVIEW:    I have reviewed the patients old records in the chart.  Audiologic testing reviewed.       Assessment    Diagnosis Plan   1. Vertigo  MRI Brain With & Without Contrast    MRI internal auditory canal w wo   2. Unilateral hearing loss, right  MRI Brain With & Without Contrast    MRI internal auditory canal w wo       Plan    Patient Instructions   Will get MRI and recheck in 6 weeks.    MENIERE’S DISEASE    Description:  Meniere’s disease is a disorder of the inner ear.  The disease appears to be related to a failure of the mechanism regulating the production,  circulation, and/or absorption of endolymph.  The cause of Meniere’s disease is unknown.  Proposed theories of cochlear dysfunction in patients with Meniere’s disease include mechanical, metabolic, and/or biochemical alterations.  Clinically, the classic symptoms of Meniere’s disease are recurrent episodes of vertigo, fluctuating sensorineural hearing loss, and roaring tinnitus.    Histopathologic observations on temporal bones from individuals with Meniere’s disease have consistently shown distention of the endolymphatic system.  Dilatation of the membranous labyrinth may be accompanied by herniations, ruptures, fistulas, and/or total collapse.  The organ of Corti  appears normal in some specimens and abnormal in others (Kimura et al., 1976).  In general, morphologic changes in the organ of Corti have been difficult to ascribe exclusively to Meniere’s disease because similar changes may be found in non-Meniere’s specimens, such as individuals with presbyacusis (Kimura et al., 1976).    Patient Characteristics:  The initial onset of symptoms is between 40 and 60 years of age in about 50% of individuals.  The disease is rate in children.  The hearing loss is unilateral in approximately 80% of individuals.  A hereditary predisposition for Meniere’s disease may be indicated by occasional occurrences of episodic vertigo and hearing loss in families (Hull, 1965).  A sex ration for Meniere’s disease is disputed.    Clinical Course:  The onset of Meniere’s disease is typically characterized by a sudden episode of vertigo accompanied by nausea and vomiting.  The onset of vertigo may be preceded by feelings of pressure or fullness in the ear.  Briefly before or during the vertiginous episode, a loud roaring or ringing tinnitus may become apparent.  Hearing sensitivity in the affected ear may decrease.  However, information about hearing sensitivity during the vertiginous episode may be difficult to obtain because the  symptoms of vertigo and loud, roaring tinnitus may be relatively more dramatic.    Following the acute episode, the vertigo may cease or subside into a sensation of unsteadiness.  Feelings of unsteadiness may last for a period of days.  The attack usually involves no neurologic symptoms other than those referable to the end organ.  At the end of the attack, hearing sensitivity may improve.  Tinnitus may lessen.    The initial attack of vertigo is characteristically followed by a period of remission.  During this time, the patient may notice fluctuation in his hearing ability, in the tinnitus, and in the feelings of pressure or fullness within the ear.  Subsequently, the above symptoms may be interrupted or exacerbated by another episode of spontaneous vertigo.  The characteristic frequency of vertiginous attacks is variable among patients.      Gradually, a longer period of spontaneous or treatment-induced remission occurs.  During remission, no symptoms may be noted other than a loss of hearing in the involved ear. The clinical course is subsequently characterized by active periods of variable length interspersed with remission periods of variable length.  Over time, the symptoms of vertigo, nausea, and vomiting tend to become less severe.  In contrast, the hearing loss and tinnitus may become progressively worse (Leslie and Guerita, 1976).    Treatment may involve drug therapy, diet modification, and/or surgery in patients with incapacitating and unsuccessfully treated vertiginous attacks.    Site of Disorder:      [place].    General Audiologic Pattern:  Pure tone sensitivity results characteristically show a unilateral, fluctuating sensorineural hearing loss.  During the initial sates of Meniere’s disease, the degree of loss may vary.  However, over a period of years, the degree of loss usually progresses to a moderate to severe, permanent impairment.  On rare occasions, a total loss of hearing may  occur.    The audiometric configuration may vary with the time course of the disease.  In the initial stages of Meniere’s disease, the audiometric contour may be rising, with greater loss in the low frequency region than in the mid and high frequency regions.   Subsequently, the audiometric configuration may change to a relatively flat contour with approximately the same degree of sensitivity loss at all frequencies.  During a later stage of Meniere’s disease, the audiogram may present a downwardly sloping configuration with greater loss in the high frequency region than in the low frequency region.    Maximum speech intelligibility scores are usually reduced in a manner consistent with the degree and configuration of sensitivity loss.    Impedance results characteristically show normal, type A, tympanograms and normal static compliance measures.  Acoustic reflexes are characteristically present at normal HLs and reduced sensation levels.  No reflex decay is observed at 500 Hz or 1000 Hz.    In some patients with Meniere’s disease, the loudness discomfort level or the intensity level where sounds are first reported as annoying may occur at a reduced hearing level relative to the normal ear.  Patients may report intolerance to loud sounds.  Consequently, auditory tests at loud intensity levels may not e obtainable.  In addition, patients may complain of diplacusis or a difference of pitch sensation in the involved ear relative to the normal ear.                Orders Placed This Encounter   Procedures   • MRI Brain With & Without Contrast   • MRI internal auditory canal w wo          Return in about 6 weeks (around 4/17/2020) for Recheck ears after MRI.    ANASTACIO Marcial  03/16/20  13:49

## 2020-03-10 ENCOUNTER — HOSPITAL ENCOUNTER (OUTPATIENT)
Dept: MRI IMAGING | Facility: HOSPITAL | Age: 45
Discharge: HOME | End: 2020-03-10

## 2020-03-10 ENCOUNTER — HOSPITAL ENCOUNTER (OUTPATIENT)
Dept: MRI IMAGING | Facility: HOSPITAL | Age: 45
Discharge: HOME | End: 2020-03-10
Admitting: PHYSICIAN ASSISTANT

## 2020-03-10 DIAGNOSIS — R42 VERTIGO: ICD-10-CM

## 2020-03-10 DIAGNOSIS — H91.91 UNILATERAL HEARING LOSS, RIGHT: ICD-10-CM

## 2020-03-10 LAB — CREAT BLDA-MCNC: 0.7 MG/DL (ref 0.6–1.3)

## 2020-03-10 PROCEDURE — A9577 INJ MULTIHANCE: HCPCS | Performed by: PHYSICIAN ASSISTANT

## 2020-03-10 PROCEDURE — 70553 MRI BRAIN STEM W/O & W/DYE: CPT

## 2020-03-10 PROCEDURE — 82565 ASSAY OF CREATININE: CPT

## 2020-03-10 PROCEDURE — 0 GADOBENATE DIMEGLUMINE 529 MG/ML SOLUTION: Performed by: PHYSICIAN ASSISTANT

## 2020-03-10 RX ADMIN — GADOBENATE DIMEGLUMINE 20 ML: 529 INJECTION, SOLUTION INTRAVENOUS at 16:55

## 2020-03-11 ENCOUNTER — TELEPHONE (OUTPATIENT)
Dept: OTOLARYNGOLOGY | Facility: CLINIC | Age: 45
End: 2020-03-11

## 2020-03-11 NOTE — TELEPHONE ENCOUNTER
----- Message from ANASTACIO Marcial sent at 3/11/2020 10:36 AM CDT -----  Please call the patient regarding her normal result.

## 2020-03-12 ENCOUNTER — TELEPHONE (OUTPATIENT)
Dept: OTOLARYNGOLOGY | Facility: CLINIC | Age: 45
End: 2020-03-12

## 2020-03-16 PROBLEM — R42 VERTIGO: Status: ACTIVE | Noted: 2020-03-16

## 2020-03-16 PROBLEM — H91.91 UNILATERAL HEARING LOSS, RIGHT: Status: ACTIVE | Noted: 2020-03-16

## 2020-04-01 RX ORDER — METFORMIN HYDROCHLORIDE 500 MG/1
TABLET, EXTENDED RELEASE ORAL
Qty: 30 TABLET | Refills: 2 | Status: SHIPPED | OUTPATIENT
Start: 2020-04-01 | End: 2020-06-23

## 2020-04-15 ENCOUNTER — OFFICE VISIT (OUTPATIENT)
Dept: OTOLARYNGOLOGY | Facility: CLINIC | Age: 45
End: 2020-04-15

## 2020-04-15 DIAGNOSIS — H81.01 MENIERE DISEASE, RIGHT: ICD-10-CM

## 2020-04-15 DIAGNOSIS — H93.11 TINNITUS OF RIGHT EAR: ICD-10-CM

## 2020-04-15 DIAGNOSIS — H91.91 UNILATERAL HEARING LOSS, RIGHT: Primary | ICD-10-CM

## 2020-04-15 PROCEDURE — 99421 OL DIG E/M SVC 5-10 MIN: CPT | Performed by: PHYSICIAN ASSISTANT

## 2020-04-15 NOTE — PROGRESS NOTES
ANASTACIO Marcial     FOLLOW UP TELEPHONE VISIT     Chief Complaint   Patient presents with   • Dizziness        HPI   You have chosen to receive care through a telephone visit. Do you consent to use a telephone visit for your medical care today? Yes    Maribel Whitaker is a 44 y.o. female who presents for a telephone follow up visit. The visit from a scheduled appointment was initiated by the patient by telephone. The advantages and limitations of telephone visits were discussed and understood by the patient. She has had continued problems with vertigo and hearing loss. The symptoms are localized to the right ear. The symptoms severity was described as: stable The symptoms have been: relatively constant for the last several months The symptoms are aggravated by  no identifiable factors. The symptoms are improved by no identifiable factors.     The patient reports stable symptoms with stable hearing and mild vertigo with a constant roaring sound in the right ear.         Study Result     EXAMINATION:  MRI BRAIN W WO CONTRAST-, MRI INTERNAL AUDITORY CANAL W  WO-  3/10/2020 3:49 PM CDT     HISTORY: right sided hearing loss and vertigo; R42-Dizziness and  giddiness; H91.91-Unspecified hearing loss, right ear      COMPARISON: No comparison study.     TECHNIQUE: MR imaging of the brain and dedicated imaging of the internal  auditory canals were performed. Gadolinium enhanced imaging obtained.     FINDINGS:      There are no cerebellopontine angle masses. There is no abnormal  gadolinium enhancement associated with the 7th and 8th cranial nerves.  Particularly there is no evidence of acoustic neuroma.     The semicircular canals and cochlea are imaged symmetrically.     No MR abnormality appreciated in the IACs.     There is no diffusion signal abnormality to indicate an acute ischemic  event/area of infarction.     There is no abnormal T1 or T2 signal changes observed in the brain.     There is no mass effect or  midline shift.     There is no hydrocephalus.     There is no abnormal gadolinium enhancement.     The globes and intraorbital structures are imaged symmetrically.     Pituitary gland is not enlarged.     Cervical spine imaged in part is unremarkable.     There is no abnormal intra-axial extra-axial hemorrhage.     Normal flow voids and intravascular gadolinium enhancement observed     Mastoid and paranasal sinuses are clear without inflammatory change.           IMPRESSION:  1. Negative MR imaging of the brain and IACs.  This report was finalized on 03/10/2020 17:24 by Dr. Amado Hardy MD.       Review of Systems   Constitutional: Negative for activity change, appetite change, chills, diaphoresis, fatigue, fever and unexpected weight change.   HENT: Positive for hearing loss and tinnitus. Negative for congestion, dental problem, drooling, ear discharge, ear pain, facial swelling, mouth sores, nosebleeds, postnasal drip, rhinorrhea, sinus pressure, sneezing, sore throat, trouble swallowing and voice change.    Eyes: Negative.    Respiratory: Negative.    Cardiovascular: Negative.    Gastrointestinal: Negative.    Endocrine: Negative.    Skin: Negative.    Allergic/Immunologic: Negative for environmental allergies, food allergies and immunocompromised state.   Neurological: Positive for dizziness.   Hematological: Negative.    Psychiatric/Behavioral: Negative.         Past History:   Past medical and surgical history, family history and social history reviewed and updated when appropriate.  Current medications and allergies reviewed and updated when appropriate.  Allergies:  Patient has no known allergies.          Physical Exam (limited to audible responses)  CONSTITUTIONAL: alert, oriented, in no audible distress  COMMUNICATION AND VOICE: able to communicate normally, normal voice quality   {HEARING: response to conversational voice normal on the phone  CHEST/RESPIRATORY: no audible work of breathing, speaking in  complete sentences, no audible wheezing or stridor  NEUROLOGIC/PSYCHIATRIC: oriented appropriately for age, mood normal, affect appropriate,     RESULTS REVIEW:    I have reviewed the patients old records in the chart.        Assessment/Plan    Assessment:   1. Unilateral hearing loss, right    2. Meniere disease, right        Plan:      Continue treatment as directed, if symptoms develop call for sooner appointment, otherwise follow-up in one year with audiogram.    If vertigo worsens call for Robinul, Zofran, and a Medrol pack.    Discussed dietary changes to include a low salt diet, avoiding processed foods, red wine, caffeine, and chocolate.    The patient understands and agrees with assessment and plan      MENIERE’S DISEASE    Description:  Meniere’s disease is a disorder of the inner ear.  The disease appears to be related to a failure of the mechanism regulating the production, circulation, and/or absorption of endolymph.  The cause of Meniere’s disease is unknown.  Proposed theories of cochlear dysfunction in patients with Meniere’s disease include mechanical, metabolic, and/or biochemical alterations.  Clinically, the classic symptoms of Meniere’s disease are recurrent episodes of vertigo, fluctuating sensorineural hearing loss, and roaring tinnitus.    Histopathologic observations on temporal bones from individuals with Meniere’s disease have consistently shown distention of the endolymphatic system.  Dilatation of the membranous labyrinth may be accompanied by herniations, ruptures, fistulas, and/or total collapse.  The organ of Corti  appears normal in some specimens and abnormal in others (Kimura et al., 1976).  In general, morphologic changes in the organ of Corti have been difficult to ascribe exclusively to Meniere’s disease because similar changes may be found in non-Meniere’s specimens, such as individuals with presbyacusis (Kimura et al., 1976).    Patient Characteristics:  The initial onset of  symptoms is between 40 and 60 years of age in about 50% of individuals.  The disease is rate in children.  The hearing loss is unilateral in approximately 80% of individuals.  A hereditary predisposition for Meniere’s disease may be indicated by occasional occurrences of episodic vertigo and hearing loss in families (Hull, 1965).  A sex ration for Meniere’s disease is disputed.    Clinical Course:  The onset of Meniere’s disease is typically characterized by a sudden episode of vertigo accompanied by nausea and vomiting.  The onset of vertigo may be preceded by feelings of pressure or fullness in the ear.  Briefly before or during the vertiginous episode, a loud roaring or ringing tinnitus may become apparent.  Hearing sensitivity in the affected ear may decrease.  However, information about hearing sensitivity during the vertiginous episode may be difficult to obtain because the symptoms of vertigo and loud, roaring tinnitus may be relatively more dramatic.    Following the acute episode, the vertigo may cease or subside into a sensation of unsteadiness.  Feelings of unsteadiness may last for a period of days.  The attack usually involves no neurologic symptoms other than those referable to the end organ.  At the end of the attack, hearing sensitivity may improve.  Tinnitus may lessen.    The initial attack of vertigo is characteristically followed by a period of remission.  During this time, the patient may notice fluctuation in his hearing ability, in the tinnitus, and in the feelings of pressure or fullness within the ear.  Subsequently, the above symptoms may be interrupted or exacerbated by another episode of spontaneous vertigo.  The characteristic frequency of vertiginous attacks is variable among patients.      Gradually, a longer period of spontaneous or treatment-induced remission occurs.  During remission, no symptoms may be noted other than a loss of hearing in the involved ear. The clinical course  is subsequently characterized by active periods of variable length interspersed with remission periods of variable length.  Over time, the symptoms of vertigo, nausea, and vomiting tend to become less severe.  In contrast, the hearing loss and tinnitus may become progressively worse (Leslie and Guerita, 1976).    Treatment may involve drug therapy, diet modification, and/or surgery in patients with incapacitating and unsuccessfully treated vertiginous attacks.    Site of Disorder:      [place].    General Audiologic Pattern:  Pure tone sensitivity results characteristically show a unilateral, fluctuating sensorineural hearing loss.  During the initial sates of Meniere’s disease, the degree of loss may vary.  However, over a period of years, the degree of loss usually progresses to a moderate to severe, permanent impairment.  On rare occasions, a total loss of hearing may occur.    The audiometric configuration may vary with the time course of the disease.  In the initial stages of Meniere’s disease, the audiometric contour may be rising, with greater loss in the low frequency region than in the mid and high frequency regions.   Subsequently, the audiometric configuration may change to a relatively flat contour with approximately the same degree of sensitivity loss at all frequencies.  During a later stage of Meniere’s disease, the audiogram may present a downwardly sloping configuration with greater loss in the high frequency region than in the low frequency region.    Maximum speech intelligibility scores are usually reduced in a manner consistent with the degree and configuration of sensitivity loss.    Impedance results characteristically show normal, type A, tympanograms and normal static compliance measures.  Acoustic reflexes are characteristically present at normal HLs and reduced sensation levels.  No reflex decay is observed at 500 Hz or 1000 Hz.    In some patients with Meniere’s disease, the loudness  discomfort level or the intensity level where sounds are first reported as annoying may occur at a reduced hearing level relative to the normal ear.  Patients may report intolerance to loud sounds.  Consequently, auditory tests at loud intensity levels may not e obtainable.  In addition, patients may complain of diplacusis or a difference of pitch sensation in the involved ear relative to the normal ear.         500 Milligrams or 22 Mill equivalent Sodium Diet    All foods on this diet should be cooked and served without the addition of salt or other sodium compounds.    This diet meets the 1989 Recommended Daily Dietary Allowance when the types of food and amounts suggested are included daily:    1. 2 or more cups of milk or substitute for adults  2. 2 or more servings of meat, fish, poultry, eggs, or cheese  3. 4 or more servings of bread and/or cereal (whole grain or  enriched)  4. 4 or more servings of vegetables and fruits (include citrus daily and a dark green or deep yellow vegetable every other day)    Foods  Allowed     Not allowed    Beverages Milk, 2 cups only, including that used in   Buttermilk, milk shakes, malted milk, commercial   cooking.  Whole, 2% skim, evaporated  eggnog. Instant cocoa mixes, carbonated beverages,    milk, reconstituted dried milk.  Other  prepared beverage mixes, including fruit flavoring    beverages as desired.  Cocoa, coffee  powder, Iranian process cocoa, fountain beverages.    substitutes, decaffeinated coffee, tea,     some carbonated (consult your dietician.)    Bread  Low sodium break, unsalted crackers,  All bread or bakery goods prepared with baking    unsalted kevon toast.    powder, baking soda, or salt, salted crackers, pretzels,          self-rising flour, prepared mixes, self-rising meal.    Cereals  Cooked cereal prepared without salt,  Cereals containing more than 25mg sodium.    dry or instant cereals containing 25mg of    sodium or less. (Ask dietician for list  of    brand names).    Desserts  As desired. Fruits, gelatin desserts made  Blackstrap molasses.    with low sodium gelatin powder, unsalted    custard and pudding made from milk and    egg allowance, fruit plea made with unsalted    crust, marshmallows, hard candy, jelly beans.    For other desserts, ask dietitian for brand names.    Egg  One only, prepared any way without salt.    Fat  As desired. Unsalted butter, unsalted margarine, Salted butter, salted margarine, joshua, pork, ham    hydrogenated fat, vegetable oil, low sodium  hock, salted commercial salad dressing or mayonnaise.    Dietetic salad dressing, cream (limit 2tbs.)        Fruit and All fresh, frozen or canned fruit unless listed  Canned, dried, or frozen fruit, which have salt or sodium  Fruit Juices under “Not Allowed”.    benzoate added, crystallized or glazed fruit, maraschino          cherries, tomato, V-8 or other vegetable juices unless low          sodium.                    Meat, Fish, 4 ounces only, prepared any way without  Canned, salted or smoked meat (joshua, chipped or  Poultry,Cheese salt, beef, lamb, pork, veal, chicken, turkey,  corned beef, frankfurters, ham, kosher meats, luncheon    fresh or unsalted frozen fish, low sodium  meats, salt pork, sausage, smoked tongue, most frozen    tuna or salmon, unsalted cottage cheese,  fish,(anchovies, salt cod, herring, sardines, etc.) canned    5 tsp peanut butter may be substituted for  tuna or salmon unless low sodium, shellfish (clams,    one ounce of meat.    Crab, lobster, scallops, shrimp) kidney, cheese except          unsalted cottage cheese and low sodium cheese.    Potato or Prepared any way without salt. White or  Potato chips or other snack chips, instant potatoes,  Potato Sub sweet potatoes, yams, macaroni, noodles,  convenience potatoes, convenience rice or noodle    spaghetti, rice     products    Soup  As desired. Salt-free broth or cream   Soup made with salt or sodium  compounds, canned    soup made with milk allowance and  soup unless low bouillon cubes, dried soup.    allowed vegetable, low sodium canned    soup.    Vegetables Vegetables prepared any way without  Fresh, frozen or canned artichokes, beets, best greens,    salt.  All fresh frozen or low sodium  carrots, celery, hominy, kale, mustard greens, mixed    canned vegetables unless listed under  vegetables, parsley, sauerkraut, spinach, turnips (white).    “Not allowed”.     frozen lima beans, peas, dried split peas.  All regular          canned vegetables.  All tomato juice or vegetable juice.    Miscellaneous As desired.  All spices, basil, bay leaves,  Salt, baking soda, baking powder, celery seed, celery salt,    chili powder, cinnamon, cloves, cocoa,  onion salt, mixed seasoned salt, mono-sodium glutamate,    huber, coconut, dill, garlic, garlic powder  catsup, chili sauce, barbecue sauce, prepared mustard, joshua    katja, mace, dry mustard powder, oregano  bits, prepared horseradish, soy sauce, olives, Worcestershire    paprika, pepper, poultry seasoning, lyndsey,  sauce, steak sauce, Kitchen Bouquet, meat extracts, gravies,  thyme, turmeric, katherine, poppy and sesame tenderizers, pickles, relish, salted nuts, cooking ivan     seeds, lemon juice, vinegar, flavoring extracts, containing salt, seasoned cooking mixes.    cream of tartar, yeast, unsalted nuts, unsalted    popcorn.            Orders Placed This Encounter   Procedures   • Comprehensive Hearing Test   • Tympanometry       Return in about 1 year (around 4/15/2021) for Recheck ears/vertigo with audiogram.        This visit has been rescheduled as a phone visit to comply with patient safety concerns in accordance with CDC recommendations. Total time of discussion was 7 minutes.        ANASTACIO Marcial  04/15/20  09:21

## 2020-04-15 NOTE — PATIENT INSTRUCTIONS
Continue treatment as directed, if symptoms develop call for sooner appointment, otherwise follow-up in one year with audiogram.    If vertigo worsens call for Robinul, Zofran, and a Medrol pack.    Discussed dietary changes to include a low salt diet, avoiding processed foods, red wine, caffeine, and chocolate.    The patient understands and agrees with assessment and plan.      MENIERE’S DISEASE    Description:  Meniere’s disease is a disorder of the inner ear.  The disease appears to be related to a failure of the mechanism regulating the production, circulation, and/or absorption of endolymph.  The cause of Meniere’s disease is unknown.  Proposed theories of cochlear dysfunction in patients with Meniere’s disease include mechanical, metabolic, and/or biochemical alterations.  Clinically, the classic symptoms of Meniere’s disease are recurrent episodes of vertigo, fluctuating sensorineural hearing loss, and roaring tinnitus.    Histopathologic observations on temporal bones from individuals with Meniere’s disease have consistently shown distention of the endolymphatic system.  Dilatation of the membranous labyrinth may be accompanied by herniations, ruptures, fistulas, and/or total collapse.  The organ of Corti  appears normal in some specimens and abnormal in others (Kimura et al., 1976).  In general, morphologic changes in the organ of Corti have been difficult to ascribe exclusively to Meniere’s disease because similar changes may be found in non-Meniere’s specimens, such as individuals with presbyacusis (Kimura et al., 1976).    Patient Characteristics:  The initial onset of symptoms is between 40 and 60 years of age in about 50% of individuals.  The disease is rate in children.  The hearing loss is unilateral in approximately 80% of individuals.  A hereditary predisposition for Meniere’s disease may be indicated by occasional occurrences of episodic vertigo and hearing loss in families (Hull, 1965).  A  sex ration for Meniere’s disease is disputed.    Clinical Course:  The onset of Meniere’s disease is typically characterized by a sudden episode of vertigo accompanied by nausea and vomiting.  The onset of vertigo may be preceded by feelings of pressure or fullness in the ear.  Briefly before or during the vertiginous episode, a loud roaring or ringing tinnitus may become apparent.  Hearing sensitivity in the affected ear may decrease.  However, information about hearing sensitivity during the vertiginous episode may be difficult to obtain because the symptoms of vertigo and loud, roaring tinnitus may be relatively more dramatic.    Following the acute episode, the vertigo may cease or subside into a sensation of unsteadiness.  Feelings of unsteadiness may last for a period of days.  The attack usually involves no neurologic symptoms other than those referable to the end organ.  At the end of the attack, hearing sensitivity may improve.  Tinnitus may lessen.    The initial attack of vertigo is characteristically followed by a period of remission.  During this time, the patient may notice fluctuation in his hearing ability, in the tinnitus, and in the feelings of pressure or fullness within the ear.  Subsequently, the above symptoms may be interrupted or exacerbated by another episode of spontaneous vertigo.  The characteristic frequency of vertiginous attacks is variable among patients.      Gradually, a longer period of spontaneous or treatment-induced remission occurs.  During remission, no symptoms may be noted other than a loss of hearing in the involved ear. The clinical course is subsequently characterized by active periods of variable length interspersed with remission periods of variable length.  Over time, the symptoms of vertigo, nausea, and vomiting tend to become less severe.  In contrast, the hearing loss and tinnitus may become progressively worse (Leslie and Guerita, 1976).    Treatment may involve  drug therapy, diet modification, and/or surgery in patients with incapacitating and unsuccessfully treated vertiginous attacks.    Site of Disorder:      [place].    General Audiologic Pattern:  Pure tone sensitivity results characteristically show a unilateral, fluctuating sensorineural hearing loss.  During the initial sates of Meniere’s disease, the degree of loss may vary.  However, over a period of years, the degree of loss usually progresses to a moderate to severe, permanent impairment.  On rare occasions, a total loss of hearing may occur.    The audiometric configuration may vary with the time course of the disease.  In the initial stages of Meniere’s disease, the audiometric contour may be rising, with greater loss in the low frequency region than in the mid and high frequency regions.   Subsequently, the audiometric configuration may change to a relatively flat contour with approximately the same degree of sensitivity loss at all frequencies.  During a later stage of Meniere’s disease, the audiogram may present a downwardly sloping configuration with greater loss in the high frequency region than in the low frequency region.    Maximum speech intelligibility scores are usually reduced in a manner consistent with the degree and configuration of sensitivity loss.    Impedance results characteristically show normal, type A, tympanograms and normal static compliance measures.  Acoustic reflexes are characteristically present at normal HLs and reduced sensation levels.  No reflex decay is observed at 500 Hz or 1000 Hz.    In some patients with Meniere’s disease, the loudness discomfort level or the intensity level where sounds are first reported as annoying may occur at a reduced hearing level relative to the normal ear.  Patients may report intolerance to loud sounds.  Consequently, auditory tests at loud intensity levels may not e obtainable.  In addition, patients may complain of diplacusis or a difference  of pitch sensation in the involved ear relative to the normal ear.         500 Milligrams or 22 Mill equivalent Sodium Diet    All foods on this diet should be cooked and served without the addition of salt or other sodium compounds.    This diet meets the 1989 Recommended Daily Dietary Allowance when the types of food and amounts suggested are included daily:    1. 2 or more cups of milk or substitute for adults  2. 2 or more servings of meat, fish, poultry, eggs, or cheese  3. 4 or more servings of bread and/or cereal (whole grain or  enriched)  4. 4 or more servings of vegetables and fruits (include citrus daily and a dark green or deep yellow vegetable every other day)    Foods  Allowed     Not allowed    Beverages Milk, 2 cups only, including that used in   Buttermilk, milk shakes, malted milk, commercial   cooking.  Whole, 2% skim, evaporated  eggnog. Instant cocoa mixes, carbonated beverages,    milk, reconstituted dried milk.  Other  prepared beverage mixes, including fruit flavoring    beverages as desired.  Cocoa, coffee  powder, Tunisian process cocoa, fountain beverages.    substitutes, decaffeinated coffee, tea,     some carbonated (consult your dietician.)    Bread  Low sodium break, unsalted crackers,  All bread or bakery goods prepared with baking    unsalted kevon toast.    powder, baking soda, or salt, salted crackers, pretzels,          self-rising flour, prepared mixes, self-rising meal.    Cereals  Cooked cereal prepared without salt,  Cereals containing more than 25mg sodium.    dry or instant cereals containing 25mg of    sodium or less. (Ask dietician for list of    brand names).    Desserts  As desired. Fruits, gelatin desserts made  Blackstrap molasses.    with low sodium gelatin powder, unsalted    custard and pudding made from milk and    egg allowance, fruit plea made with unsalted    crust, marshmallows, hard candy, jelly beans.    For other desserts, ask dietitian for brand  names.    Egg  One only, prepared any way without salt.    Fat  As desired. Unsalted butter, unsalted margarine, Salted butter, salted margarine, joshua, pork, ham    hydrogenated fat, vegetable oil, low sodium  hock, salted commercial salad dressing or mayonnaise.    Dietetic salad dressing, cream (limit 2tbs.)        Fruit and All fresh, frozen or canned fruit unless listed  Canned, dried, or frozen fruit, which have salt or sodium  Fruit Juices under “Not Allowed”.    benzoate added, crystallized or glazed fruit, maraschino          cherries, tomato, V-8 or other vegetable juices unless low          sodium.                    Meat, Fish, 4 ounces only, prepared any way without  Canned, salted or smoked meat (joshua, chipped or  Poultry,Cheese salt, beef, lamb, pork, veal, chicken, turkey,  corned beef, frankfurters, ham, kosher meats, luncheon    fresh or unsalted frozen fish, low sodium  meats, salt pork, sausage, smoked tongue, most frozen    tuna or salmon, unsalted cottage cheese,  fish,(anchovies, salt cod, herring, sardines, etc.) canned    5 tsp peanut butter may be substituted for  tuna or salmon unless low sodium, shellfish (clams,    one ounce of meat.    Crab, lobster, scallops, shrimp) kidney, cheese except          unsalted cottage cheese and low sodium cheese.    Potato or Prepared any way without salt. White or  Potato chips or other snack chips, instant potatoes,  Potato Sub sweet potatoes, yams, macaroni, noodles,  convenience potatoes, convenience rice or noodle    spaghetti, rice     products    Soup  As desired. Salt-free broth or cream   Soup made with salt or sodium compounds, canned    soup made with milk allowance and  soup unless low bouillon cubes, dried soup.    allowed vegetable, low sodium canned    soup.    Vegetables Vegetables prepared any way without  Fresh, frozen or canned artichokes, beets, best greens,    salt.  All fresh frozen or low sodium  carrots, celery, hominy, kale,  mustard greens, mixed    canned vegetables unless listed under  vegetables, parsley, sauerkraut, spinach, turnips (white).    “Not allowed”.     frozen lima beans, peas, dried split peas.  All regular          canned vegetables.  All tomato juice or vegetable juice.    Miscellaneous As desired.  All spices, basil, bay leaves,  Salt, baking soda, baking powder, celery seed, celery salt,    chili powder, cinnamon, cloves, cocoa,  onion salt, mixed seasoned salt, mono-sodium glutamate,    huber, coconut, dill, garlic, garlic powder  catsup, chili sauce, barbecue sauce, prepared mustard, joshua    katja, mace, dry mustard powder, oregano  bits, prepared horseradish, soy sauce, olives, Worcestershire    paprika, pepper, poultry seasoning, lyndsey,  sauce, steak sauce, Kitchen Bouquet, meat extracts, gravies,  thyme, turmeric, katherine, poppy and sesame tenderizers, pickles, relish, salted nuts, cooking ivan     seeds, lemon juice, vinegar, flavoring extracts, containing salt, seasoned cooking mixes.    cream of tartar, yeast, unsalted nuts, unsalted    popcorn.

## 2020-05-26 ENCOUNTER — TELEPHONE (OUTPATIENT)
Dept: OTOLARYNGOLOGY | Facility: CLINIC | Age: 45
End: 2020-05-26

## 2020-05-26 RX ORDER — ONDANSETRON 4 MG/1
4 TABLET, ORALLY DISINTEGRATING ORAL EVERY 8 HOURS PRN
Qty: 8 TABLET | Refills: 0 | Status: SHIPPED | OUTPATIENT
Start: 2020-05-26 | End: 2021-04-28

## 2020-05-26 RX ORDER — METHYLPREDNISOLONE 4 MG/1
TABLET ORAL
Qty: 1 EACH | Refills: 0 | Status: SHIPPED | OUTPATIENT
Start: 2020-05-26 | End: 2020-06-01

## 2020-05-26 NOTE — TELEPHONE ENCOUNTER
----- Message from ANASTACIO Marcial sent at 5/26/2020  5:53 PM CDT -----  Regarding: RE:   Zofran 4mg  ----- Message -----  From: Kamille Lopez RN  Sent: 5/26/2020   5:48 PM CDT  To: ANASTACIO Marcial    Dizziness making her nauseated. Will you give her something for this

## 2020-06-23 RX ORDER — METFORMIN HYDROCHLORIDE 500 MG/1
TABLET, EXTENDED RELEASE ORAL
Qty: 30 TABLET | Refills: 2 | Status: SHIPPED | OUTPATIENT
Start: 2020-06-23 | End: 2020-09-21

## 2020-07-01 ENCOUNTER — OFFICE VISIT (OUTPATIENT)
Dept: OBSTETRICS AND GYNECOLOGY | Facility: CLINIC | Age: 45
End: 2020-07-01

## 2020-07-01 VITALS
WEIGHT: 236 LBS | DIASTOLIC BLOOD PRESSURE: 66 MMHG | HEIGHT: 62 IN | SYSTOLIC BLOOD PRESSURE: 118 MMHG | BODY MASS INDEX: 43.43 KG/M2

## 2020-07-01 DIAGNOSIS — N76.0 ACUTE VAGINITIS: Primary | ICD-10-CM

## 2020-07-01 PROCEDURE — 87798 DETECT AGENT NOS DNA AMP: CPT | Performed by: OBSTETRICS & GYNECOLOGY

## 2020-07-01 PROCEDURE — 87512 GARDNER VAG DNA QUANT: CPT | Performed by: OBSTETRICS & GYNECOLOGY

## 2020-07-01 PROCEDURE — 87491 CHLMYD TRACH DNA AMP PROBE: CPT | Performed by: OBSTETRICS & GYNECOLOGY

## 2020-07-01 PROCEDURE — 87563 M. GENITALIUM AMP PROBE: CPT | Performed by: OBSTETRICS & GYNECOLOGY

## 2020-07-01 PROCEDURE — 87481 CANDIDA DNA AMP PROBE: CPT | Performed by: OBSTETRICS & GYNECOLOGY

## 2020-07-01 PROCEDURE — 87661 TRICHOMONAS VAGINALIS AMPLIF: CPT | Performed by: OBSTETRICS & GYNECOLOGY

## 2020-07-01 PROCEDURE — 81025 URINE PREGNANCY TEST: CPT | Performed by: OBSTETRICS & GYNECOLOGY

## 2020-07-01 PROCEDURE — 99213 OFFICE O/P EST LOW 20 MIN: CPT | Performed by: OBSTETRICS & GYNECOLOGY

## 2020-07-01 PROCEDURE — 87591 N.GONORRHOEAE DNA AMP PROB: CPT | Performed by: OBSTETRICS & GYNECOLOGY

## 2020-07-01 RX ORDER — BLOOD SUGAR DIAGNOSTIC
STRIP MISCELLANEOUS 2 TIMES DAILY
COMMUNITY
Start: 2020-06-01 | End: 2020-10-13 | Stop reason: SDUPTHER

## 2020-07-01 RX ORDER — BLOOD-GLUCOSE METER
EACH MISCELLANEOUS 2 TIMES DAILY
COMMUNITY
Start: 2020-04-06 | End: 2021-05-07

## 2020-07-01 RX ORDER — LANCETS 33 GAUGE
EACH MISCELLANEOUS 2 TIMES DAILY
COMMUNITY
Start: 2020-06-04

## 2020-07-01 NOTE — PROGRESS NOTES
"Subjective   Maribel Whitaker is a 44 y.o. female.     Chief Complaint   Patient presents with   • Menstrual Problem     PT is here for follow up from her endo bx  PT states last month she has a light cycle and did not have one in        44 year old female  Patient's last menstrual period was 05/10/2020 (approximate) presents for follow up on menorrhagia. Patient said since her last visit she did not have a cycle in .  She does report that she had a light cycle in May.  At this point she is not interested in further management for her menorrhagia.  Does report that she has noticed vaginal discharge for approximately the last 8 months.    Vaginal Discharge   The patient's primary symptoms include vaginal discharge. The current episode started more than 1 month ago. The problem has been unchanged. The vaginal discharge was clear and malodorous. Nothing aggravates the symptoms. She has tried nothing for the symptoms.     Review of Systems   Genitourinary: Positive for amenorrhea and vaginal discharge.     Objective   /66   Ht 157.5 cm (62\")   Wt 107 kg (236 lb)   LMP 05/10/2020 (Approximate)   BMI 43.16 kg/m²   Patient's last menstrual period was 05/10/2020 (approximate).  Physical Exam   Constitutional: She appears well-developed and well-nourished. No distress.   HENT:   Head: Normocephalic and atraumatic.   Pulmonary/Chest: Effort normal.   Abdominal: Soft. There is no tenderness.   Genitourinary: Vagina normal and cervix normal. Pelvic exam was performed with patient supine. There is no tenderness or lesion on the right labia. There is no tenderness or lesion on the left labia. Uterus is not enlarged or tender. Cervix does not exhibit motion tenderness, discharge or friability. Right adnexum displays no tenderness and no fullness. Left adnexum displays no tenderness and no fullness. No tenderness or bleeding in the vagina. No signs of injury around the vagina. No vaginal discharge found. "   Nursing note and vitals reviewed.    Assessment/Plan   Problems Addressed this Visit     None      Visit Diagnoses     Acute vaginitis    -  Primary    Relevant Orders    Gynecologic Fluid, Supplemental Testing    POC Pregnancy, Urine (Completed)      -Cultures sent with reuslts to follow   -Will have patient reutrn to clinic for annual examination in three months. She needs a pap smear at that time due to negative pap with positive HPV.   -All questions answered and patient verbalized understanding.        Lissett Ornelas, DO

## 2020-07-06 LAB
C TRACH RRNA CVX QL NAA+PROBE: NOT DETECTED
N GONORRHOEA RRNA SPEC QL NAA+PROBE: NOT DETECTED
TRICHOMONAS VAGINALIS PCR: NOT DETECTED

## 2020-07-09 ENCOUNTER — TELEPHONE (OUTPATIENT)
Dept: OBSTETRICS AND GYNECOLOGY | Facility: CLINIC | Age: 45
End: 2020-07-09

## 2020-07-09 LAB
LAB AP CASE REPORT: NORMAL
Lab: NORMAL
PATH INTERP SPEC-IMP: NORMAL
STAT OF ADQ CVX/VAG CYTO-IMP: NORMAL

## 2020-07-09 RX ORDER — METRONIDAZOLE 7.5 MG/G
GEL VAGINAL NIGHTLY
Qty: 70 G | Refills: 0 | Status: SHIPPED | OUTPATIENT
Start: 2020-07-09 | End: 2020-07-14

## 2020-07-09 NOTE — TELEPHONE ENCOUNTER
----- Message from Lissett Ornelas DO sent at 7/9/2020  7:19 AM CDT -----  Please let the patient know that her BV panel showed gardnerella. Please send the patient metrogel

## 2020-09-21 RX ORDER — METFORMIN HYDROCHLORIDE 500 MG/1
TABLET, EXTENDED RELEASE ORAL
Qty: 30 TABLET | Refills: 2 | Status: SHIPPED | OUTPATIENT
Start: 2020-09-21 | End: 2021-01-06 | Stop reason: SDUPTHER

## 2020-10-08 ENCOUNTER — TRANSCRIBE ORDERS (OUTPATIENT)
Dept: ADMINISTRATIVE | Facility: HOSPITAL | Age: 45
End: 2020-10-08

## 2020-10-08 DIAGNOSIS — Z01.818 PREOP TESTING: Primary | ICD-10-CM

## 2020-10-10 ENCOUNTER — LAB (OUTPATIENT)
Dept: LAB | Facility: HOSPITAL | Age: 45
End: 2020-10-10

## 2020-10-10 PROCEDURE — C9803 HOPD COVID-19 SPEC COLLECT: HCPCS

## 2020-10-10 PROCEDURE — C9803 HOPD COVID-19 SPEC COLLECT: HCPCS | Performed by: PAIN MEDICINE

## 2020-10-10 PROCEDURE — U0003 INFECTIOUS AGENT DETECTION BY NUCLEIC ACID (DNA OR RNA); SEVERE ACUTE RESPIRATORY SYNDROME CORONAVIRUS 2 (SARS-COV-2) (CORONAVIRUS DISEASE [COVID-19]), AMPLIFIED PROBE TECHNIQUE, MAKING USE OF HIGH THROUGHPUT TECHNOLOGIES AS DESCRIBED BY CMS-2020-01-R: HCPCS | Performed by: PAIN MEDICINE

## 2020-10-11 LAB
COVID LABCORP PRIORITY: NORMAL
SARS-COV-2 RNA RESP QL NAA+PROBE: NOT DETECTED

## 2020-10-13 ENCOUNTER — OFFICE VISIT (OUTPATIENT)
Dept: OBSTETRICS AND GYNECOLOGY | Facility: CLINIC | Age: 45
End: 2020-10-13

## 2020-10-13 VITALS
BODY MASS INDEX: 43.06 KG/M2 | HEIGHT: 62 IN | SYSTOLIC BLOOD PRESSURE: 124 MMHG | DIASTOLIC BLOOD PRESSURE: 80 MMHG | WEIGHT: 234 LBS

## 2020-10-13 DIAGNOSIS — N76.0 BACTERIAL VAGINOSIS: ICD-10-CM

## 2020-10-13 DIAGNOSIS — Z00.00 ENCOUNTER FOR ANNUAL HEALTH EXAMINATION: Primary | ICD-10-CM

## 2020-10-13 DIAGNOSIS — B96.89 BACTERIAL VAGINOSIS: ICD-10-CM

## 2020-10-13 DIAGNOSIS — Z12.31 SCREENING MAMMOGRAM, ENCOUNTER FOR: ICD-10-CM

## 2020-10-13 DIAGNOSIS — Z12.4 SCREENING FOR CERVICAL CANCER: ICD-10-CM

## 2020-10-13 PROCEDURE — 87624 HPV HI-RISK TYP POOLED RSLT: CPT | Performed by: OBSTETRICS & GYNECOLOGY

## 2020-10-13 PROCEDURE — 87481 CANDIDA DNA AMP PROBE: CPT | Performed by: OBSTETRICS & GYNECOLOGY

## 2020-10-13 PROCEDURE — 87661 TRICHOMONAS VAGINALIS AMPLIF: CPT | Performed by: OBSTETRICS & GYNECOLOGY

## 2020-10-13 PROCEDURE — G0123 SCREEN CERV/VAG THIN LAYER: HCPCS | Performed by: OBSTETRICS & GYNECOLOGY

## 2020-10-13 PROCEDURE — 87798 DETECT AGENT NOS DNA AMP: CPT | Performed by: OBSTETRICS & GYNECOLOGY

## 2020-10-13 PROCEDURE — 87563 M. GENITALIUM AMP PROBE: CPT | Performed by: OBSTETRICS & GYNECOLOGY

## 2020-10-13 PROCEDURE — 87512 GARDNER VAG DNA QUANT: CPT | Performed by: OBSTETRICS & GYNECOLOGY

## 2020-10-13 PROCEDURE — 99396 PREV VISIT EST AGE 40-64: CPT | Performed by: OBSTETRICS & GYNECOLOGY

## 2020-10-13 NOTE — PROGRESS NOTES
Subjective   Maribel Whitaker is a 44 y.o. female  YOB: 1975        Chief Complaint   Patient presents with   • Gynecologic Exam     Pt is here for annual exam PT feels like she may still having the bacterial infection        44 year old female  LMP 2 weeks ago presents for annual examination. Patient reports her cycles are more manageable at this time. She reports occasional hot flashes. Her last PAP smear was negative but was positive for HPV.She is sexually active with one partner at this time without condoms at this time. She denies drinking, smoking or drug use at this time. She does reports a family history of breast cancer in her maternal aunt. She also reports that she has had four bacterial vaginosis infections in the past year.      No Known Allergies    Past Medical History:   Diagnosis Date   • Acid reflux    • ADHD (attention deficit hyperactivity disorder)    • Allergic    • Anxiety    • Arthritis    • Depression    • Diabetes (CMS/HCC)    • Fibromyalgia    • Hypertension    • Migraines        Family History   Problem Relation Age of Onset   • Cancer Mother    • Diabetes Mother    • Obesity Mother    • Heart disease Father    • Diabetes Father    • Obesity Father    • Breast cancer Maternal Aunt 65   • Ovarian cancer Neg Hx    • Uterine cancer Neg Hx    • Colon cancer Neg Hx        Social History     Socioeconomic History   • Marital status:      Spouse name: Not on file   • Number of children: Not on file   • Years of education: Not on file   • Highest education level: Not on file   Tobacco Use   • Smoking status: Never Smoker   • Smokeless tobacco: Never Used   Substance and Sexual Activity   • Alcohol use: No   • Drug use: No   • Sexual activity: Yes     Partners: Male     Birth control/protection: None         Current Outpatient Medications:   •  ALPRAZolam (XANAX) 1 MG tablet, 1 mg Daily., Disp: , Rfl:   •  baclofen (LIORESAL) 10 MG tablet, 10 mg 2 (Two) Times a Day.,  Disp: , Rfl:   •  Blood Glucose Monitoring Suppl (ONE TOUCH ULTRA 2) w/Device kit, 2 (Two) Times a Day., Disp: , Rfl:   •  [employer] Lancets Micro Thin 33G misc, 2 (Two) Times a Day., Disp: , Rfl:   •  DULoxetine (CYMBALTA) 60 MG capsule, Take 60 mg by mouth Daily., Disp: , Rfl: 3  •  gabapentin (NEURONTIN) 300 MG capsule, 300 mg 3 (Three) Times a Day., Disp: , Rfl:   •  lamoTRIgine (LaMICtal) 150 MG tablet, Take 150 mg by mouth 2 (Two) Times a Day., Disp: , Rfl:   •  lisinopril (PRINIVIL,ZESTRIL) 20 MG tablet, Take 20 mg by mouth Daily., Disp: , Rfl:   •  metFORMIN ER (GLUCOPHAGE-XR) 500 MG 24 hr tablet, TAKE 1 TABLET BY MOUTH EVERY DAY WITH BREAKFAST, Disp: 30 tablet, Rfl: 2  •  mometasone (NASONEX) 50 MCG/ACT nasal spray, 2 sprays into the nostril(s) as directed by provider Daily., Disp: 17 g, Rfl: 12  •  ondansetron ODT (Zofran ODT) 4 MG disintegrating tablet, Place 1 tablet on the tongue Every 8 (Eight) Hours As Needed for Nausea or Vomiting., Disp: 8 tablet, Rfl: 0  •  pantoprazole (PROTONIX) 40 MG EC tablet, pantoprazole 40 mg tablet,delayed release, Disp: , Rfl:   •  traMADol (ULTRAM) 50 MG tablet, tramadol 50 mg tablet  1 tab TID, Disp: , Rfl:   •  traZODone (DESYREL) 150 MG tablet, , Disp: , Rfl:     Patient's last menstrual period was 2020 (approximate).    Sexual History:         Could not be calculated    Past Surgical History:   Procedure Laterality Date   •  SECTION     • CHOLECYSTECTOMY     • GASTRIC SLEEVE LAPAROSCOPIC         Review of Systems   Constitutional: Negative for activity change and unexpected weight loss.   HENT: Negative for congestion.    Cardiovascular: Negative for chest pain.   Gastrointestinal: Positive for constipation (stool softener). Negative for blood in stool and diarrhea.   Endocrine: Negative for cold intolerance and heat intolerance.   Genitourinary: Negative for dyspareunia, pelvic pain and vaginal discharge.   Musculoskeletal: Positive for back pain and neck  "pain (goes to pain management ). Negative for arthralgias and neck stiffness.   Skin: Negative for rash.   Neurological: Positive for dizziness and headache.   Psychiatric/Behavioral: Positive for decreased concentration and depressed mood. Negative for self-injury, sleep disturbance and suicidal ideas. The patient is not nervous/anxious.        Objective   Physical Exam  Vitals signs and nursing note reviewed.   Constitutional:       General: She is not in acute distress.     Appearance: She is well-developed.   HENT:      Head: Normocephalic and atraumatic.   Neck:      Musculoskeletal: Normal range of motion and neck supple.   Cardiovascular:      Rate and Rhythm: Normal rate and regular rhythm.      Heart sounds: No murmur.   Pulmonary:      Effort: Pulmonary effort is normal.      Breath sounds: Normal breath sounds.   Chest:      Breasts:         Right: No inverted nipple or mass.         Left: No inverted nipple or mass.   Abdominal:      General: There is no distension.      Palpations: Abdomen is soft.      Tenderness: There is no abdominal tenderness.   Genitourinary:     Exam position: Supine.      Labia:         Right: No tenderness or lesion.         Left: No tenderness or lesion.       Vagina: Normal. No vaginal discharge, tenderness or bleeding.      Cervix: No cervical motion tenderness, discharge or friability.      Adnexa:         Right: No tenderness or fullness.          Left: No tenderness or fullness.     Musculoskeletal: Normal range of motion.   Skin:     General: Skin is warm and dry.   Neurological:      Mental Status: She is alert and oriented to person, place, and time.   Psychiatric:         Behavior: Behavior normal.         Judgment: Judgment normal.           Vitals:    10/13/20 1019   BP: 124/80   Weight: 106 kg (234 lb)   Height: 157.5 cm (62\")       Diagnoses and all orders for this visit:    1. Encounter for annual health examination (Primary)  -     Hemoglobin A1c  -     " Comprehensive Metabolic Panel    2. Screening for cervical cancer  -     Liquid-based Pap Smear, Screening    3. Bacterial vaginosis    -PAP smear collected today with results to follow. Will treat patient for recurrent BV if positive.   -Order for mammogram placed   -Will recheck hemoglobin a1c at this time.   -Discussed referral for nutrition. Patient reports that she following up with her gastric bypass surgeon.   -All questions answered and patient verbalized understanding of plan.     Lissett Ornelas, DO

## 2020-10-14 LAB
ALBUMIN SERPL-MCNC: 4.4 G/DL (ref 3.5–5.2)
ALBUMIN/GLOB SERPL: 2 G/DL
ALP SERPL-CCNC: 102 U/L (ref 39–117)
ALT SERPL-CCNC: 14 U/L (ref 1–33)
AST SERPL-CCNC: 17 U/L (ref 1–32)
BILIRUB SERPL-MCNC: 0.2 MG/DL (ref 0–1.2)
BUN SERPL-MCNC: 16 MG/DL (ref 6–20)
BUN/CREAT SERPL: 21.6 (ref 7–25)
CALCIUM SERPL-MCNC: 9.2 MG/DL (ref 8.6–10.5)
CHLORIDE SERPL-SCNC: 101 MMOL/L (ref 98–107)
CO2 SERPL-SCNC: 29.4 MMOL/L (ref 22–29)
CREAT SERPL-MCNC: 0.74 MG/DL (ref 0.57–1)
GLOBULIN SER CALC-MCNC: 2.2 GM/DL
GLUCOSE SERPL-MCNC: 82 MG/DL (ref 65–99)
HBA1C MFR BLD: 5.6 % (ref 4.8–5.6)
HPV I/H RISK 4 DNA CVX QL PROBE+SIG AMP: NOT DETECTED
POTASSIUM SERPL-SCNC: 4.4 MMOL/L (ref 3.5–5.2)
PROT SERPL-MCNC: 6.6 G/DL (ref 6–8.5)
SODIUM SERPL-SCNC: 140 MMOL/L (ref 136–145)
TRICHOMONAS VAGINALIS PCR: NOT DETECTED

## 2020-10-16 LAB
GEN CATEG CVX/VAG CYTO-IMP: NORMAL
LAB AP CASE REPORT: NORMAL
LAB AP GYN ADDITIONAL INFORMATION: NORMAL
LAB AP GYN OTHER FINDINGS: NORMAL
PATH INTERP SPEC-IMP: NORMAL
STAT OF ADQ CVX/VAG CYTO-IMP: NORMAL

## 2020-11-23 ENCOUNTER — APPOINTMENT (OUTPATIENT)
Dept: MAMMOGRAPHY | Facility: HOSPITAL | Age: 45
End: 2020-11-23

## 2021-01-06 RX ORDER — METFORMIN HYDROCHLORIDE 500 MG/1
TABLET, EXTENDED RELEASE ORAL
Qty: 30 TABLET | Refills: 2 | OUTPATIENT
Start: 2021-01-06

## 2021-02-10 ENCOUNTER — TRANSCRIBE ORDERS (OUTPATIENT)
Dept: ADMINISTRATIVE | Facility: HOSPITAL | Age: 46
End: 2021-02-10

## 2021-02-10 DIAGNOSIS — Z01.818 PREOP TESTING: Primary | ICD-10-CM

## 2021-02-13 ENCOUNTER — LAB (OUTPATIENT)
Dept: LAB | Facility: HOSPITAL | Age: 46
End: 2021-02-13

## 2021-02-13 LAB — SARS-COV-2 ORF1AB RESP QL NAA+PROBE: NOT DETECTED

## 2021-02-13 PROCEDURE — U0004 COV-19 TEST NON-CDC HGH THRU: HCPCS | Performed by: PAIN MEDICINE

## 2021-02-13 PROCEDURE — C9803 HOPD COVID-19 SPEC COLLECT: HCPCS | Performed by: PAIN MEDICINE

## 2021-04-28 ENCOUNTER — OFFICE VISIT (OUTPATIENT)
Dept: OBSTETRICS AND GYNECOLOGY | Facility: CLINIC | Age: 46
End: 2021-04-28

## 2021-04-28 VITALS
BODY MASS INDEX: 40.3 KG/M2 | SYSTOLIC BLOOD PRESSURE: 128 MMHG | HEIGHT: 62 IN | WEIGHT: 219 LBS | DIASTOLIC BLOOD PRESSURE: 84 MMHG

## 2021-04-28 DIAGNOSIS — N93.9 ABNORMAL UTERINE BLEEDING (AUB): Primary | ICD-10-CM

## 2021-04-28 LAB — HCG INTACT+B SERPL-ACNC: <0.5 MIU/ML

## 2021-04-28 PROCEDURE — 99214 OFFICE O/P EST MOD 30 MIN: CPT | Performed by: OBSTETRICS & GYNECOLOGY

## 2021-04-28 RX ORDER — MOMETASONE FUROATE 1 MG/G
OINTMENT TOPICAL
COMMUNITY
Start: 2021-03-22 | End: 2022-08-16

## 2021-04-28 RX ORDER — ACETAMINOPHEN AND CODEINE PHOSPHATE 120; 12 MG/5ML; MG/5ML
1 SOLUTION ORAL DAILY
Qty: 28 TABLET | Refills: 3 | Status: SHIPPED | OUTPATIENT
Start: 2021-04-28 | End: 2021-08-09

## 2021-04-28 NOTE — PROGRESS NOTES
Subjective   Maribel Whitaker is a 45 y.o. female  YOB: 1975        Chief Complaint   Patient presents with   • Metrorrhagia     pt here c/o irregular bleeding since 2020, had u/s today,        45-year-old female  2 para 2 last menstrual period 2021 presents with complaints of abnormal uterine bleeding.  Patient reports that it is been present for approximately the past 6 months.  She reports her last cycle she bled and bled for approximately 2 weeks.  Reports 4-5 of those days are heavy.  The heaviest she had to change a pad approximately every 45 minutes to an hour.  Reports occasional hot flashes and night sweats at this time.  She is sexually active with one partner at this time.  She voices no other new complaints.      No Known Allergies    Past Medical History:   Diagnosis Date   • Acid reflux    • ADHD (attention deficit hyperactivity disorder)    • Allergic    • Anxiety    • Arthritis    • Depression    • Diabetes (CMS/HCC)    • Fibromyalgia    • Hypertension    • Migraines        Family History   Problem Relation Age of Onset   • Cancer Mother    • Diabetes Mother    • Obesity Mother    • Heart disease Father    • Diabetes Father    • Obesity Father    • Breast cancer Maternal Aunt 65   • Ovarian cancer Neg Hx    • Uterine cancer Neg Hx    • Colon cancer Neg Hx        Social History     Socioeconomic History   • Marital status:      Spouse name: Not on file   • Number of children: Not on file   • Years of education: Not on file   • Highest education level: Not on file   Tobacco Use   • Smoking status: Never Smoker   • Smokeless tobacco: Never Used   Substance and Sexual Activity   • Alcohol use: No   • Drug use: No   • Sexual activity: Yes     Partners: Male     Birth control/protection: None         Current Outpatient Medications:   •  ALPRAZolam (XANAX) 1 MG tablet, 1 mg Daily., Disp: , Rfl:   •  baclofen (LIORESAL) 10 MG tablet, 10 mg 2 (Two) Times  a Day., Disp: , Rfl:   •  Blood Glucose Monitoring Suppl (ONE TOUCH ULTRA 2) w/Device kit, 2 (Two) Times a Day., Disp: , Rfl:   •  [employer] Lancets Micro Thin 33G misc, 2 (Two) Times a Day., Disp: , Rfl:   •  DULoxetine (CYMBALTA) 60 MG capsule, Take 60 mg by mouth Daily., Disp: , Rfl: 3  •  gabapentin (NEURONTIN) 300 MG capsule, 300 mg 3 (Three) Times a Day., Disp: , Rfl:   •  lamoTRIgine (LaMICtal) 150 MG tablet, Take 150 mg by mouth 2 (Two) Times a Day., Disp: , Rfl:   •  lisinopril (PRINIVIL,ZESTRIL) 20 MG tablet, Take 20 mg by mouth Daily., Disp: , Rfl:   •  metFORMIN (Glucophage) 500 MG tablet, Take 1 tablet by mouth Daily With Breakfast., Disp: 30 tablet, Rfl: 11  •  mometasone (ELOCON) 0.1 % ointment, APPLY TO BUG BITES TWICE DAILY UNTIL CLEAR. DO NOT USE ON FACE., Disp: , Rfl:   •  mometasone (NASONEX) 50 MCG/ACT nasal spray, 2 sprays into the nostril(s) as directed by provider Daily., Disp: 17 g, Rfl: 12  •  pantoprazole (PROTONIX) 40 MG EC tablet, pantoprazole 40 mg tablet,delayed release, Disp: , Rfl:   •  traMADol (ULTRAM) 50 MG tablet, tramadol 50 mg tablet  1 tab TID, Disp: , Rfl:   •  traZODone (DESYREL) 150 MG tablet, , Disp: , Rfl:   •  norethindrone (MICRONOR) 0.35 MG tablet, Take 1 tablet by mouth Daily for 112 days., Disp: 28 tablet, Rfl: 3    Patient's last menstrual period was 2021 (approximate).    Sexual History:         Could not be calculated    Past Surgical History:   Procedure Laterality Date   •  SECTION     • CHOLECYSTECTOMY     • GASTRIC SLEEVE LAPAROSCOPIC         Review of Systems   Genitourinary: Positive for menstrual problem and vaginal bleeding.       Objective   Physical Exam  Vitals and nursing note reviewed. Exam conducted with a chaperone present.   Constitutional:       General: She is not in acute distress.     Appearance: She is well-developed.   HENT:      Head: Normocephalic and atraumatic.   Eyes:      General:         Right eye: No discharge.         Left  "eye: No discharge.      Conjunctiva/sclera: Conjunctivae normal.   Pulmonary:      Effort: Pulmonary effort is normal.   Musculoskeletal:         General: Normal range of motion.      Cervical back: Normal range of motion and neck supple.   Skin:     General: Skin is warm and dry.   Neurological:      Mental Status: She is alert and oriented to person, place, and time.   Psychiatric:         Behavior: Behavior normal.         Judgment: Judgment normal.           Vitals:    04/28/21 0931   BP: 128/84   BP Location: Left arm   Patient Position: Sitting   Cuff Size: Large Adult   Weight: 99.3 kg (219 lb)   Height: 157.5 cm (62\")       Diagnoses and all orders for this visit:    1. Abnormal uterine bleeding (AUB) (Primary)  -     CBC & Differential  -     HCG, B-subunit, Quantitative    Other orders  -     norethindrone (MICRONOR) 0.35 MG tablet; Take 1 tablet by mouth Daily for 112 days.  Dispense: 28 tablet; Refill: 3  -     Manual Differential    Transvaginal ultrasound was reviewed and was noted to be within normal limits.  Her endometrial lining was noted to be 6 mm.  Discussed different management options for cycle regulation at this time.  Will order CBC to make sure patient is not anemic from the amount of bleeding from her last cycle.  Discussed with patient different management options including birth control with combined oral contraceptives, Ortho Evra, Depo, Nexplanon as well as Mirena IUD.  Will place patient on Micronor at this time due to her previous medical history.  Return to clinic in 3 months for follow-up examination or sooner symptoms worsen.  All questions answered patient verbalized understanding of plan.  Lissett Ornelas, DO       "

## 2021-04-29 LAB
BASOPHILS # BLD AUTO: ABNORMAL 10*3/UL
DIFFERENTIAL COMMENT: ABNORMAL
EOSINOPHIL # BLD AUTO: ABNORMAL 10*3/UL
EOSINOPHIL # BLD MANUAL: 0.07 10*3/MM3 (ref 0–0.4)
EOSINOPHIL NFR BLD AUTO: ABNORMAL %
EOSINOPHIL NFR BLD MANUAL: 1 % (ref 0.3–6.2)
ERYTHROCYTE [DISTWIDTH] IN BLOOD BY AUTOMATED COUNT: 19.4 % (ref 12.3–15.4)
HCT VFR BLD AUTO: 31.8 % (ref 34–46.6)
HGB BLD-MCNC: 9.3 G/DL (ref 12–15.9)
LYMPHOCYTES # BLD AUTO: ABNORMAL 10*3/UL
LYMPHOCYTES # BLD MANUAL: 1.78 10*3/MM3 (ref 0.7–3.1)
LYMPHOCYTES NFR BLD AUTO: ABNORMAL %
LYMPHOCYTES NFR BLD MANUAL: 26 % (ref 19.6–45.3)
MCH RBC QN AUTO: 18.9 PG (ref 26.6–33)
MCHC RBC AUTO-ENTMCNC: 29.2 G/DL (ref 31.5–35.7)
MCV RBC AUTO: 64.6 FL (ref 79–97)
MONOCYTES # BLD MANUAL: 0.21 10*3/MM3 (ref 0.1–0.9)
MONOCYTES NFR BLD AUTO: ABNORMAL %
MONOCYTES NFR BLD MANUAL: 3 % (ref 5–12)
NEUTROPHILS # BLD MANUAL: 4.8 10*3/MM3 (ref 1.7–7)
NEUTROPHILS NFR BLD AUTO: ABNORMAL %
NEUTROPHILS NFR BLD MANUAL: 70 % (ref 42.7–76)
PLATELET # BLD AUTO: 345 10*3/MM3 (ref 140–450)
PLATELET BLD QL SMEAR: ABNORMAL
RBC # BLD AUTO: 4.92 10*6/MM3 (ref 3.77–5.28)
RBC MORPH BLD: ABNORMAL
WBC # BLD AUTO: 6.86 10*3/MM3 (ref 3.4–10.8)

## 2021-05-07 ENCOUNTER — OFFICE VISIT (OUTPATIENT)
Dept: INTERNAL MEDICINE | Facility: CLINIC | Age: 46
End: 2021-05-07

## 2021-05-07 VITALS
OXYGEN SATURATION: 98 % | DIASTOLIC BLOOD PRESSURE: 78 MMHG | RESPIRATION RATE: 16 BRPM | WEIGHT: 218.8 LBS | SYSTOLIC BLOOD PRESSURE: 124 MMHG | HEIGHT: 62 IN | BODY MASS INDEX: 40.27 KG/M2 | HEART RATE: 69 BPM | TEMPERATURE: 98 F

## 2021-05-07 DIAGNOSIS — E55.9 VITAMIN D DEFICIENCY: ICD-10-CM

## 2021-05-07 DIAGNOSIS — M54.12 CERVICAL RADICULOPATHY: ICD-10-CM

## 2021-05-07 DIAGNOSIS — Z00.01 ANNUAL VISIT FOR GENERAL ADULT MEDICAL EXAMINATION WITH ABNORMAL FINDINGS: ICD-10-CM

## 2021-05-07 DIAGNOSIS — Z98.84 S/P LAPAROSCOPIC SLEEVE GASTRECTOMY: ICD-10-CM

## 2021-05-07 DIAGNOSIS — D50.8 OTHER IRON DEFICIENCY ANEMIA: ICD-10-CM

## 2021-05-07 DIAGNOSIS — G89.29 CHRONIC NECK PAIN: Primary | ICD-10-CM

## 2021-05-07 DIAGNOSIS — E66.01 CLASS 3 SEVERE OBESITY DUE TO EXCESS CALORIES WITH SERIOUS COMORBIDITY AND BODY MASS INDEX (BMI) OF 40.0 TO 44.9 IN ADULT (HCC): ICD-10-CM

## 2021-05-07 DIAGNOSIS — R73.02 IMPAIRED GLUCOSE TOLERANCE: ICD-10-CM

## 2021-05-07 DIAGNOSIS — M54.2 CHRONIC NECK PAIN: Primary | ICD-10-CM

## 2021-05-07 DIAGNOSIS — K21.9 GASTROESOPHAGEAL REFLUX DISEASE, UNSPECIFIED WHETHER ESOPHAGITIS PRESENT: ICD-10-CM

## 2021-05-07 DIAGNOSIS — I10 ESSENTIAL HYPERTENSION: ICD-10-CM

## 2021-05-07 PROBLEM — F32.A DEPRESSIVE DISORDER: Status: ACTIVE | Noted: 2021-05-07

## 2021-05-07 PROBLEM — E66.813 CLASS 3 SEVERE OBESITY DUE TO EXCESS CALORIES WITH SERIOUS COMORBIDITY AND BODY MASS INDEX (BMI) OF 40.0 TO 44.9 IN ADULT: Status: ACTIVE | Noted: 2019-11-11

## 2021-05-07 PROBLEM — M54.9 CHRONIC BACK PAIN: Status: ACTIVE | Noted: 2017-05-16

## 2021-05-07 PROCEDURE — 99204 OFFICE O/P NEW MOD 45 MIN: CPT | Performed by: INTERNAL MEDICINE

## 2021-05-07 RX ORDER — PANTOPRAZOLE SODIUM 40 MG/1
40 TABLET, DELAYED RELEASE ORAL DAILY
Qty: 30 TABLET | Refills: 5 | Status: SHIPPED | OUTPATIENT
Start: 2021-05-07 | End: 2021-10-25

## 2021-05-07 RX ORDER — FERROUS SULFATE 325(65) MG
325 TABLET ORAL
COMMUNITY
End: 2021-08-10

## 2021-05-07 RX ORDER — LISINOPRIL 20 MG/1
20 TABLET ORAL DAILY
Qty: 30 TABLET | Refills: 5 | Status: SHIPPED | OUTPATIENT
Start: 2021-05-07 | End: 2021-11-10

## 2021-05-09 DIAGNOSIS — K21.9 GASTROESOPHAGEAL REFLUX DISEASE, UNSPECIFIED WHETHER ESOPHAGITIS PRESENT: ICD-10-CM

## 2021-05-10 DIAGNOSIS — K21.9 GASTROESOPHAGEAL REFLUX DISEASE, UNSPECIFIED WHETHER ESOPHAGITIS PRESENT: ICD-10-CM

## 2021-05-10 RX ORDER — PANTOPRAZOLE SODIUM 40 MG/1
40 TABLET, DELAYED RELEASE ORAL DAILY
Qty: 30 TABLET | Refills: 5 | OUTPATIENT
Start: 2021-05-10

## 2021-05-11 ENCOUNTER — APPOINTMENT (OUTPATIENT)
Dept: LAB | Facility: HOSPITAL | Age: 46
End: 2021-05-11

## 2021-05-11 ENCOUNTER — TRANSCRIBE ORDERS (OUTPATIENT)
Dept: LAB | Facility: HOSPITAL | Age: 46
End: 2021-05-11

## 2021-05-11 DIAGNOSIS — Z01.818 PRE-OP TESTING: Primary | ICD-10-CM

## 2021-05-12 ENCOUNTER — TREATMENT (OUTPATIENT)
Dept: PHYSICAL THERAPY | Facility: CLINIC | Age: 46
End: 2021-05-12

## 2021-05-12 DIAGNOSIS — M54.2 CERVICAL PAIN: Primary | ICD-10-CM

## 2021-05-12 DIAGNOSIS — M54.12 RADICULOPATHY, CERVICAL: ICD-10-CM

## 2021-05-12 PROCEDURE — 97162 PT EVAL MOD COMPLEX 30 MIN: CPT | Performed by: PHYSICAL THERAPIST

## 2021-05-12 NOTE — PROGRESS NOTES
Physical Therapy Therapy Initial Evaluation and Plan of Care    Patient: Maribel Whitaker               : 1975  Visit Date: 2021  Referring practitioner: Miguel Mckenzie DO  Date of Initial Visit: 2021  Patient seen for 1 sessions    Visit Diagnoses:    ICD-10-CM ICD-9-CM   1. Cervical pain  M54.2 723.1   2. Radiculopathy, cervical  M54.12 723.4     Past Medical History:   Diagnosis Date   • Acid reflux    • ADHD (attention deficit hyperactivity disorder)    • Allergic    • Anxiety    • Arthritis    • Depression    • Fibromyalgia    • Fibromyalgia, primary    • Hypertension    • Migraines    • Neck ache      Past Surgical History:   Procedure Laterality Date   •  SECTION     • CHOLECYSTECTOMY     • GASTRIC SLEEVE LAPAROSCOPIC  2019       SUBJECTIVE     Subjective Evaluation    History of Present Illness  Date of onset: 2016  Mechanism of injury: She has a long h/o neck pain. We saw her a couple of years ago for this before she was discharged. She is going to pain management. She has daily headaches and says she is having josef radicular symptoms and is dropping things now. She reports having occasions of leaking urine trying to get to the bathroom. It's been getting worse over the last couple of years.       Patient Occupation: unemployed Pain  Current pain ratin  At best pain ratin  At worst pain rating: 10  Quality: dull ache, burning and throbbing  Aggravating factors: lifting    Social Support  Lives with: adult children and significant other    Hand dominance: right    Diagnostic Tests  MRI studies: abnormal (from 2 years ago: multi-level DDD with bulging)    Treatments  Previous treatment: physical therapy  Patient Goals  Patient goals for therapy: decreased pain, increased motion and increased strength         PT G-Codes  Outcome Measure Options: Neck Disability Index (NDI)  Neck Disability Index Score: 36 (72%)    OBJECTIVE     Objective          Palpation   Left    Hypertonic in the levator scapulae, suboccipitals and upper trapezius.   Tenderness of the levator scapulae, suboccipitals and upper trapezius.   Trigger point to levator scapulae, suboccipitals and upper trapezius.     Right   Hypertonic in the levator scapulae, suboccipitals and upper trapezius. Tenderness of the levator scapulae, suboccipitals and upper trapezius.   Trigger point to levator scapulae, suboccipitals and upper trapezius.     Tenderness   Cervical Spine   Tenderness in the facet joint.     Neurological Testing     Sensation   Cervical/Thoracic   Left   Intact: light touch    Right   Diminished: light touch    Comments   Right light touch: C6 dermatome.     Reflexes   Left   Biceps (C5/C6): trace (1+)  Brachioradialis (C6): normal (2+)  Triceps (C7): normal (2+)    Right   Biceps (C5/C6): trace (1+)  Brachioradialis (C6): normal (2+)  Triceps (C7): normal (2+)    Active Range of Motion   Cervical/Thoracic Spine   Cervical    Flexion: 22 degrees with pain  Extension: 3 degrees with pain  Left rotation: 30 degrees with pain  Right rotation: 20 degrees with pain  Left Shoulder   Flexion: 90 degrees with pain    Right Shoulder   Flexion: 90 degrees with pain    Additional Active Range of Motion Details  Increased neck pain with attempt at elevation past 90 deg    Strength/Myotome Testing     Left Shoulder     Planes of Motion   Flexion: 4-   Abduction: 4     Right Shoulder     Planes of Motion   Flexion: 4-   Abduction: 4     Left Elbow   Flexion: 4+  Extension: 4    Right Elbow   Flexion: 4+  Extension: 4    Left Wrist/Hand   Wrist extension: WFL  Wrist flexion: WFL    Right Wrist/Hand   Wrist extension: WFL  Wrist flexion: WFL    Tests   Cervical     Right   Positive active compression ([place]), cervical distraction and Spurling's sign.       Manual Therapy     [id number]  Comments   Prone CT ext mobs Very gentle sustained   Prone josef UT STM    Supine subocc release Gentle sustained           Timed  Minutes         Therapy Education/Self Care [id number]   Details: Posture; scapular retraction   Given Home Exercise Program  symptom relief   Progress: New   Education provided to:  Patient   Level of understanding Verbalized and Demonstrated   Timed Minutes          Total Timed Treatment:     0   mins  Total Time of Visit:            45   mins    ASSESSMENT/PLAN   GOALS:  Goals                                                    Progress Note due by 6/11/21   STG by: 3 weeks Comments Status   Improve mobility through thoracic and CT junction      Decreased muscle guarding  throughout neck and shoulder girdle                    LTG by: 6 weeks      Reports no radicular symptoms for a week      Improve cervical rotation josef to 60 deg with no pain     Report no neck/shoulder pain except occasional minor twinges no more than 2-3/10     Understands improved ergonomics for work and HEP for flexibility and posture       Improve NDI score to 20% or less       Able to elevate her shoulders josef to 130 deg actively            Assessment & Plan     Assessment  Impairments: abnormal muscle firing, abnormal muscle tone, abnormal or restricted ROM, activity intolerance, impaired physical strength, lacks appropriate home exercise program and pain with function  Assessment details: She is extremely guarded with josef neck spasms and very limited AROM of her neck and shoulders. She has a severe forward head and rounded posture. She is hypersensitive to palpation through her neck. She does describe difficulty with incontinence worsening over the last couple of years. She also had back pain so this might be a problem with her back as well. She should be able to progress well with PT if she will be consistent with her HEP.   Prognosis: good  Functional Limitations: uncomfortable because of pain, sitting and unable to perform repetitive tasks  Plan  Therapy options: will be seen for skilled physical therapy services  Planned modality  interventions: dry needling, low level laser therapy, TENS and traction  Planned therapy interventions: manual therapy, neuromuscular re-education, spinal/joint mobilization, home exercise program, body mechanics training, stretching, therapeutic activities, strengthening, soft tissue mobilization and postural training  Frequency: 2x week  Duration in visits: 12  Treatment plan discussed with: patient  Plan details: Focus early on pain relief with soft tissue work and other modalities, including potentially dry needling. Manual therapy used for mobilizations of the spine and upper thoracic and flexibility through the upper girdle. Progress with stability for posture and the shoulder girdle and progress HEP for the same.         PT SIGNATURE: Dedrick Fatima, PT       Initial Certification  Certification Period: 8/10/2021  I certify that the therapy services are furnished while this patient is under my care.  The services outlined above are required by this patient, and will be reviewed every 90 days.     PHYSICIAN:     Miguel Mckenzie, DO______________________________________DATE: _________     Please sign and return via fax to [phone].   Thank you so much for letting us work with Maribel. I appreciate your letting us work with your patients. If you have any questions or concerns, please don't hesitate to contact me.

## 2021-05-13 ENCOUNTER — LAB (OUTPATIENT)
Dept: LAB | Facility: HOSPITAL | Age: 46
End: 2021-05-13

## 2021-05-13 LAB — SARS-COV-2 ORF1AB RESP QL NAA+PROBE: NOT DETECTED

## 2021-05-13 PROCEDURE — U0004 COV-19 TEST NON-CDC HGH THRU: HCPCS | Performed by: PAIN MEDICINE

## 2021-05-13 PROCEDURE — C9803 HOPD COVID-19 SPEC COLLECT: HCPCS | Performed by: PAIN MEDICINE

## 2021-05-19 ENCOUNTER — TREATMENT (OUTPATIENT)
Dept: PHYSICAL THERAPY | Facility: CLINIC | Age: 46
End: 2021-05-19

## 2021-05-19 DIAGNOSIS — M54.12 RADICULOPATHY, CERVICAL: ICD-10-CM

## 2021-05-19 DIAGNOSIS — M54.2 CERVICAL PAIN: Primary | ICD-10-CM

## 2021-05-19 PROCEDURE — 97014 ELECTRIC STIMULATION THERAPY: CPT | Performed by: PHYSICAL THERAPIST

## 2021-05-19 PROCEDURE — 97140 MANUAL THERAPY 1/> REGIONS: CPT | Performed by: PHYSICAL THERAPIST

## 2021-05-19 NOTE — PROGRESS NOTES
Physical Therapy Treatment Note    Patient: Maribel Whitaker                                                                                     Visit Date: 2021  :     1975    Referring practitioner:    Miguel Mckenzie DO  Date of Initial Visit:          Type: THERAPY  Noted: 2021    Patient seen for 2 sessions    Visit Diagnoses:    ICD-10-CM ICD-9-CM   1. Cervical pain  M54.2 723.1   2. Radiculopathy, cervical  M54.12 723.4     SUBJECTIVE     Subjective She reports having an injection on Monday and has been feeling good since then. She reports having a little HA hear and there. She reports some soreness following her initial evaluation.     PAIN: 3/10 HA pre   410 post          OBJECTIVE     Objective     Manual Therapy     12026  Comments   Gentle IASTM with pink roller to upper thoracic paraspinals in prone Very light    STM with massage cream to B UT in prone Very gentle    Gentle upper thoracic and CT junction extension mobilizations in prone  Gentle grade 1    Gentle cervical paraspinals and subbocipitals STM in supine  Gentle    Attempted subocpital release and traction but too painful.     Timed Minutes 30        Modalities Comments   TENS (premod) B UT 4 pads Intensity 5    Moist heat during TENS     Minutes 10         Therapy Education/Self Care [id number]   Details: Posture and pain relief at home    Given Home Exercise Program  postural retraining  symptom relief   Progress: Reinforced   Education provided to:  Patient   Level of understanding Verbalized   Timed Minutes          Total Timed Treatment:     30  mins  Total Time of Visit:              45  mins         ASSESSMENT/PLAN     GOALS  Goals                                                    Progress Note due by 21   STG by: 3 weeks Comments Status   Improve mobility through thoracic and CT junction    Ongoing    Decreased muscle guarding  throughout neck and shoulder girdle    Ongoing                   LTG by: 6 weeks        Reports no radicular symptoms for a week    Ongoing   Improve cervical rotation josef to 60 deg with no pain    Ongoing   Report no neck/shoulder pain except occasional minor twinges no more than 2-3/10    Ongoing   Understands improved ergonomics for work and HEP for flexibility and posture    Ongoing    Improve NDI score to 20% or less    Ongoing    Able to elevate her shoulders josef to 130 deg actively    Ongoing         Assessment/Plan     ASSESSMENT: Today is Maribel's first appointment since initial evaluation therefore no goals have been achieved at this time. She was very sensitive to all manual therapy and could only tolerate light pressure. She tolerated thoracic STM and gentle mobs better but could not tolerate any movement in the cervical spine or suboccipital release. TENS and heat was utilized post session; however her pain was still elevated post session. She was informed on dry needling today and reports wanting to try it.     PLAN: Perform DN (1st time) to address her HA's and Continue with manual techniques to improve her thoracic and cervical mobility as tolerated.     Signature: Slime Encinas, HELLEN

## 2021-05-21 ENCOUNTER — TREATMENT (OUTPATIENT)
Dept: PHYSICAL THERAPY | Facility: CLINIC | Age: 46
End: 2021-05-21

## 2021-05-21 DIAGNOSIS — M54.2 CERVICAL PAIN: Primary | ICD-10-CM

## 2021-05-21 DIAGNOSIS — M54.12 RADICULOPATHY, CERVICAL: ICD-10-CM

## 2021-05-21 PROCEDURE — 97140 MANUAL THERAPY 1/> REGIONS: CPT | Performed by: PHYSICAL THERAPIST

## 2021-05-21 NOTE — PROGRESS NOTES
"Physical Therapy Treatment Note    Patient: Maribel Whitaker                                                                                     Visit Date: 2021  :     1975    Referring practitioner:    Miguel Mckenzie DO  Date of Initial Visit:          Type: THERAPY  Noted: 2021    Patient seen for 3 sessions    Visit Diagnoses:    ICD-10-CM ICD-9-CM   1. Cervical pain  M54.2 723.1   2. Radiculopathy, cervical  M54.12 723.4     SUBJECTIVE     Subjective She says she would like to try the dry needling. Her c/c today is her headache and her lower neck.     PAIN: 5/10 HA pre   4/10 post          OBJECTIVE     Objective      Dry Needle Location [ (1-2 muscles)/ (3 or more muscles)]   Location Size (\") Comment   bilateral accessory n/UT 2    bilateral dorsal scap n 2    bilateral C4,7,T1 post cut n 2 Used 2\" in T1 due to depth of tissue here   bilateral inf cerv oblique n 1    bilateral subocc mms  UT/Splenius cap mms   bilateral deep radial n 1           TRIAL     Manual Therapy     [id number]  Comments            CT junction extension mobilizations in prone     Gentle cervical paraspinals, UT, and suboccipitals STM in supine     subocc release and traction Tolerate more tension today   Timed Minutes 40        Therapy Education/Self Care [id number]   Details: Posture and pain relief at home    Given Home Exercise Program  postural retraining  symptom relief   Progress: Reinforced   Education provided to:  Patient   Level of understanding Verbalized   Timed Minutes          Total Timed Treatment:     40  mins  Total Time of Visit:              55  mins         ASSESSMENT/PLAN     GOALS  Goals                                                    Progress Note due by 21   STG by: 3 weeks Comments Status   Improve mobility through thoracic and CT junction  quite stiff  Ongoing    Decreased muscle guarding  throughout neck and shoulder girdle    Ongoing                   LTG by: 6 weeks     " "  Reports no radicular symptoms for a week    Ongoing   Improve cervical rotation josef to 60 deg with no pain    Ongoing   Report no neck/shoulder pain except occasional minor twinges no more than 2-3/10  5/10  Ongoing   Understands improved ergonomics for work and HEP for flexibility and posture    Ongoing    Improve NDI score to 20% or less    Ongoing    Able to elevate her shoulders josef to 130 deg actively    Ongoing         Assessment/Plan     ASSESSMENT: She thought she could tell a little difference with the DN but not too much. She did tolerate the manual therapy better today and said after she felt \"lighter\". I don't know if that was the DN or the manual therapy or time.     PLAN: Cont with manual therapy to loosen CT junction and upper cervical and try DN again is she requests.     Signature: Dedrick Fatima, PT      "

## 2021-05-27 ENCOUNTER — TREATMENT (OUTPATIENT)
Dept: PHYSICAL THERAPY | Facility: CLINIC | Age: 46
End: 2021-05-27

## 2021-05-27 DIAGNOSIS — M54.12 RADICULOPATHY, CERVICAL: ICD-10-CM

## 2021-05-27 DIAGNOSIS — M54.2 CERVICAL PAIN: Primary | ICD-10-CM

## 2021-05-27 PROCEDURE — 97140 MANUAL THERAPY 1/> REGIONS: CPT | Performed by: PHYSICAL THERAPIST

## 2021-05-27 PROCEDURE — 97032 APPL MODALITY 1+ESTIM EA 15: CPT | Performed by: PHYSICAL THERAPIST

## 2021-05-27 NOTE — PROGRESS NOTES
Physical Therapy Treatment Note    Patient: Maribel Whitaker                                                                                     Visit Date: 2021  :     1975    Referring practitioner:    Miguel Mckenzie DO  Date of Initial Visit:          Type: THERAPY  Noted: 2021    Patient seen for 4 sessions    Visit Diagnoses:    ICD-10-CM ICD-9-CM   1. Cervical pain  M54.2 723.1   2. Radiculopathy, cervical  M54.12 723.4     SUBJECTIVE     Subjective She reports the needling hurt and she didn't notice any difference. She reports N/T in all her fingers in both hands which are constant.     PAIN: pre 3/10         OBJECTIVE     Objective      Manual Therapy     [id number]  Comments   Scratch Collapse testing (see assessment section)    STM B UT,LS, and cervical paraspinals In massage chair   Thoracic STM with pink roller In massage chair   Thoracic extension mobilizations Grade1 In massage chair       Timed Minutes 35        Modalities Comments   Combo Cold Laser/E stim Chronic Pain Mode B UT In massage chair       Minutes 10       Therapy Education/Self Care 29674   Details:    Given postural retraining   Progress: New   Education provided to:  Patient   Level of understanding Verbalized and Continued reinforcement needed   Timed Minutes          Total Timed Treatment:    45   mins  Total Time of Visit:             45   mins         ASSESSMENT/PLAN     GOALS  Goals                                                    Progress Note due by 21   STG by: 3 weeks Comments Status   Improve mobility through thoracic and CT junction    Ongoing    Decreased muscle guarding  throughout neck and shoulder girdle    Ongoing                   LTG by: 6 weeks       Reports no radicular symptoms for a week    Ongoing   Improve cervical rotation josef to 60 deg with no pain    Ongoing   Report no neck/shoulder pain except occasional minor twinges no more than 2-3/10    Ongoing   Understands improved ergonomics  for work and HEP for flexibility and posture    Ongoing    Improve NDI score to 20% or less    Ongoing    Able to elevate her shoulders josef to 130 deg actively    Ongoing         Assessment/Plan     ASSESSMENT: With Scratch Collapse testing she was positive at her B scalenes and pec regions. Her posture is very poor and the biggest obstacle we will have to overcome. I used photo analysis today to improve her postural awareness and we will need to focus on patient education and encouragement going forward.    PLAN: Continue to progress conservatively and focus on patient education.    Signature: Juan Carlos Lewis, PTA

## 2021-06-01 ENCOUNTER — TREATMENT (OUTPATIENT)
Dept: PHYSICAL THERAPY | Facility: CLINIC | Age: 46
End: 2021-06-01

## 2021-06-01 DIAGNOSIS — M54.2 CERVICAL PAIN: ICD-10-CM

## 2021-06-01 DIAGNOSIS — M54.12 RADICULOPATHY, CERVICAL: Primary | ICD-10-CM

## 2021-06-01 PROCEDURE — 97032 APPL MODALITY 1+ESTIM EA 15: CPT | Performed by: PHYSICAL THERAPIST

## 2021-06-01 PROCEDURE — 97140 MANUAL THERAPY 1/> REGIONS: CPT | Performed by: PHYSICAL THERAPIST

## 2021-06-01 NOTE — PROGRESS NOTES
"Physical Therapy Treatment Note    Patient: Maribel Whitaker                                                                                     Visit Date: 2021  :     1975    Referring practitioner:    Miguel Mckenzie DO  Date of Initial Visit:          Type: THERAPY  Noted: 2021    Patient seen for 5 sessions    Visit Diagnoses:    ICD-10-CM ICD-9-CM   1. Radiculopathy, cervical  M54.12 723.4   2. Cervical pain  M54.2 723.1     SUBJECTIVE     Subjective She reports that her neck is hurting today on her \"hump\". She reports feeling okay following last session but did not like the needling the previous session    PAIN: pre 3/10 neck 5/10 post session       OBJECTIVE     Objective      Manual Therapy     [id number]  Comments   Cervical paraspinals gentle STM in supine  Very light    STM B UT,LS, and cervical paraspinals In massage chair   Thoracic STM with pink roller In massage chair   Thoracic extension mobilizations Grade1 In massage chair       Timed Minutes 35        Modalities Comments   Combo Cold Laser/E stim Chronic Pain Mode B UT In massage chair       Minutes 10       Therapy Education/Self Care 54904   Details: Continued education on posture    Given postural retraining   Progress: Reinforced   Education provided to:  Patient   Level of understanding Verbalized and Continued reinforcement needed   Timed Minutes          Total Timed Treatment:    45   mins  Total Time of Visit:             45   mins         ASSESSMENT/PLAN     GOALS  Goals                                                    Progress Note due by 21                                                                  Re-cert due by: 8/10/21   STG by: 3 weeks Comments Status   Improve mobility through thoracic and CT junction    Ongoing    Decreased muscle guarding  throughout neck and shoulder girdle    Ongoing                   LTG by: 6 weeks       Reports no radicular symptoms for a week    Ongoing   Improve cervical " rotation josef to 60 deg with no pain    Ongoing   Report no neck/shoulder pain except occasional minor twinges no more than 2-3/10 Still increases to a 6-7/10 some days  Ongoing   Understands improved ergonomics for work and HEP for flexibility and posture    Ongoing    Improve NDI score to 20% or less    Ongoing    Able to elevate her shoulders josef to 130 deg actively    Ongoing         Assessment/Plan     ASSESSMENT: She denies little progress thus far with continued pain in her lower neck and around her CT junction. She tolerated STM to B UT's better today but was still very sensitive to thoracic mobilizations and cervical STM. I continued to educate on posture today and she reports working on this at home     PLAN: Continue to progress conservatively, focusing on education and improving cervical and thoracic ROM.     Signature: Slime Encinas, PTA

## 2021-06-03 ENCOUNTER — TREATMENT (OUTPATIENT)
Dept: PHYSICAL THERAPY | Facility: CLINIC | Age: 46
End: 2021-06-03

## 2021-06-03 DIAGNOSIS — M54.2 CERVICAL PAIN: ICD-10-CM

## 2021-06-03 DIAGNOSIS — M54.12 RADICULOPATHY, CERVICAL: Primary | ICD-10-CM

## 2021-06-03 PROCEDURE — 97032 APPL MODALITY 1+ESTIM EA 15: CPT | Performed by: PHYSICAL THERAPIST

## 2021-06-03 PROCEDURE — 97110 THERAPEUTIC EXERCISES: CPT | Performed by: PHYSICAL THERAPIST

## 2021-06-03 NOTE — PROGRESS NOTES
"Physical Therapy Treatment Note    Patient: Maribel Whitaker                                                                                     Visit Date: 6/3/2021  :     1975    Referring practitioner:    Miguel Mckenzie DO  Date of Initial Visit:          Type: THERAPY  Noted: 2021    Patient seen for 6 sessions    Visit Diagnoses:    ICD-10-CM ICD-9-CM   1. Radiculopathy, cervical  M54.12 723.4   2. Cervical pain  M54.2 723.1     SUBJECTIVE     Subjective She reports having a terrible HA since the last session and attributes it to her buldging disc. She reports pressure at the base of her skull and neck.     PAIN: pre 2/10         OBJECTIVE     Objective      Modalities Comments   Combo Cold Laser/Estim Chronic Pain Mode B UT and lower posterior cervical region Sitting propped forward on elevated table       Minutes 10     Therapeutic Exercises    12780 Comments   Supine \"y's\" with yellow T band 2 x 10     B unilateral D2 flexion in supine with yellow T band 2 x 10 in supine Required much cueing and manual cues to maintain form and reduce substitution    B unilateral supine serratus punches with #1 x 10 each            Timed Minutes 30       Therapy Education/Self Care [id number]   Details:    Given postural retraining  symptom relief   Progress: Reinforced   Education provided to:  Patient   Level of understanding Verbalized   Timed Minutes          Total Timed Treatment:     40   mins  Total Time of Visit:             40   mins         ASSESSMENT/PLAN     GOALS  Goals                                                    Progress Note due by 21                                                                  Re-cert due by: 8/10/21   STG by: 3 weeks Comments Status   Improve mobility through thoracic and CT junction    Ongoing    Decreased muscle guarding  throughout neck and shoulder girdle    Ongoing                   LTG by: 6 weeks       Reports no radicular symptoms for a week    Ongoing "   Improve cervical rotation josef to 60 deg with no pain    Ongoing   Report no neck/shoulder pain except occasional minor twinges no more than 2-3/10   Ongoing   Understands improved ergonomics for work and HEP for flexibility and posture    Ongoing    Improve NDI score to 20% or less    Ongoing    Able to elevate her shoulders josef to 130 deg actively             Assessment/Plan     ASSESSMENT: Today we changed our approach and focused on postural control activities with very light resistance and gravity reduced and/or eliminated. Also I focuesed on patient education and encouragement of a proper movement pattern.    PLAN: Assess her long term response to today's session and continue to encourage postural control and movement without apprehension.    Signature: Juan Carlos Lewis, PTA

## 2021-06-07 ENCOUNTER — TREATMENT (OUTPATIENT)
Dept: PHYSICAL THERAPY | Facility: CLINIC | Age: 46
End: 2021-06-07

## 2021-06-07 DIAGNOSIS — M54.12 RADICULOPATHY, CERVICAL: Primary | ICD-10-CM

## 2021-06-07 DIAGNOSIS — M54.2 CERVICAL PAIN: ICD-10-CM

## 2021-06-07 PROCEDURE — 97032 APPL MODALITY 1+ESTIM EA 15: CPT | Performed by: PHYSICAL THERAPIST

## 2021-06-07 PROCEDURE — 97110 THERAPEUTIC EXERCISES: CPT | Performed by: PHYSICAL THERAPIST

## 2021-06-07 NOTE — PROGRESS NOTES
"Physical Therapy Treatment Note    Patient: Maribel Whitaker                                                                                     Visit Date: 2021  :     1975    Referring practitioner:    Miguel Mckenzie DO  Date of Initial Visit:          Type: THERAPY  Noted: 2021    Patient seen for 7 sessions    Visit Diagnoses:    ICD-10-CM ICD-9-CM   1. Radiculopathy, cervical  M54.12 723.4   2. Cervical pain  M54.2 723.1     SUBJECTIVE     Subjective     PAIN: pre 3/10         OBJECTIVE     Objective      Modalities Comments   Combo Cold Laser/Estim Chronic Pain Mode B UT and lower posterior cervical region Sitting propped forward on elevated table         Minutes 10     Therapeutic Exercises    [id number] Comments   Supine \"y's\" with yellow T band 2 x 12     B unilateral supine serratus punches with #1 2 x 15 each    Seated rows with yellow T band x 15    Progressive thoracic stretch on towel roll        Timed Minutes 30       Therapy Education/Self Care 72934   Details:    Given postural retraining   Progress: Reinforced   Education provided to:  Patient   Level of understanding Verbalized   Timed Minutes          Total Timed Treatment:     40   mins  Total Time of Visit:             40   mins         ASSESSMENT/PLAN     GOALS  Goals                                                    Progress Note due by 21                                                                  Re-cert due by: 8/10/21   STG by: 3 weeks Comments Status   Improve mobility through thoracic and CT junction    Ongoing    Decreased muscle guarding  throughout neck and shoulder girdle    Ongoing                   LTG by: 6 weeks       Reports no radicular symptoms for a week    Ongoing   Improve cervical rotation josef to 60 deg with no pain    Ongoing   Report no neck/shoulder pain except occasional minor twinges no more than 2-3/10    Ongoing   Understands improved ergonomics for work and HEP for flexibility and " posture    Ongoing    Improve NDI score to 20% or less    Ongoing    Able to elevate her shoulders josef to 130 deg actively    Ongiong         Assessment/Plan     ASSESSMENT: She reported today she felt better after the last session; therefore, we will continue a more active than passive approach. We have focused on postural control therex activities although she requires cueing and intermittent manual blocking to reduce and/or eliminated substitutions.    PLAN: Continue to focus on small incremental and deliberate movement that addresses her diminished postural control. Continue also to focus on patient education and encouragement.    Signature: Juan Carlos Lewis, PTA

## 2021-06-09 ENCOUNTER — TREATMENT (OUTPATIENT)
Dept: PHYSICAL THERAPY | Facility: CLINIC | Age: 46
End: 2021-06-09

## 2021-06-09 DIAGNOSIS — M54.12 RADICULOPATHY, CERVICAL: Primary | ICD-10-CM

## 2021-06-09 DIAGNOSIS — M54.2 CERVICAL PAIN: ICD-10-CM

## 2021-06-09 PROCEDURE — 97032 APPL MODALITY 1+ESTIM EA 15: CPT | Performed by: PHYSICAL THERAPIST

## 2021-06-09 PROCEDURE — 97110 THERAPEUTIC EXERCISES: CPT | Performed by: PHYSICAL THERAPIST

## 2021-06-09 PROCEDURE — 97140 MANUAL THERAPY 1/> REGIONS: CPT | Performed by: PHYSICAL THERAPIST

## 2021-06-09 NOTE — PROGRESS NOTES
"Physical Therapy Treatment Note    Patient: Maribel Whitaker                                                                                     Visit Date: 2021  :     1975    Referring practitioner:    Miguel Mckenzie DO  Date of Initial Visit:          Type: THERAPY  Noted: 2021    Patient seen for 8 sessions    Visit Diagnoses:    ICD-10-CM ICD-9-CM   1. Radiculopathy, cervical  M54.12 723.4   2. Cervical pain  M54.2 723.1     SUBJECTIVE     Subjective She feels pretty good. She feels a lot of pressure. It was a little painful to turn her head while driving. It hurts more turning L. Since last visit her pain elevated to a 5/10 with lifting performing housework. It wasn't much heavy lifting but repetitive. Sometimes she will get headaches that last 5 days. She denies any headaches since last visit as well.     PAIN: pre 4/10 < 7/10     OBJECTIVE     Objective     Therapeutic Exercises    [id number] Comments   Supine SA punches 2x10   Attempted HL Ys Increased pain in \"hump\"   Seated Ws against yellow theratube 2x10   Seated TB rows against yellow TB  2x10       Timed Minutes 20     Modalities Comments   Combo Cold Laser/E stim Chronic Pain Mode B UT In massage chair       Minutes 10     Manual Therapy     63587  Comments   IASTM w/ pink foam roller to thoracic paraspinals In massage chair; VERY gentle   Thoracic extension mobilizations In massage chair; VERY gentle       Timed Minutes 10     Therapy Education/Self Care 18981   Details: Encouraged pt to self-check her posture frequently throughout the day and to prioritize relaxation of shoulders.    Given postural retraining   Progress: Reinforced   Education provided to:  Patient   Level of understanding Verbalized   Timed Minutes        Total Timed Treatment:     40   mins  Total Time of Visit:             40   mins         ASSESSMENT/PLAN     GOALS       Goals                                                    Progress Note due by " 6/11/21                                                                  Re-cert due by: 8/10/21   STG by: 3 weeks Comments Status   Improve mobility through thoracic and CT junction  She was very tender over her CT to light OP  Ongoing    Decreased muscle guarding  throughout neck and shoulder girdle    Ongoing                   LTG by: 6 weeks       Reports no radicular symptoms for a week    Ongoing   Improve cervical rotation josef to 60 deg with no pain    Ongoing   Report no neck/shoulder pain except occasional minor twinges no more than 2-3/10    Ongoing   Understands improved ergonomics for work and HEP for flexibility and posture    Ongoing    Improve NDI score to 20% or less    Ongoing    Able to elevate her shoulders josef to 130 deg actively    Ongiong         Assessment/Plan     ASSESSMENT: We began today's session w/ gentle scapular strengthening to which she was unable to tolerate much of without an increase in symptoms. Cold Laser/Estim was continued today to promote relief of symptoms. She reported sensitivity w/ IASTM directly over her spine but was better able to tolerate slight pressure laterally on either side. Very gentle thoracic mobilizations were performed and she reports the more proximal the pressure was applied, the worse her response was.     PLAN: Assess long term response to today's session and continue to find ways to improve her postural strength and endurance w/out flaring symptoms. Bolster HEP to reflect.    Signature: Leslie Magdaleno, PTA

## 2021-06-14 ENCOUNTER — TREATMENT (OUTPATIENT)
Dept: PHYSICAL THERAPY | Facility: CLINIC | Age: 46
End: 2021-06-14

## 2021-06-14 DIAGNOSIS — M54.2 CERVICAL PAIN: ICD-10-CM

## 2021-06-14 DIAGNOSIS — M54.12 RADICULOPATHY, CERVICAL: Primary | ICD-10-CM

## 2021-06-14 PROCEDURE — 97530 THERAPEUTIC ACTIVITIES: CPT | Performed by: PHYSICAL THERAPIST

## 2021-06-14 PROCEDURE — 97140 MANUAL THERAPY 1/> REGIONS: CPT | Performed by: PHYSICAL THERAPIST

## 2021-06-14 NOTE — PROGRESS NOTES
Physical Therapy Treatment Note and 30 Day Progress Note    Patient: Maribel Whitaker                                                                                     Visit Date: 2021  :     1975    Referring practitioner:    Miguel Mckenzie DO  Date of Initial Visit:          Type: THERAPY  Noted: 2021    Patient seen for 9 sessions    Visit Diagnoses:    ICD-10-CM ICD-9-CM   1. Radiculopathy, cervical  M54.12 723.4   2. Cervical pain  M54.2 723.1     SUBJECTIVE     Subjective She had bad pain yesterday. She had a pinched nerve over her shoulder blade. She had a lot of pressure in her neck and feels deeper than it usually is. She has a pulling sensation on her face. She has a history of keeping a pain diary but found that her symptoms were quite random. The only thing she noticed was that weather had an influence. Yesterday her pain elevated to an 8/10. Her pain was increased following last visit for a few hours on it's own. She noticed after her pain subsided that there wasn't as much of a stretching feeling.     PAIN: pre 6/10 < 6/10  PT G-Codes  Outcome Measure Options: Neck Disability Index (NDI)  Neck Disability Index Score: 32  OBJECTIVE     Objective     Therapeutic Activities    87780 Comments   Assessed all goals for progress note                     Timed Minutes 30     Manual Therapy     09065  Comments   STM w/ massage cream to B UT/LS and suboccipitals  In massage chair; VERY gentle   Thoracic extension mobilizations In massage chair; VERY gentle       Timed Minutes 10     Therapy Education/Self Care 37189   Details:    Given postural retraining   Progress: Reinforced   Education provided to:  Patient   Level of understanding Verbalized   Timed Minutes        Total Timed Treatment:     40   mins  Total Time of Visit:             45   mins         ASSESSMENT/PLAN     GOALS        Goals                                                    Progress Note due by  6/11/21                                                                  Re-cert due by: 8/10/21    STG by: 3 weeks Comments Status Date Last Assessed   Improve mobility through thoracic and CT junction Very tender to OP especially over CT  Ongoing  06/14/2021   Decreased muscle guarding  throughout neck and shoulder girdle Pt presented w/ slight guarding in her B suboccipitals Ongoing 06/14/2021   LTG by: 6 weeks        Reports no radicular symptoms for a week Pt reports daily radicular symptoms. Occurrence on her R side more. Instances happen about 10x a day.     If she tries to perform fine-turned activities, she can't feel her finger tips. The more she uses her hands, her hands will jerk.     She likes coloring but could only color 5-10 minutes. If she doesn't squeeze hard, she can control it but it doesn't last long.  Ongoing 06/14/2021   Improve cervical rotation josef to 60 deg with no pain R 33 degrees (20 previously)  L 34 degrees (30 previously) Progressing 06/14/2021   Report no neck/shoulder pain except occasional minor twinges no more than 2-3/10 At best, her pain decreases to a 4/10 with rest, medicine, and heat. Her pain is constant.  Ongoing 06/14/2021   Understands improved ergonomics for work and HEP for flexibility and posture She is not currently working and is not sure when she will work again or where.     She performs HEP daily.  Progressing 06/14/2021    Improve NDI score to 20% or less 32 today (36 previously) Progressing 06/14/2021   Able to elevate her shoulders josef to 130 deg actively R 59 degrees  L 76 degrees  Limited by pain Ongoing 06/14/2021       Assessment/Plan     ASSESSMENT: Today goals were assessed for progress note. At this time Maribel has not yet met any of her goals and doesn't feel much different than when she initially began therapy. She is currently in pain management and received injections once every 3 months and takes the following medications as prescribed:  "gabapentin(4x/day), baclofen(2x/day), and tramidol(2x/day). She feels like pain management makes a slight difference in her symptoms in that she feels a decreased \"pressure\" feeling and her sensitivity is also decreased. Despite the aforementioned, she is making some progress towards her goals. Her cervical rotation has greatly improved since her initial evaluation and her NDI score improved by 2 points. She only recently received components of her HEP and so far has been consistent. It was very recent that is was discovered that she can tolerate more activity. It would be beneficial for Maribel to continue therapy with this new, more active approach however she only has 1 more authorized visit.       PLAN: Maribel would benefit from continuing therapy 2x/week for at least 1 more month to focus on postural strengthening to decrease strain on her cervical muscles, alleviating symptoms.     Signature: Leslie Magdaleno PTA    "

## 2021-06-15 NOTE — PROGRESS NOTES
Progress Note Addendum      Patient: Maribel Whitaker           : 1975  Visit Date: 2021  Referring practitioner: Miguel Mckenzie DO  Date of Initial Visit: Type: THERAPY  Noted: 2021  Patient seen for 9 sessions  Visit Diagnoses:    ICD-10-CM ICD-9-CM   1. Radiculopathy, cervical  M54.12 723.4   2. Cervical pain  M54.2 723.1       PT G-Codes  Outcome Measure Options: Neck Disability Index (NDI)  Neck Disability Index Score: 32  Clinical Progress: improved  Home Program Compliance: Yes  Treatment has included: therapeutic exercise and manual therapy  Progress toward previous goals: Partially Met  Prognosis to achieve goals: good    Subjective     Objective     Assessment & Plan     Assessment  Impairments: abnormal muscle firing, abnormal muscle tone, abnormal or restricted ROM, activity intolerance, impaired physical strength, lacks appropriate home exercise program and pain with function  Prognosis: good  Functional Limitations: uncomfortable because of pain, sitting and unable to perform repetitive tasks  Plan  Therapy options: will be seen for skilled physical therapy services  Planned modality interventions: dry needling, low level laser therapy, TENS and traction  Planned therapy interventions: manual therapy, neuromuscular re-education, spinal/joint mobilization, home exercise program, body mechanics training, stretching, therapeutic activities, strengthening, soft tissue mobilization and postural training  Frequency: 2x week  Duration in visits: 8  Treatment plan discussed with: PTA        I have reviewed the progress note information provided by Jessy Magdaleno PTA, and I concur with the findings.    Dedrick Fatima, PT  Physical Therapist

## 2021-06-16 ENCOUNTER — TREATMENT (OUTPATIENT)
Dept: PHYSICAL THERAPY | Facility: CLINIC | Age: 46
End: 2021-06-16

## 2021-06-16 DIAGNOSIS — M54.12 RADICULOPATHY, CERVICAL: Primary | ICD-10-CM

## 2021-06-16 DIAGNOSIS — M54.2 CERVICAL PAIN: ICD-10-CM

## 2021-06-16 PROCEDURE — 97110 THERAPEUTIC EXERCISES: CPT | Performed by: PHYSICAL THERAPIST

## 2021-06-16 PROCEDURE — 97140 MANUAL THERAPY 1/> REGIONS: CPT | Performed by: PHYSICAL THERAPIST

## 2021-06-16 NOTE — PROGRESS NOTES
"Physical Therapy Treatment Note    Patient: Maribel Whitaker                                                                                     Visit Date: 2021  :     1975    Referring practitioner:    Miguel Mckenzie DO  Date of Initial Visit:          Type: THERAPY  Noted: 2021    Patient seen for 10 sessions    Visit Diagnoses:    ICD-10-CM ICD-9-CM   1. Radiculopathy, cervical  M54.12 723.4   2. Cervical pain  M54.2 723.1     SUBJECTIVE     Subjective Today is a good day. She can move her head w/out pain. She denies any symptoms from the pinched nerve over her shoulder blade. Her pain is over her \"hump\". She had a little bit of a head ache yesterday but not bad. She only has a very slight pressure today, not really a head ache.       PAIN: pre 2/10 < 3/10     OBJECTIVE     Objective      Therapeutic Exercises    15095 Comments   Seated UE phasic 2x10   Standing TB rows w/ yellow theratube 2x10   B SL ER to neutral 2x10   Towel roll pec stretch    Printed and reviewed HEP    Timed Minutes 20     Manual Therapy     66747  Comments   STM w/ massage cream to B UT/LS and suboccipitals  In massage chair; VERY gentle   Thoracic extension mobilizations In massage chair; VERY gentle   IASTM w/ pink foam roller to thoracic    Timed Minutes 25     Therapy Education/Self Care 52022   Details: Bolstered HEP: UE phasic, TB rows, doorway pec stretch, and towel roll pec stretch   Given Home Exercise Program  [employer] HEP (access code [email or web address])  postural retraining   Progress: Reinforced   Education provided to:  Patient   Level of understanding Verbalized   Timed Minutes        Total Timed Treatment:     45   mins  Total Time of Visit:             45   mins         ASSESSMENT/PLAN     GOALS        Goals                                                    Progress Note due by 21                                                                  Re-cert due by: 8/10/21    STG by: 3 weeks Comments Status " Date Last Assessed   Improve mobility through thoracic and CT junction Very tender to OP especially over CT  Ongoing  06/14/2021   Decreased muscle guarding  throughout neck and shoulder girdle Pt presented w/ slight guarding in her B suboccipitals Ongoing 06/14/2021   LTG by: 6 weeks        Reports no radicular symptoms for a week Pt reports daily radicular symptoms. Occurrence on her R side more. Instances happen about 10x a day.     If she tries to perform fine-turned activities, she can't feel her finger tips. The more she uses her hands, her hands will jerk.     She likes coloring but could only color 5-10 minutes. If she doesn't squeeze hard, she can control it but it doesn't last long.  Ongoing 06/14/2021   Improve cervical rotation josef to 60 deg with no pain R 33 degrees (20 previously)  L 34 degrees (30 previously) Progressing 06/14/2021   Report no neck/shoulder pain except occasional minor twinges no more than 2-3/10 At best, her pain decreases to a 4/10 with rest, medicine, and heat. Her pain is constant.  Ongoing 06/14/2021   Understands improved ergonomics for work and HEP for flexibility and posture Her HEP  Was bolstered this date.     She performs HEP daily.  Progressing 06/16/2021    Improve NDI score to 20% or less 32 today (36 previously) Progressing 06/14/2021   Able to elevate her shoulders josef to 130 deg actively R 59 degrees  L 76 degrees  Limited by pain Ongoing 06/14/2021       Assessment/Plan     ASSESSMENT: Today we were able to progress postural strengthening and bolstered her HEP to reflect. Ending treatment w/ manual therapy was preferable. I was able to increase the amount of OP today and she had increased guarding.     PLAN: Continue beginning treatment more actively and ending w/ manual treatment. Focus on postural strength and endurance.     Signature: Leslie Magdaleno, PTA

## 2021-06-21 ENCOUNTER — TREATMENT (OUTPATIENT)
Dept: PHYSICAL THERAPY | Facility: CLINIC | Age: 46
End: 2021-06-21

## 2021-06-21 DIAGNOSIS — M54.12 RADICULOPATHY, CERVICAL: Primary | ICD-10-CM

## 2021-06-21 DIAGNOSIS — M54.2 CERVICAL PAIN: ICD-10-CM

## 2021-06-21 PROCEDURE — 97140 MANUAL THERAPY 1/> REGIONS: CPT | Performed by: PHYSICAL THERAPIST

## 2021-06-21 PROCEDURE — 97110 THERAPEUTIC EXERCISES: CPT | Performed by: PHYSICAL THERAPIST

## 2021-06-21 NOTE — PROGRESS NOTES
Physical Therapy Treatment Note    Patient: Maribel Whitaker                                                                                     Visit Date: 2021  :     1975    Referring practitioner:    Miguel Mckenzie DO  Date of Initial Visit:          Type: THERAPY  Noted: 2021    Patient seen for 11 sessions    Visit Diagnoses:    ICD-10-CM ICD-9-CM   1. Radiculopathy, cervical  M54.12 723.4   2. Cervical pain  M54.2 723.1     SUBJECTIVE     Subjective She is feeling pretty good today. She had a headache that lasted 5 days. She denies soreness since last visit but did have some tightness in her neck. Pt reports performing HEP at least 1x/day.     PAIN: pre 310 < 4/10     OBJECTIVE     Objective      Therapeutic Exercises    41150 Comments   B ER TB walkouts against yellow theratube 2x10   B TRX rows 2x10   B SA BOSU rocking 2x10   B SL openbooks 2x10; R side felt a little more stretch   Towel roll pec stretch 2x10       Timed Minutes 30     Manual Therapy     02251  Comments   STM w/ massage cream to B UT/LS and suboccipitals  In massage chair; VERY gentle   Thoracic extension mobilizations In massage chair; VERY gentle   IASTM w/ white foam roller to thoracic paraspinals In massage chair; VERY gentle and not directly over spinous processes    Timed Minutes 15     Therapy Education/Self Care 91512   Details:    Given Home Exercise Program  [employer] HEP (access code [email or web address])  postural retraining   Progress: Reinforced   Education provided to:  Patient   Level of understanding Verbalized   Timed Minutes        Total Timed Treatment:     45   mins  Total Time of Visit:             45   mins         ASSESSMENT/PLAN     GOALS        Goals                                                    Progress Note due by 21                                                                  Re-cert due by: 8/10/21    STG by: 3 weeks Comments Status Date Last Assessed   Improve mobility through  thoracic and CT junction Very tender to OP especially over CT  Ongoing  06/14/2021   Decreased muscle guarding  throughout neck and shoulder girdle Pt presented w/ slight guarding in her B suboccipitals Ongoing 06/14/2021   LTG by: 6 weeks        Reports no radicular symptoms for a week Pt reports daily radicular symptoms. Occurrence on her R side more. Instances happen about 10x a day.     If she tries to perform fine-turned activities, she can't feel her finger tips. The more she uses her hands, her hands will jerk.     She likes coloring but could only color 5-10 minutes. If she doesn't squeeze hard, she can control it but it doesn't last long.  Ongoing 06/14/2021   Improve cervical rotation josef to 60 deg with no pain R 33 degrees (20 previously)  L 34 degrees (30 previously) Progressing 06/14/2021   Report no neck/shoulder pain except occasional minor twinges no more than 2-3/10 At best, her pain decreases to a 4/10 with rest, medicine, and heat. Her pain is constant.  Ongoing 06/14/2021   Understands improved ergonomics for work and HEP for flexibility and posture Her HEP  Was bolstered this date.     She performs HEP daily.  Progressing 06/16/2021    Improve NDI score to 20% or less 32 today (36 previously) Progressing 06/14/2021   Able to elevate her shoulders josef to 130 deg actively R 59 degrees  L 76 degrees  Limited by pain Ongoing 06/14/2021       Assessment/Plan     ASSESSMENT: We continued to progress postural strength and she was able to tolerate it w/out significant increase in symptoms. She was also able to tolerate increased OP during manual treatment today. She did have some guarding bilaterally and her thoracic and CT remain hypomobile.     PLAN: Continue to progress postural strength and endurance and bolster HEP to reflect.     Signature: Leslie Magdaleno, PTA

## 2021-06-25 ENCOUNTER — TREATMENT (OUTPATIENT)
Dept: PHYSICAL THERAPY | Facility: CLINIC | Age: 46
End: 2021-06-25

## 2021-06-25 ENCOUNTER — TELEPHONE (OUTPATIENT)
Dept: INTERNAL MEDICINE | Facility: CLINIC | Age: 46
End: 2021-06-25

## 2021-06-25 DIAGNOSIS — G89.29 CHRONIC NECK PAIN: ICD-10-CM

## 2021-06-25 DIAGNOSIS — M54.12 RADICULOPATHY, CERVICAL: Primary | ICD-10-CM

## 2021-06-25 DIAGNOSIS — M54.2 CHRONIC NECK PAIN: ICD-10-CM

## 2021-06-25 DIAGNOSIS — M54.12 CERVICAL RADICULOPATHY: Primary | ICD-10-CM

## 2021-06-25 DIAGNOSIS — M54.2 CERVICAL PAIN: ICD-10-CM

## 2021-06-25 PROCEDURE — 97140 MANUAL THERAPY 1/> REGIONS: CPT | Performed by: PHYSICAL THERAPIST

## 2021-06-25 PROCEDURE — 97110 THERAPEUTIC EXERCISES: CPT | Performed by: PHYSICAL THERAPIST

## 2021-06-25 NOTE — TELEPHONE ENCOUNTER
[facility] FROM [facility] IS NEEDING AN EXTENDED ORDER FOR THERAPY FOR PATIENT FOR INSURANCE PURPOSES.   PLEASE SIGN ORDER THAT NIA HAS PUT IN FOR MORE VISITS FOR PATIENT.

## 2021-06-25 NOTE — PROGRESS NOTES
"Physical Therapy Treatment Note    Patient: Maribel Whitaker                                                                                     Visit Date: 2021  :     1975    Referring practitioner:    Miguel Mckenzie DO  Date of Initial Visit:          Type: THERAPY  Noted: 2021    Patient seen for 12 sessions    Visit Diagnoses:    ICD-10-CM ICD-9-CM   1. Radiculopathy, cervical  M54.12 723.4   2. Cervical pain  M54.2 723.1     SUBJECTIVE     Subjective She is feeling \"pretty good\" today, a little sleepy. She denies pain today, just pressure and stiffness. She had a headache that lasted 3 days but is no longer there. Pt performs HEP daily sometimes twice. Pt felt stiffness, pressure, and pulling following last visit.     PAIN: pre 0/10 < 5/10     OBJECTIVE     Objective      Therapeutic Exercises    04731 Comments   Supine isometric B UE extensions onto the table 2x10   B SA punches w/ 1 lb weight 2x10   B SL ER w/ 1 lb weight 2x10   Printed and reviewed new HEP components 2x10       Timed Minutes 30     Manual Therapy     07392  Comments   STM w/ massage cream to B UT/LS and suboccipitals  In massage chair; VERY gentle   Thoracic extension mobilizations In massage chair; VERY gentle   IASTM w/ pink foam roller to thoracic paraspinals In massage chair; VERY gentle and not directly over spinous processes    Timed Minutes 15     Therapy Education/Self Care 91464   Details:    Given Home Exercise Program  [employer] HEP (access code [email or web address])  postural retraining   Progress: Reinforced   Education provided to:  Patient   Level of understanding Verbalized   Timed Minutes        Total Timed Treatment:     45   mins  Total Time of Visit:             45   mins         ASSESSMENT/PLAN     GOALS        Goals                                                    Progress Note due by 21                                                                  Re-cert due by: 8/10/21    STG by: 3 weeks " Comments Status Date Last Assessed   Improve mobility through thoracic and CT junction Very tender to OP especially over CT  Ongoing  06/14/2021   Decreased muscle guarding  throughout neck and shoulder girdle Pt presented w/ slight guarding in her B suboccipitals Ongoing 06/14/2021   LTG by: 6 weeks        Reports no radicular symptoms for a week Pt reports daily radicular symptoms. Occurrence on her R side more. Instances happen about 10x a day.     If she tries to perform fine-turned activities, she can't feel her finger tips. The more she uses her hands, her hands will jerk.     She likes coloring but could only color 5-10 minutes. If she doesn't squeeze hard, she can control it but it doesn't last long.  Ongoing 06/14/2021   Improve cervical rotation josef to 60 deg with no pain R 33 degrees (20 previously)  L 34 degrees (30 previously) Progressing 06/14/2021   Report no neck/shoulder pain except occasional minor twinges no more than 2-3/10 At best, her pain decreases to a 4/10 with rest, medicine, and heat. Her pain is constant.  Ongoing 06/14/2021   Understands improved ergonomics for work and HEP for flexibility and posture Her HEP  Was bolstered this date.     She performs HEP daily.  Progressing 06/25/2021    Improve NDI score to 20% or less 32 today (36 previously) Progressing 06/14/2021   Able to elevate her shoulders josef to 130 deg actively R 59 degrees  L 76 degrees  Limited by pain Ongoing 06/14/2021       Assessment/Plan     ASSESSMENT: We are unsure of the status of her insurance [employer], therefore, her HEP was bolstered this date. We conservatively continued to work to increase her postural strength and she did not report any increase in pain. It wasn't until conclusion of session, when she admitted an increase in pain and stated it was IASTM that hurt.     PLAN: Wait to see if more visits become approved from insurance.     Signature: Leslie Magdaleno, PTA

## 2021-07-09 ENCOUNTER — TREATMENT (OUTPATIENT)
Dept: PHYSICAL THERAPY | Facility: CLINIC | Age: 46
End: 2021-07-09

## 2021-07-09 DIAGNOSIS — M54.2 CERVICAL PAIN: ICD-10-CM

## 2021-07-09 DIAGNOSIS — M54.12 RADICULOPATHY, CERVICAL: Primary | ICD-10-CM

## 2021-07-09 PROCEDURE — 97110 THERAPEUTIC EXERCISES: CPT | Performed by: PHYSICAL THERAPIST

## 2021-07-09 NOTE — PROGRESS NOTES
Physical Therapy Treatment Note    Patient: Maribel Whitaker                                                                                     Visit Date: 2021  :     1975    Referring practitioner:    Miguel Mckenzie DO  Date of Initial Visit:          Type: THERAPY  Noted: 2021    Patient seen for 13 sessions    Visit Diagnoses:    ICD-10-CM ICD-9-CM   1. Radiculopathy, cervical  M54.12 723.4   2. Cervical pain  M54.2 723.1     SUBJECTIVE     Subjective She reports she has had a few HA's since the last session and the HA frequency and quality has been unchanged since starting P.T. She reports pressure in her neck with some burning aching as well.     PAIN: pre 5/10 post 5/10         OBJECTIVE     Objective      Therapeutic Exercises    55415 Comments   B unilateral no no's with red T bar 3 x 30 sec each    Seated wring outs with blue T bar    B unilateral modified Thor's Hammer Press x 30 sec x 3 each    B unilateral SA punches w/ 2 lb weight 2 x 10    Supine tummy tucks x 10    Timed Minutes 40       Therapy Education/Self Care 39609   Details:    Given postural retraining   Progress: Reinforced   Education provided to:  Patient   Level of understanding Verbalized   Timed Minutes          Total Timed Treatment:     40   mins  Total Time of Visit:             40   mins         ASSESSMENT/PLAN     GOALS    Goals                                                    Progress Note due by 21                                                                  Re-cert due by: 8/10/21     STG by: 3 weeks Comments Status Date Last Assessed   Improve mobility through thoracic and CT junction Very tender to OP especially over CT  Ongoing  2021   Decreased muscle guarding  throughout neck and shoulder girdle Pt presented w/ slight guarding in her B suboccipitals Ongoing 2021   LTG by: 6 weeks         Reports no radicular symptoms for a week reported N/T in R hand today Ongoing 21    Improve cervical rotation josef to 60 deg with no pain R 33 degrees (20 previously)  L 34 degrees (30 previously) Progressing 06/14/2021   Report no neck/shoulder pain except occasional minor twinges no more than 2-3/10 At best, her pain decreases to a 4/10 with rest, medicine, and heat. Her pain is constant.  Ongoing 06/14/2021   Understands improved ergonomics for work and HEP for flexibility and posture Her HEP  Was bolstered this date.      She performs HEP daily.  Progressing 06/25/2021    Improve NDI score to 20% or less 32 today (36 previously) Progressing 06/14/2021   Able to elevate her shoulders josef to 130 deg actively R 59 degrees  L 76 degrees  Limited by pain Ongoing 06/14       Assessment/Plan     ASSESSMENT: I continued to have her perform therex activities with her B UE followed by short frequent rest breaks and encouragement of movement. She was able to tolerate a progression of more resistance today as well.     PLAN: Review all goals for the progress note and continue postural awareness activities.    Signature: Juan Carlos Lewis, PTA

## 2021-07-13 ENCOUNTER — TREATMENT (OUTPATIENT)
Dept: PHYSICAL THERAPY | Facility: CLINIC | Age: 46
End: 2021-07-13

## 2021-07-13 DIAGNOSIS — M54.2 CERVICAL PAIN: ICD-10-CM

## 2021-07-13 DIAGNOSIS — M54.12 RADICULOPATHY, CERVICAL: Primary | ICD-10-CM

## 2021-07-13 PROCEDURE — 97110 THERAPEUTIC EXERCISES: CPT | Performed by: PHYSICAL THERAPIST

## 2021-07-13 NOTE — PROGRESS NOTES
Physical Therapy 30 Day Progress Note    Patient: Maribel Whitaker                                                                                     Visit Date: 2021  :     1975    Referring practitioner:    Miguel Mckenzie DO  Date of Initial Visit:          Type: THERAPY  Noted: 2021    Patient seen for 14 sessions    Visit Diagnoses:    ICD-10-CM ICD-9-CM   1. Radiculopathy, cervical  M54.12 723.4   2. Cervical pain  M54.2 723.1     SUBJECTIVE     Subjective She reports has been good since her last session no symptom change. She reports a 30% improvement with P.T thus far    PAIN:  3/10    PT G-Codes  Outcome Measure Options: Neck Disability Index (NDI)  Neck Disability Index Score: 30    OBJECTIVE     Objective      Therapeutic Exercises    79401 Comments   Assessed B shoulder flexion and cervical rotation AROM (see goals section)    Seated UE phasic with yellow T band x 15    B unilateral rows with yellow T band in standing x 15    Payloff Press @ [employer] #1 x 15    Progressive thoracic stretch on towel roll    Scifit seat# 7 res 1            Timed Minutes 40       Therapy Education/Self Care 09124   Details:    Given Home Exercise Program   Progress: Reinforced   Education provided to:  Patient   Level of understanding Verbalized   Timed Minutes          Total Timed Treatment:     40   mins  Total Time of Visit:             40   mins         ASSESSMENT/PLAN     GOALS  Goals                                                    Progress Note due by 21                                                                  Re-cert due by: 8/10/21     STG by: 3 weeks Comments Status Date Last Assessed   Improve mobility through thoracic and CT junction Still slight kyphosis Ongoing  21   Decreased muscle guarding  throughout neck and shoulder girdle Point tenderness B UT Ongoing 21   LTG by: 6 weeks       Reports no radicular symptoms for a week She reports B UE with no change and  constant Ongoing 7/13/21   Improve cervical rotation josef to 60 deg with no pain L 56 degrees R 59 degrees AROM  7/13/21   Report no neck/shoulder pain except occasional minor twinges no more than 2-3/10 3/10 today Ongoing 7/13/21   Understands improved ergonomics for work and HEP for flexibility and posture reinforced today Progressing 7/13/21    Improve NDI score to 20% or less 60% today Progressing 7/13/21   Able to elevate her shoulders josef to 130 deg actively R shoulder [id number] degrees, L 90 degrees AROM  Ongoing 7/13/21         Assessment/Plan     ASSESSMENT: She has made a two point improvement in her NDI score as compared to 6/14/21 entry. She has also improved her cervical rotation AROM and nearly met the goal today. I am a bit perplexed by her B shoulder flexion AROM and she didn't wence or exhibit visual signs of increased discomfort. She reports a thirty percent improvement with P.T. to this point.    PLAN: Continue to focus on increasing her active activity and slowly eliminate passive interventions. Also focus on patient encouragement and education especially in regard to posture.    Signature: Juan Carlos Lewis, PTA

## 2021-07-13 NOTE — PROGRESS NOTES
Progress Note Addendum      Patient: Maribel Whitaker           : 1975  Visit Date: 2021  Referring practitioner: Miguel Mckenzie DO  Date of Initial Visit: Type: THERAPY  Noted: 2021  Patient seen for 14 sessions  Visit Diagnoses:    ICD-10-CM ICD-9-CM   1. Radiculopathy, cervical  M54.12 723.4   2. Cervical pain  M54.2 723.1       PT G-Codes  Outcome Measure Options: Neck Disability Index (NDI)  Neck Disability Index Score: 30  Clinical Progress: improved  Home Program Compliance: Yes  Treatment has included: therapeutic exercise and manual therapy  Progress toward previous goals: Partially Met  Prognosis to achieve goals: good    Subjective     Objective     Assessment & Plan     Assessment  Impairments: abnormal muscle firing, abnormal muscle tone, abnormal or restricted ROM, activity intolerance, impaired physical strength, lacks appropriate home exercise program and pain with function  Prognosis: good  Functional Limitations: uncomfortable because of pain, sitting and unable to perform repetitive tasks  Plan  Therapy options: will be seen for skilled physical therapy services  Planned modality interventions: dry needling, low level laser therapy, TENS and traction  Planned therapy interventions: manual therapy, neuromuscular re-education, spinal/joint mobilization, home exercise program, body mechanics training, stretching, therapeutic activities, strengthening, soft tissue mobilization and postural training  Frequency: 2x week  Duration in visits: 8  Treatment plan discussed with: PTA        I have reviewed the progress note information provided by Juan Carlos Lewis PTA, and I concur with the findings.    Dedrick Fatima, PT  Physical Therapist

## 2021-07-19 ENCOUNTER — TREATMENT (OUTPATIENT)
Dept: PHYSICAL THERAPY | Facility: CLINIC | Age: 46
End: 2021-07-19

## 2021-07-19 DIAGNOSIS — M54.12 RADICULOPATHY, CERVICAL: Primary | ICD-10-CM

## 2021-07-19 DIAGNOSIS — M54.2 CERVICAL PAIN: ICD-10-CM

## 2021-07-19 PROCEDURE — 97140 MANUAL THERAPY 1/> REGIONS: CPT | Performed by: PHYSICAL THERAPIST

## 2021-07-19 PROCEDURE — 97110 THERAPEUTIC EXERCISES: CPT | Performed by: PHYSICAL THERAPIST

## 2021-07-19 NOTE — PROGRESS NOTES
"Physical Therapy Treatment Note    Patient: Maribel Whitaker                                                                                     Visit Date: 2021  :     1975    Referring practitioner:    No ref. provider found  Date of Initial Visit:          Type: THERAPY  Noted: 2021    Patient seen for 15 sessions    Visit Diagnoses:    ICD-10-CM ICD-9-CM   1. Radiculopathy, cervical  M54.12 723.4   2. Cervical pain  M54.2 723.1     SUBJECTIVE     Subjective She reports a HA and pain running down her back     PAIN: pre  7/10         OBJECTIVE     Objective      Manual Therapy     78733  Comments   STM mid thoracic region with ratchet roller Massage chair   Thoracic extension mobilizations Grades 1 and 2 Massage chair               Timed Minutes 25        Therapeutic Exercises    66525 Comments   HL \"t's\" with red T band 2 x 10    HL \"y's\" with red T band 2 x 10    HL SB press downs with 45 cm SB x 12            Timed Minutes 16       Therapy Education/Self Care 87578   Details:    Given postural retraining   Progress: Reinforced   Education provided to:  Patient   Level of understanding Verbalized   Timed Minutes          Total Timed Treatment:     41   mins  Total Time of Visit:             41   mins         ASSESSMENT/PLAN     GOALS  Goals                                                    Progress Note due by 21                                                                  Re-cert due by: 8/10/21     STG by: 3 weeks Comments Status Date Last Assessed   Improve mobility through thoracic and CT junction Still slight kyphosis Ongoing  21   Decreased muscle guarding  throughout neck and shoulder girdle Point tenderness B UT Ongoing 21   LTG by: 6 weeks         Reports no radicular symptoms for a week She reports B UE with no change and constant Ongoing 21   Improve cervical rotation josef to 60 deg with no pain L 56 degrees R 59 degrees AROM   21   Report no neck/shoulder " pain except occasional minor twinges no more than 2-3/10 3/10 today Ongoing 7/13/21   Understands improved ergonomics for work and HEP for flexibility and posture reinforced today Progressing 7/13/21    Improve NDI score to 20% or less 60% today Progressing 7/13/21   Able to elevate her shoulders josef to 130 deg actively R shoulder [id number] degrees, L 90 degrees AROM  Ongoing 7/13/21         Assessment/Plan     ASSESSMENT: She reported increased pain with more diffuse symptoms this date. She is able to be more active with therex activities during P.T.sessions but ultimately her pain symptoms have had little resolution to date.    PLAN: Continue to emphasize postural awareness as her posture is poor.    Signature: Juan Carlos Lewis, PTA

## 2021-07-22 ENCOUNTER — TREATMENT (OUTPATIENT)
Dept: PHYSICAL THERAPY | Facility: CLINIC | Age: 46
End: 2021-07-22

## 2021-07-22 DIAGNOSIS — M54.2 CERVICAL PAIN: ICD-10-CM

## 2021-07-22 DIAGNOSIS — M54.12 RADICULOPATHY, CERVICAL: Primary | ICD-10-CM

## 2021-07-22 PROCEDURE — 97140 MANUAL THERAPY 1/> REGIONS: CPT | Performed by: PHYSICAL THERAPIST

## 2021-07-22 PROCEDURE — 97110 THERAPEUTIC EXERCISES: CPT | Performed by: PHYSICAL THERAPIST

## 2021-07-22 NOTE — PROGRESS NOTES
Physical Therapy Treatment Note    Patient: Maribel Whitaker                                                                                     Visit Date: 2021  :     1975    Referring practitioner:    Miguel Mckenzie DO  Date of Initial Visit:          Type: THERAPY  Noted: 2021    Patient seen for 16 sessions    Visit Diagnoses:    ICD-10-CM ICD-9-CM   1. Radiculopathy, cervical  M54.12 723.4   2. Cervical pain  M54.2 723.1     SUBJECTIVE     Subjective She reports N/T in her B UE and LE    PAIN: pre 4/10         OBJECTIVE     Objective      Therapeutic Exercises    50233 Comments   DTR's B LE and B bicep and radial Slight hyporeflexive   Scifit seat# 7 res 1.5 3 min fwd/bwd each    B unilateral UE D1 and D2 with #1 standing against 1/2 bolster against wall x 20 each            Timed Minutes 27      Manual Therapy     87090  Comments   Gentle STM B UT and cervical paraspinals Sitting with B UE on piollows                   Timed Minutes 15          Therapy Education/Self Care 95321   Details:    Given postural retraining   Progress: Reinforced   Education provided to:  Patient   Level of understanding Verbalized, Demonstrated and Continued reinforcement needed   Timed Minutes          Total Timed Treatment:     42   mins  Total Time of Visit:             42   mins         ASSESSMENT/PLAN     GOALS    Goals                                                    Progress Note due by 21                                                                  Re-cert due by: 8/10/21     STG by: 3 weeks Comments Status Date Last Assessed   Improve mobility through thoracic and CT junction Still slight kyphosis Ongoing  21   Decreased muscle guarding  throughout neck and shoulder girdle Point tenderness B UT Ongoing 21   LTG by: 6 weeks         Reports no radicular symptoms for a week She reports B UE with no change and constant Ongoing 21   Improve cervical rotation josef to 60 deg with no  pain L 56 degrees R 59 degrees AROM   7/13/21   Report no neck/shoulder pain except occasional minor twinges no more than 2-3/10 4/10 today Ongoing 7/22/21   Understands improved ergonomics for work and HEP for flexibility and posture reinforced today Progressing 7/13/21    Improve NDI score to 20% or less 60% today Progressing 7/13/21   Able to elevate her shoulders josef to 130 deg actively R shoulder [id number] degrees, L 90 degrees AROM  Ongoing 7/13/21       Assessment/Plan     ASSESSMENT: Ultimately her symptoms have been unchanged with P.T. and due to her presentation, symptom reports, and assessments we have performed throughout her POC I'm beginning to suspect that she has fibromyalgia. I advised her today to discuss this with her PCP and arrange an appointment to do so as soon as she can.    PLAN: Review all her POC goals and likely place on hold.    Signature: Juan Carlos Lewis, PTA

## 2021-07-23 ENCOUNTER — TRANSCRIBE ORDERS (OUTPATIENT)
Dept: ADMINISTRATIVE | Facility: HOSPITAL | Age: 46
End: 2021-07-23

## 2021-07-23 ENCOUNTER — LAB (OUTPATIENT)
Dept: LAB | Facility: HOSPITAL | Age: 46
End: 2021-07-23

## 2021-07-23 DIAGNOSIS — D50.8 OTHER IRON DEFICIENCY ANEMIA: ICD-10-CM

## 2021-07-23 DIAGNOSIS — R73.02 IMPAIRED GLUCOSE TOLERANCE: ICD-10-CM

## 2021-07-23 DIAGNOSIS — E55.9 VITAMIN D DEFICIENCY: ICD-10-CM

## 2021-07-23 DIAGNOSIS — I10 ESSENTIAL HYPERTENSION: ICD-10-CM

## 2021-07-23 DIAGNOSIS — E56.9 VITAMIN DEFICIENCY, UNSPECIFIED: ICD-10-CM

## 2021-07-23 DIAGNOSIS — Z00.01 ANNUAL VISIT FOR GENERAL ADULT MEDICAL EXAMINATION WITH ABNORMAL FINDINGS: ICD-10-CM

## 2021-07-23 DIAGNOSIS — D51.9 ANEMIA DUE TO VITAMIN B12 DEFICIENCY, UNSPECIFIED B12 DEFICIENCY TYPE: ICD-10-CM

## 2021-07-23 DIAGNOSIS — D50.9 IRON DEFICIENCY ANEMIA, UNSPECIFIED IRON DEFICIENCY ANEMIA TYPE: Primary | ICD-10-CM

## 2021-07-23 LAB
ALBUMIN SERPL-MCNC: 4.5 G/DL (ref 3.5–5.2)
ALBUMIN/GLOB SERPL: 2 G/DL
ALP SERPL-CCNC: 89 U/L (ref 39–117)
ALT SERPL W P-5'-P-CCNC: 11 U/L (ref 1–33)
ANION GAP SERPL CALCULATED.3IONS-SCNC: 9 MMOL/L (ref 5–15)
ANISOCYTOSIS BLD QL: ABNORMAL
AST SERPL-CCNC: 14 U/L (ref 1–32)
BASOPHILS # BLD MANUAL: 0.18 10*3/MM3 (ref 0–0.2)
BASOPHILS NFR BLD AUTO: 2 % (ref 0–1.5)
BILIRUB SERPL-MCNC: 0.3 MG/DL (ref 0–1.2)
BUN SERPL-MCNC: 14 MG/DL (ref 6–20)
BUN/CREAT SERPL: 18.9 (ref 7–25)
CALCIUM SPEC-SCNC: 9.2 MG/DL (ref 8.6–10.5)
CHLORIDE SERPL-SCNC: 105 MMOL/L (ref 98–107)
CO2 SERPL-SCNC: 26 MMOL/L (ref 22–29)
CREAT SERPL-MCNC: 0.74 MG/DL (ref 0.57–1)
DEPRECATED RDW RBC AUTO: 43.9 FL (ref 37–54)
EOSINOPHIL # BLD MANUAL: 0.18 10*3/MM3 (ref 0–0.4)
EOSINOPHIL NFR BLD MANUAL: 2 % (ref 0.3–6.2)
ERYTHROCYTE [DISTWIDTH] IN BLOOD BY AUTOMATED COUNT: 19.5 % (ref 12.3–15.4)
GFR SERPL CREATININE-BSD FRML MDRD: 85 ML/MIN/1.73
GLOBULIN UR ELPH-MCNC: 2.2 GM/DL
GLUCOSE SERPL-MCNC: 100 MG/DL (ref 65–99)
HCT VFR BLD AUTO: 31.1 % (ref 34–46.6)
HGB BLD-MCNC: 9.1 G/DL (ref 12–15.9)
LYMPHOCYTES # BLD MANUAL: 2.69 10*3/MM3 (ref 0.7–3.1)
LYMPHOCYTES NFR BLD MANUAL: 1 % (ref 5–12)
LYMPHOCYTES NFR BLD MANUAL: 28 % (ref 19.6–45.3)
MCH RBC QN AUTO: 18.7 PG (ref 26.6–33)
MCHC RBC AUTO-ENTMCNC: 29.3 G/DL (ref 31.5–35.7)
MCV RBC AUTO: 64 FL (ref 79–97)
MICROCYTES BLD QL: ABNORMAL
MONOCYTES # BLD AUTO: 0.09 10*3/MM3 (ref 0.1–0.9)
NEUTROPHILS # BLD AUTO: 5.82 10*3/MM3 (ref 1.7–7)
NEUTROPHILS NFR BLD MANUAL: 65 % (ref 42.7–76)
PLAT MORPH BLD: NORMAL
PLATELET # BLD AUTO: 352 10*3/MM3 (ref 140–450)
PMV BLD AUTO: 9 FL (ref 6–12)
POIKILOCYTOSIS BLD QL SMEAR: ABNORMAL
POTASSIUM SERPL-SCNC: 3.6 MMOL/L (ref 3.5–5.2)
PROT SERPL-MCNC: 6.7 G/DL (ref 6–8.5)
RBC # BLD AUTO: 4.86 10*6/MM3 (ref 3.77–5.28)
SODIUM SERPL-SCNC: 140 MMOL/L (ref 136–145)
VARIANT LYMPHS NFR BLD MANUAL: 2 % (ref 0–5)
WBC # BLD AUTO: 8.95 10*3/MM3 (ref 3.4–10.8)
WBC MORPH BLD: NORMAL

## 2021-07-23 PROCEDURE — 80061 LIPID PANEL: CPT

## 2021-07-23 PROCEDURE — 80053 COMPREHEN METABOLIC PANEL: CPT | Performed by: CLINICAL NURSE SPECIALIST

## 2021-07-23 PROCEDURE — 83036 HEMOGLOBIN GLYCOSYLATED A1C: CPT

## 2021-07-23 PROCEDURE — 84425 ASSAY OF VITAMIN B-1: CPT | Performed by: CLINICAL NURSE SPECIALIST

## 2021-07-23 PROCEDURE — 82728 ASSAY OF FERRITIN: CPT

## 2021-07-23 PROCEDURE — 83540 ASSAY OF IRON: CPT

## 2021-07-23 PROCEDURE — 85025 COMPLETE CBC W/AUTO DIFF WBC: CPT | Performed by: CLINICAL NURSE SPECIALIST

## 2021-07-23 PROCEDURE — 36415 COLL VENOUS BLD VENIPUNCTURE: CPT | Performed by: CLINICAL NURSE SPECIALIST

## 2021-07-23 PROCEDURE — 82607 VITAMIN B-12: CPT | Performed by: CLINICAL NURSE SPECIALIST

## 2021-07-23 PROCEDURE — 85007 BL SMEAR W/DIFF WBC COUNT: CPT | Performed by: CLINICAL NURSE SPECIALIST

## 2021-07-23 PROCEDURE — 86803 HEPATITIS C AB TEST: CPT

## 2021-07-23 PROCEDURE — 84466 ASSAY OF TRANSFERRIN: CPT

## 2021-07-23 PROCEDURE — 82306 VITAMIN D 25 HYDROXY: CPT

## 2021-07-24 LAB
25(OH)D3 SERPL-MCNC: 27.4 NG/ML (ref 30–100)
CHOLEST SERPL-MCNC: 138 MG/DL (ref 0–200)
FERRITIN SERPL-MCNC: 3.89 NG/ML (ref 13–150)
HBA1C MFR BLD: 5.37 % (ref 4.8–5.6)
HCV AB SER DONR QL: NORMAL
HDLC SERPL-MCNC: 50 MG/DL (ref 40–60)
IRON 24H UR-MRATE: 21 MCG/DL (ref 37–145)
IRON SATN MFR SERPL: 4 % (ref 20–50)
LDLC SERPL CALC-MCNC: 72 MG/DL (ref 0–100)
LDLC/HDLC SERPL: 1.43 {RATIO}
TIBC SERPL-MCNC: 532 MCG/DL (ref 298–536)
TRANSFERRIN SERPL-MCNC: 357 MG/DL (ref 200–360)
TRIGL SERPL-MCNC: 83 MG/DL (ref 0–150)
VIT B12 BLD-MCNC: 663 PG/ML (ref 211–946)
VLDLC SERPL-MCNC: 16 MG/DL (ref 5–40)

## 2021-07-26 ENCOUNTER — TELEPHONE (OUTPATIENT)
Dept: ONCOLOGY | Facility: HOSPITAL | Age: 46
End: 2021-07-26

## 2021-07-26 ENCOUNTER — TREATMENT (OUTPATIENT)
Dept: PHYSICAL THERAPY | Facility: CLINIC | Age: 46
End: 2021-07-26

## 2021-07-26 DIAGNOSIS — D50.8 OTHER IRON DEFICIENCY ANEMIA: ICD-10-CM

## 2021-07-26 DIAGNOSIS — M54.2 CERVICAL PAIN: ICD-10-CM

## 2021-07-26 DIAGNOSIS — M54.12 RADICULOPATHY, CERVICAL: Primary | ICD-10-CM

## 2021-07-26 DIAGNOSIS — R73.02 IMPAIRED GLUCOSE TOLERANCE: Primary | ICD-10-CM

## 2021-07-26 DIAGNOSIS — E55.9 VITAMIN D DEFICIENCY: Primary | ICD-10-CM

## 2021-07-26 PROBLEM — D50.9 IRON DEFICIENCY ANEMIA: Status: ACTIVE | Noted: 2021-07-26

## 2021-07-26 PROBLEM — D64.9 ABSOLUTE ANEMIA: Status: ACTIVE | Noted: 2021-07-26

## 2021-07-26 PROCEDURE — 97140 MANUAL THERAPY 1/> REGIONS: CPT | Performed by: PHYSICAL THERAPIST

## 2021-07-26 RX ORDER — MULTIVIT-MIN/IRON/FOLIC ACID/K 18-600-40
2000 CAPSULE ORAL DAILY
Qty: 30 CAPSULE | Refills: 11 | Status: SHIPPED | OUTPATIENT
Start: 2021-07-26 | End: 2022-06-27

## 2021-07-26 RX ORDER — SODIUM CHLORIDE 9 MG/ML
250 INJECTION, SOLUTION INTRAVENOUS ONCE
Status: CANCELLED | OUTPATIENT
Start: 2021-08-02

## 2021-07-26 NOTE — PROGRESS NOTES
Physical Therapy Treatment Note    Patient: Maribel Whitaker                                                                                     Visit Date: 2021  :     1975    Referring practitioner:    Miguel Mckenzie DO  Date of Initial Visit:          Type: THERAPY  Noted: 2021    Patient seen for 17 sessions    Visit Diagnoses:    ICD-10-CM ICD-9-CM   1. Radiculopathy, cervical  M54.12 723.4   2. Cervical pain  M54.2 723.1     SUBJECTIVE     Subjective She just has a little pressure on the back of her neck. Her symptoms stay like this. She had her injections about 3 months ago and is almost due for them again.     PAIN: pre 5/10 pressure on the back of her neck. > felt better post-session     OBJECTIVE     Objective      Manual Therapy     03871  Comments   STM w/ massage cream to B UT/LS and suboccipitals  Massage chair   IASTM w/ pink foam roller to thoracic paraspinals No directly over spinous process   Thoracic extension mobilizations w/ towel roll  Massage chair   Supine cervical manual distraction w/ bilateral side bends sustained         Timed Minutes 40       Therapy Education/Self Care 30989   Details: Continue HEP    Given postural retraining   Progress: Reinforced   Education provided to:  Patient   Level of understanding Verbalized, Demonstrated and Continued reinforcement needed   Timed Minutes        Total Timed Treatment:     40   mins  Total Time of Visit:             40   mins         ASSESSMENT/PLAN     GOALS    Goals                                                    Progress Note due by 21                                                                  Re-cert due by: 8/10/21     STG by: 3 weeks Comments Status Date Last Assessed   Improve mobility through thoracic and CT junction Still slight kyphosis Ongoing  21   Decreased muscle guarding  throughout neck and shoulder girdle Point tenderness B UT Ongoing 21   LTG by: 6 weeks         Reports no  "radicular symptoms for a week She reports B UE with no change and constant Ongoing 7/13/21   Improve cervical rotation josef to 60 deg with no pain L 56 degrees R 59 degrees AROM   7/13/21   Report no neck/shoulder pain except occasional minor twinges no more than 2-3/10 5/10 today she describes as \"pressure\"  Ongoing 7/26/21   Understands improved ergonomics for work and HEP for flexibility and posture reinforced today Progressing 7/13/21    Improve NDI score to 20% or less 60% today Progressing 7/13/21   Able to elevate her shoulders josef to 130 deg actively R shoulder [id number] degrees, L 90 degrees AROM  Ongoing 7/13/21       Assessment/Plan     ASSESSMENT: Pt is agreeable to placing her POC on hold as her symptoms have somewhat improved however, she still has some pain. She reported confidence in her HEP and was encouraged to continue. She will soon be due for more injections and hopefully they will continue to help her as well.     PLAN: Place POC on hold 30 days.     Signature: Leslie Magdaleno PTA    "

## 2021-07-26 NOTE — TELEPHONE ENCOUNTER
I called and confirmed the Venofer scheduled infusions, with the patient.  She expressed understanding and agreed to the times.

## 2021-07-27 LAB — VIT B1 BLD-SCNC: 109.7 NMOL/L (ref 66.5–200)

## 2021-08-02 ENCOUNTER — INFUSION (OUTPATIENT)
Dept: ONCOLOGY | Facility: HOSPITAL | Age: 46
End: 2021-08-02

## 2021-08-02 VITALS
DIASTOLIC BLOOD PRESSURE: 42 MMHG | HEART RATE: 69 BPM | WEIGHT: 209 LBS | SYSTOLIC BLOOD PRESSURE: 101 MMHG | RESPIRATION RATE: 16 BRPM | HEIGHT: 62 IN | BODY MASS INDEX: 38.46 KG/M2 | TEMPERATURE: 97.7 F | OXYGEN SATURATION: 100 %

## 2021-08-02 DIAGNOSIS — D50.9 IRON DEFICIENCY ANEMIA, UNSPECIFIED IRON DEFICIENCY ANEMIA TYPE: Primary | ICD-10-CM

## 2021-08-02 PROCEDURE — 25010000002 IRON SUCROSE PER 1 MG: Performed by: INTERNAL MEDICINE

## 2021-08-02 PROCEDURE — 96365 THER/PROPH/DIAG IV INF INIT: CPT

## 2021-08-02 RX ORDER — SODIUM CHLORIDE 9 MG/ML
250 INJECTION, SOLUTION INTRAVENOUS ONCE
Status: CANCELLED | OUTPATIENT
Start: 2021-08-03

## 2021-08-02 RX ORDER — SODIUM CHLORIDE 9 MG/ML
250 INJECTION, SOLUTION INTRAVENOUS ONCE
Status: COMPLETED | OUTPATIENT
Start: 2021-08-02 | End: 2021-08-02

## 2021-08-02 RX ADMIN — IRON SUCROSE 200 MG: 20 INJECTION, SOLUTION INTRAVENOUS at 13:13

## 2021-08-02 RX ADMIN — SODIUM CHLORIDE 250 ML: 9 INJECTION, SOLUTION INTRAVENOUS at 13:09

## 2021-08-03 ENCOUNTER — INFUSION (OUTPATIENT)
Dept: ONCOLOGY | Facility: HOSPITAL | Age: 46
End: 2021-08-03

## 2021-08-03 VITALS
HEART RATE: 70 BPM | DIASTOLIC BLOOD PRESSURE: 48 MMHG | TEMPERATURE: 96.7 F | BODY MASS INDEX: 38.28 KG/M2 | HEIGHT: 62 IN | RESPIRATION RATE: 16 BRPM | WEIGHT: 208 LBS | OXYGEN SATURATION: 100 % | SYSTOLIC BLOOD PRESSURE: 122 MMHG

## 2021-08-03 DIAGNOSIS — D50.9 IRON DEFICIENCY ANEMIA, UNSPECIFIED IRON DEFICIENCY ANEMIA TYPE: Primary | ICD-10-CM

## 2021-08-03 PROCEDURE — 25010000002 IRON SUCROSE PER 1 MG: Performed by: INTERNAL MEDICINE

## 2021-08-03 PROCEDURE — 96365 THER/PROPH/DIAG IV INF INIT: CPT

## 2021-08-03 RX ORDER — SODIUM CHLORIDE 9 MG/ML
250 INJECTION, SOLUTION INTRAVENOUS ONCE
Status: CANCELLED | OUTPATIENT
Start: 2021-08-04

## 2021-08-03 RX ORDER — SODIUM CHLORIDE 9 MG/ML
250 INJECTION, SOLUTION INTRAVENOUS ONCE
Status: COMPLETED | OUTPATIENT
Start: 2021-08-03 | End: 2021-08-03

## 2021-08-03 RX ADMIN — IRON SUCROSE 200 MG: 20 INJECTION, SOLUTION INTRAVENOUS at 10:38

## 2021-08-03 RX ADMIN — SODIUM CHLORIDE 250 ML: 9 INJECTION, SOLUTION INTRAVENOUS at 10:38

## 2021-08-04 ENCOUNTER — INFUSION (OUTPATIENT)
Dept: ONCOLOGY | Facility: HOSPITAL | Age: 46
End: 2021-08-04

## 2021-08-04 VITALS
TEMPERATURE: 97.2 F | RESPIRATION RATE: 18 BRPM | SYSTOLIC BLOOD PRESSURE: 110 MMHG | OXYGEN SATURATION: 100 % | HEART RATE: 66 BPM | DIASTOLIC BLOOD PRESSURE: 61 MMHG

## 2021-08-04 DIAGNOSIS — D50.9 IRON DEFICIENCY ANEMIA, UNSPECIFIED IRON DEFICIENCY ANEMIA TYPE: Primary | ICD-10-CM

## 2021-08-04 PROCEDURE — 25010000002 IRON SUCROSE PER 1 MG: Performed by: INTERNAL MEDICINE

## 2021-08-04 PROCEDURE — 96365 THER/PROPH/DIAG IV INF INIT: CPT

## 2021-08-04 RX ORDER — SODIUM CHLORIDE 9 MG/ML
250 INJECTION, SOLUTION INTRAVENOUS ONCE
Status: CANCELLED | OUTPATIENT
Start: 2021-08-05

## 2021-08-04 RX ORDER — SODIUM CHLORIDE 9 MG/ML
250 INJECTION, SOLUTION INTRAVENOUS ONCE
Status: COMPLETED | OUTPATIENT
Start: 2021-08-04 | End: 2021-08-04

## 2021-08-04 RX ADMIN — SODIUM CHLORIDE 250 ML: 9 INJECTION, SOLUTION INTRAVENOUS at 10:28

## 2021-08-04 RX ADMIN — IRON SUCROSE 200 MG: 20 INJECTION, SOLUTION INTRAVENOUS at 10:41

## 2021-08-05 ENCOUNTER — INFUSION (OUTPATIENT)
Dept: ONCOLOGY | Facility: HOSPITAL | Age: 46
End: 2021-08-05

## 2021-08-05 VITALS
HEIGHT: 62 IN | OXYGEN SATURATION: 99 % | TEMPERATURE: 96.7 F | SYSTOLIC BLOOD PRESSURE: 123 MMHG | BODY MASS INDEX: 38.2 KG/M2 | RESPIRATION RATE: 16 BRPM | HEART RATE: 66 BPM | DIASTOLIC BLOOD PRESSURE: 60 MMHG | WEIGHT: 207.6 LBS

## 2021-08-05 DIAGNOSIS — D50.9 IRON DEFICIENCY ANEMIA, UNSPECIFIED IRON DEFICIENCY ANEMIA TYPE: Primary | ICD-10-CM

## 2021-08-05 PROCEDURE — 25010000002 IRON SUCROSE PER 1 MG: Performed by: INTERNAL MEDICINE

## 2021-08-05 PROCEDURE — 96365 THER/PROPH/DIAG IV INF INIT: CPT

## 2021-08-05 RX ORDER — SODIUM CHLORIDE 9 MG/ML
250 INJECTION, SOLUTION INTRAVENOUS ONCE
Status: COMPLETED | OUTPATIENT
Start: 2021-08-05 | End: 2021-08-05

## 2021-08-05 RX ORDER — SODIUM CHLORIDE 9 MG/ML
250 INJECTION, SOLUTION INTRAVENOUS ONCE
Status: CANCELLED | OUTPATIENT
Start: 2021-08-06

## 2021-08-05 RX ADMIN — IRON SUCROSE 200 MG: 20 INJECTION, SOLUTION INTRAVENOUS at 13:52

## 2021-08-05 RX ADMIN — SODIUM CHLORIDE 250 ML: 9 INJECTION, SOLUTION INTRAVENOUS at 13:52

## 2021-08-06 ENCOUNTER — INFUSION (OUTPATIENT)
Dept: ONCOLOGY | Facility: HOSPITAL | Age: 46
End: 2021-08-06

## 2021-08-06 VITALS
TEMPERATURE: 96.4 F | WEIGHT: 208 LBS | DIASTOLIC BLOOD PRESSURE: 60 MMHG | BODY MASS INDEX: 38.28 KG/M2 | OXYGEN SATURATION: 99 % | HEIGHT: 62 IN | RESPIRATION RATE: 16 BRPM | HEART RATE: 67 BPM | SYSTOLIC BLOOD PRESSURE: 122 MMHG

## 2021-08-06 DIAGNOSIS — D50.9 IRON DEFICIENCY ANEMIA, UNSPECIFIED IRON DEFICIENCY ANEMIA TYPE: Primary | ICD-10-CM

## 2021-08-06 PROCEDURE — 25010000002 IRON SUCROSE PER 1 MG: Performed by: INTERNAL MEDICINE

## 2021-08-06 PROCEDURE — 96365 THER/PROPH/DIAG IV INF INIT: CPT

## 2021-08-06 RX ORDER — SODIUM CHLORIDE 9 MG/ML
250 INJECTION, SOLUTION INTRAVENOUS ONCE
Status: CANCELLED | OUTPATIENT
Start: 2021-08-06

## 2021-08-06 RX ORDER — SODIUM CHLORIDE 9 MG/ML
250 INJECTION, SOLUTION INTRAVENOUS ONCE
Status: COMPLETED | OUTPATIENT
Start: 2021-08-06 | End: 2021-08-06

## 2021-08-06 RX ADMIN — SODIUM CHLORIDE 250 ML: 9 INJECTION, SOLUTION INTRAVENOUS at 10:35

## 2021-08-06 RX ADMIN — IRON SUCROSE 200 MG: 20 INJECTION, SOLUTION INTRAVENOUS at 10:36

## 2021-08-09 RX ORDER — ACETAMINOPHEN AND CODEINE PHOSPHATE 120; 12 MG/5ML; MG/5ML
SOLUTION ORAL
Qty: 28 TABLET | Refills: 3 | Status: SHIPPED | OUTPATIENT
Start: 2021-08-09 | End: 2021-11-24

## 2021-08-16 ENCOUNTER — OFFICE VISIT (OUTPATIENT)
Dept: INTERNAL MEDICINE | Facility: CLINIC | Age: 46
End: 2021-08-16

## 2021-08-16 VITALS
SYSTOLIC BLOOD PRESSURE: 134 MMHG | HEIGHT: 62 IN | BODY MASS INDEX: 38 KG/M2 | TEMPERATURE: 98.1 F | DIASTOLIC BLOOD PRESSURE: 94 MMHG | OXYGEN SATURATION: 97 % | RESPIRATION RATE: 16 BRPM | HEART RATE: 80 BPM | WEIGHT: 206.5 LBS

## 2021-08-16 DIAGNOSIS — G89.29 CHRONIC NECK PAIN: Primary | ICD-10-CM

## 2021-08-16 DIAGNOSIS — M54.2 CHRONIC NECK PAIN: Primary | ICD-10-CM

## 2021-08-16 DIAGNOSIS — D50.9 IRON DEFICIENCY ANEMIA, UNSPECIFIED IRON DEFICIENCY ANEMIA TYPE: ICD-10-CM

## 2021-08-16 DIAGNOSIS — M54.12 CERVICAL RADICULOPATHY: ICD-10-CM

## 2021-08-16 PROBLEM — E66.812 CLASS 2 SEVERE OBESITY DUE TO EXCESS CALORIES WITH SERIOUS COMORBIDITY AND BODY MASS INDEX (BMI) OF 37.0 TO 37.9 IN ADULT: Status: ACTIVE | Noted: 2019-11-11

## 2021-08-16 PROCEDURE — 99214 OFFICE O/P EST MOD 30 MIN: CPT | Performed by: INTERNAL MEDICINE

## 2021-08-17 NOTE — PROGRESS NOTES
CC: f/u neck pain    History:  Maribel Whitaker is a 45 y.o. female   She notes she has been doing fairly well, but has persistent pain in her neck that has not improved with PT. She does see Dr. Vargas and has improvement in pain and headaches for about 6 weeks after injections, but she only gets injection every 3 months. She is able to control neck pain and sees improvement in headaches with baclofen, tramadol, etc as prescribed through pain management.        ROS:  Review of Systems   Musculoskeletal: Positive for back pain and neck pain.   Neurological: Positive for headaches. Negative for numbness.        reports that she has never smoked. She has never used smokeless tobacco. She reports that she does not drink alcohol and does not use drugs.      Current Outpatient Medications:   •  ALPRAZolam (XANAX) 1 MG tablet, 1 mg Daily., Disp: , Rfl:   •  baclofen (LIORESAL) 10 MG tablet, 10 mg 2 (Two) Times a Day., Disp: , Rfl:   •  Carbonyl Iron 18 MG chewable tablet, Chew 18 mg Daily., Disp: 90 tablet, Rfl: 3  •  Cholecalciferol (Vitamin D) 50 MCG (2000 UT) capsule, Take 1 capsule by mouth Daily., Disp: 30 capsule, Rfl: 11  •  [employer] Lancets Micro Thin 33G misc, 2 (Two) Times a Day., Disp: , Rfl:   •  DULoxetine (CYMBALTA) 60 MG capsule, Take 60 mg by mouth Daily., Disp: , Rfl: 3  •  EPINEPHrine (EPIPEN) 0.3 MG/0.3ML solution auto-injector injection, USE AS DIRECTED FOR ACUTE ALLERGIC REACTION. IF USED GO TO ER. CARRY 2 PENS TO ALL INJECTIONS., Disp: , Rfl:   •  gabapentin (NEURONTIN) 300 MG capsule, 300 mg 3 (Three) Times a Day., Disp: , Rfl:   •  lamoTRIgine (LaMICtal) 150 MG tablet, Take 150 mg by mouth 2 (Two) Times a Day., Disp: , Rfl:   •  lisinopril (PRINIVIL,ZESTRIL) 20 MG tablet, Take 1 tablet by mouth Daily., Disp: 30 tablet, Rfl: 5  •  metFORMIN (Glucophage) 500 MG tablet, Take 1 tablet by mouth Daily With Breakfast., Disp: 30 tablet, Rfl: 11  •  mometasone (ELOCON) 0.1 % ointment, APPLY TO BUG BITES TWICE  "DAILY UNTIL CLEAR. DO NOT USE ON FACE., Disp: , Rfl:   •  norethindrone (MICRONOR) 0.35 MG tablet, TAKE 1 TABLET BY MOUTH EVERY DAY, Disp: 28 tablet, Rfl: 3  •  pantoprazole (PROTONIX) 40 MG EC tablet, Take 1 tablet by mouth Daily., Disp: 30 tablet, Rfl: 5  •  tobramycin 0.3 % solution ophthalmic solution, Administer 2 drops to both eyes Every 4 (Four) Hours., Disp: 5 mL, Rfl: 0  •  traMADol (ULTRAM) 50 MG tablet, Take 50 mg by mouth 2 (Two) Times a Day., Disp: , Rfl:   •  traZODone (DESYREL) 150 MG tablet, Take 150 mg by mouth Every Night., Disp: , Rfl:     OBJECTIVE:  /94 (BP Location: Left arm, Patient Position: Sitting, Cuff Size: Adult)   Pulse 80   Temp 98.1 °F (36.7 °C)   Resp 16   Ht 157.5 cm (62\")   Wt 93.7 kg (206 lb 8 oz)   SpO2 97%   Breastfeeding No   BMI 37.77 kg/m²    Physical Exam  Constitutional:       General: She is not in acute distress.  Pulmonary:      Effort: Pulmonary effort is normal. No respiratory distress.   Neurological:      Mental Status: She is alert and oriented to person, place, and time.         Assessment/Plan     Diagnoses and all orders for this visit:    1. Chronic neck pain (Primary)  2. Cervical radiculopathy  -     MRI Cervical Spine Without Contrast; Future  She had no demonstrable improvement with PT. Will order MRI for updating her imaging. Encouraged her to speak with Dr. Vargas about alternate substances to control her pain as she does have sustained relief, but does get some improvement with injections. Her headaches improve reliably when her neck pain is better, so these do seem associated.     3. Iron deficiency anemia, unspecified iron deficiency anemia type  -     CBC (No Diff); Future  -     Iron Profile; Future  -     Ferritin; Future  Labs prior to next visit given recent completion of iron infusions. She is now tolerating gummies for iron replacement.     An After Visit Summary was printed and given to the patient at discharge.  Return in about 2 " months (around 10/16/2021) for Annual physical with Ladarius queen September.          Miguel Mckenzie D.O. 8/16/2021   Electronically signed.

## 2021-08-31 ENCOUNTER — PROCEDURE VISIT (OUTPATIENT)
Dept: OTOLARYNGOLOGY | Facility: CLINIC | Age: 46
End: 2021-08-31

## 2021-08-31 ENCOUNTER — OFFICE VISIT (OUTPATIENT)
Dept: OTOLARYNGOLOGY | Facility: CLINIC | Age: 46
End: 2021-08-31

## 2021-08-31 VITALS
TEMPERATURE: 97.8 F | HEIGHT: 62 IN | WEIGHT: 208.2 LBS | DIASTOLIC BLOOD PRESSURE: 86 MMHG | HEART RATE: 69 BPM | BODY MASS INDEX: 38.31 KG/M2 | SYSTOLIC BLOOD PRESSURE: 153 MMHG

## 2021-08-31 DIAGNOSIS — H93.11 TINNITUS OF RIGHT EAR: ICD-10-CM

## 2021-08-31 DIAGNOSIS — H81.01 MENIERE DISEASE, RIGHT: Primary | ICD-10-CM

## 2021-08-31 DIAGNOSIS — H91.91 UNILATERAL HEARING LOSS, RIGHT: Primary | ICD-10-CM

## 2021-08-31 DIAGNOSIS — H81.01 MENIERE DISEASE, RIGHT: ICD-10-CM

## 2021-08-31 DIAGNOSIS — R42 VERTIGO: ICD-10-CM

## 2021-08-31 DIAGNOSIS — H90.5 SNHL (SENSORY-NEURAL HEARING LOSS), UNILATERAL: ICD-10-CM

## 2021-08-31 DIAGNOSIS — H90.41 SENSORINEURAL HEARING LOSS (SNHL) OF RIGHT EAR WITH UNRESTRICTED HEARING OF LEFT EAR: ICD-10-CM

## 2021-08-31 PROCEDURE — 99213 OFFICE O/P EST LOW 20 MIN: CPT | Performed by: EMERGENCY MEDICINE

## 2021-08-31 PROCEDURE — HEARINGNOCHG

## 2021-08-31 NOTE — PATIENT INSTRUCTIONS
CONTACT INFORMATION:  The main office phone number is [phone]. For emergencies after hours and on weekends, this number will convert over to our answering service and the on call provider will answer. Please try to keep non emergent phone calls/ questions to office hours 9am-5pm Monday through Friday.     [employer]  As an alternative, you can sign up and use the Epic MyChart system for more direct and quicker access for non emergent questions/ problems.  [facility] allows you to send messages to your doctor, view your test results, renew your prescriptions, schedule appointments, and more. To sign up, go to [facility] and click on the Sign Up Now link in the New User? box. Enter your [employer] Activation Code exactly as it appears below along with the last four digits of your Social Security Number and your Date of Birth () to complete the sign-up process. If you do not sign up before the expiration date, you must request a new code.    [employer] Activation Code: Activation code not generated  Current [employer] Status: Active    If you have questions, you can email [email or web address] or call [phone] to talk to our [employer] staff. Remember, [employer] is NOT to be used for urgent needs. For medical emergencies, dial 911.

## 2021-08-31 NOTE — PROGRESS NOTES
KAYLYNN Corrales     Chief Complaint   Patient presents with   • Follow-up     Meniere's disease, audio test, ringing ears right        HPI   Maribel Whitaker is a  45 y.o. female who presents for follow up evaluation of meniere's disease and hearing loss.  She reports her right sided hearing loss has gotten worse since her last visit.  She feels as thought she is having a hard time communicating and understanding especially in light of mask usage.  She would like to look into possibly getting a hearing aid for the right.        Past History:   Past Medical History:   Diagnosis Date   • Acid reflux    • ADHD (attention deficit hyperactivity disorder)    • Allergic    • Anxiety    • Arthritis    • Depression    • Diabetes mellitus (CMS/[facility])     Pre diabetic   • Fibromyalgia    • Fibromyalgia, primary    • HL (hearing loss)     Meniere's disease   • Hypertension    • Low back pain    • Migraines    • Neck ache      Past Surgical History:   Procedure Laterality Date   • BARIATRIC SURGERY      Sleeve   •  SECTION     • CHOLECYSTECTOMY     • GASTRIC SLEEVE LAPAROSCOPIC  2019     Family History   Problem Relation Age of Onset   • Diabetes Mother         Tyoe 2   • Obesity Mother    • Osteoporosis Mother    • Kidney cancer Mother    • Anxiety disorder Mother    • Heart disease Father    • Diabetes Father         Type 2   • Obesity Father    • Hearing loss Father         Menieres   • Breast cancer Maternal Aunt 65   • Osteoporosis Maternal Grandmother    • Ovarian cancer Neg Hx    • Uterine cancer Neg Hx    • Colon cancer Neg Hx      Social History     Tobacco Use   • Smoking status: Never Smoker   • Smokeless tobacco: Never Used   Substance Use Topics   • Alcohol use: No   • Drug use: No     Outpatient Medications Marked as Taking for the 21 encounter (Office Visit) with Chiquis Harris APRN   Medication Sig Dispense Refill   • ALPRAZolam (XANAX) 1 MG tablet 1 mg Daily.     •  baclofen (LIORESAL) 10 MG tablet 10 mg 2 (Two) Times a Day.     • Cholecalciferol (Vitamin D) 50 MCG (2000 UT) capsule Take 1 capsule by mouth Daily. 30 capsule 11   • CVS Lancets Micro Thin 33G misc 2 (Two) Times a Day.     • DULoxetine (CYMBALTA) 60 MG capsule Take 60 mg by mouth Daily.  3   • EPINEPHrine (EPIPEN) 0.3 MG/0.3ML solution auto-injector injection USE AS DIRECTED FOR ACUTE ALLERGIC REACTION. IF USED GO TO ER. CARRY 2 PENS TO ALL INJECTIONS.     • gabapentin (NEURONTIN) 300 MG capsule 300 mg 3 (Three) Times a Day.     • lamoTRIgine (LaMICtal) 150 MG tablet Take 150 mg by mouth 2 (Two) Times a Day.     • lisinopril (PRINIVIL,ZESTRIL) 20 MG tablet Take 1 tablet by mouth Daily. 30 tablet 5   • metFORMIN (Glucophage) 500 MG tablet Take 1 tablet by mouth Daily With Breakfast. 30 tablet 11   • mometasone (ELOCON) 0.1 % ointment APPLY TO BUG BITES TWICE DAILY UNTIL CLEAR. DO NOT USE ON FACE.     • norethindrone (MICRONOR) 0.35 MG tablet TAKE 1 TABLET BY MOUTH EVERY DAY 28 tablet 3   • pantoprazole (PROTONIX) 40 MG EC tablet Take 1 tablet by mouth Daily. 30 tablet 5   • traMADol (ULTRAM) 50 MG tablet Take 50 mg by mouth 2 (Two) Times a Day.     • traZODone (DESYREL) 150 MG tablet Take 150 mg by mouth Every Night.       Allergies:  Iron        Vital Signs:   Temp:  [97.8 °F (36.6 °C)] 97.8 °F (36.6 °C)  Heart Rate:  [69] 69  BP: (153)/(86) 153/86    Physical Exam  Constitutional:       Appearance: She is obese.   HENT:      Head: Normocephalic.      Right Ear: Tympanic membrane, ear canal and external ear normal. Decreased hearing noted.      Left Ear: Hearing, tympanic membrane, ear canal and external ear normal.   Cardiovascular:      Rate and Rhythm: Normal rate.   Pulmonary:      Effort: Pulmonary effort is normal.   Neurological:      Mental Status: She is alert.                      Assessment:    1. Unilateral hearing loss, right    2. Meniere disease, right    3. Tinnitus of right ear    4. SNHL  (sensory-neural hearing loss), unilateral               Plan:        Use hearing protection in loud noise situations.  Consider hearing aid amplification.  Obtain a yearly audiogram to follow hearing.  We will have office look into insurance coverage of osteointegrated implants. If covered, will begin the process of setting up and scheduling the procedure.commend yearly audiograms to follow hearing.             Return in about 1 year (around 8/31/2022) for Follow up with KAYLYNN Edouard.      KAYLYNN Corrales  08/31/21  14:31 CDT

## 2021-08-31 NOTE — PROGRESS NOTES
"AUDIOMETRIC EVALUATION      Name:  Maribel Whitaker  :  1975  Age:  45 y.o.  Date of Evaluation:  2021        History:  Reason for visit:  Ms. Whitaker is seen today at the request of ANASTACIO Whalen for a follow-up hearing evaluation. Patient was last seen on 4/15/2020 and her last audio was on 2020. Pt has a history of unilateral sensorineural hearing loss in the right ear and Meniere's right. Pt feels like her hearing in her right ear has gotten significantly worse than it was the last time her hearing was tested. She is noticing she is asking \"huh\" and \"what\" more frequently. She is also states she is dizzy all the time, and it sometimes makes her nauseous. Pt states she will have roaring noises in her right ear and it feels full. Pt denies aural fullness in the left ear and otalgia bilaterally.        EVALUATION        RESULTS:     Otoscopic Evaluation:       Bilateral: Clear canal and tympanic membrane visualized         Tympanometry:       Immittance Measures: 226 Hz       Bilateral: Type A- normal         Acoustic Reflexes (Ipsilateral):      Right Ear: Absent at all frequencies tested      Left Ear: Absent at 500 Hz, present at 1 and 2 kHz    Otoacoustic Emissions (1.5 - 8kHz)     Right Ear: Absent at 1.6, 2, 2.5 and 8 kHz. Present and normal at 3.2-7.1 kHz      Left Ear: Present and normal across all test frequencies    Test technique:  Conventional Audiometry via inserts    Reliability:   good    Pure Tone Audiometry:         Right Ear: Mild relatively flat sensorineural hearing loss       Left Ear: Hearing sensitivity is within normal limits          Speech Audiometry:        Right Ear: Speech Reception Threshold (SRT) was obtained at 30 dBHL                        Word Recognition scores were excellent or within normal limits (90 - 100%) in noise (S/N=20 dB), when words were presented at 70 dBHL using NU-6 List 4A, 25 words       Left Ear:   Speech Reception Threshold (SRT) was " obtained at 20 dBHL                       Word Recognition scores were excellent or within normal limits (90 - 100%) in noise (S/N=20 dB), when words were presented at 60 dBHL using NU-6 List 4A, 25 words      IMPRESSIONS:  Tympanometry showed normal middle ear pressure and static compliance, for both ears. Acoustic reflexes are absent in the right ear, and present at 1 and 2kHz in the left ear. Pure tone thresholds show hearing sensitivity within normal limits in the left ear and a mild sensorineural hearing loss in the right ear. DPOAEs are consistent with pure tone testing. Results suggest normal middle and inner ear function for the left ear. Results show normal middle ear function and abnormal inner ear function, consistent with previous Meniere's diagnosis in her right ear. Patient was counseled with regard to the findings.    Amplification needs:  Pt could benefit from a hearing aid in her right ear.    Diagnosis:   1. Meniere disease, right    2. Sensorineural hearing loss (SNHL) of right ear with unrestricted hearing of left ear    3. Tinnitus of right ear    4. Vertigo        RECOMMENDATIONS/PLAN:  Follow-up recommendations per KAYLYNN Edouard    Hearing aid evaluation and counseling upon medical clearance and pt motivation         ALETA Winkler  Licensed Audiologist

## 2021-09-08 ENCOUNTER — HOSPITAL ENCOUNTER (OUTPATIENT)
Dept: MRI IMAGING | Facility: HOSPITAL | Age: 46
Discharge: HOME | End: 2021-09-08
Admitting: INTERNAL MEDICINE

## 2021-09-08 DIAGNOSIS — G89.29 CHRONIC NECK PAIN: ICD-10-CM

## 2021-09-08 DIAGNOSIS — M54.2 CHRONIC NECK PAIN: ICD-10-CM

## 2021-09-08 DIAGNOSIS — M54.12 CERVICAL RADICULOPATHY: ICD-10-CM

## 2021-09-08 PROCEDURE — 72141 MRI NECK SPINE W/O DYE: CPT

## 2021-09-20 ENCOUNTER — TRANSCRIBE ORDERS (OUTPATIENT)
Dept: ADMINISTRATIVE | Facility: HOSPITAL | Age: 46
End: 2021-09-20

## 2021-09-20 ENCOUNTER — LAB (OUTPATIENT)
Dept: LAB | Facility: HOSPITAL | Age: 46
End: 2021-09-20

## 2021-09-20 DIAGNOSIS — R82.5 POSITIVE URINE DRUG SCREEN: Primary | ICD-10-CM

## 2021-09-20 DIAGNOSIS — Z79.899 ENCOUNTER FOR LONG-TERM (CURRENT) USE OF OTHER MEDICATIONS: ICD-10-CM

## 2021-09-20 LAB
AMPHET+METHAMPHET UR QL: NEGATIVE
AMPHETAMINES UR QL: NEGATIVE
BARBITURATES UR QL SCN: NEGATIVE
BENZODIAZ UR QL SCN: POSITIVE
BUPRENORPHINE SERPL-MCNC: NEGATIVE NG/ML
CANNABINOIDS SERPL QL: NEGATIVE
COCAINE UR QL: NEGATIVE
METHADONE UR QL SCN: NEGATIVE
OPIATES UR QL: NEGATIVE
OXYCODONE UR QL SCN: NEGATIVE
PCP UR QL SCN: NEGATIVE
PROPOXYPH UR QL: NEGATIVE
TRICYCLICS UR QL SCN: NEGATIVE

## 2021-09-20 PROCEDURE — G0480 DRUG TEST DEF 1-7 CLASSES: HCPCS | Performed by: PSYCHIATRY & NEUROLOGY

## 2021-09-20 PROCEDURE — 80306 DRUG TEST PRSMV INSTRMNT: CPT | Performed by: PSYCHIATRY & NEUROLOGY

## 2021-09-28 LAB — BENZODIAZ UR QL: NEGATIVE

## 2021-10-15 ENCOUNTER — TRANSCRIBE ORDERS (OUTPATIENT)
Dept: ADMINISTRATIVE | Facility: HOSPITAL | Age: 46
End: 2021-10-15

## 2021-10-15 DIAGNOSIS — M54.6 THORACIC SPINE PAIN: Primary | ICD-10-CM

## 2021-10-18 RX ORDER — BLOOD-GLUCOSE METER
KIT MISCELLANEOUS
Qty: 50 EACH | Refills: 11 | Status: SHIPPED | OUTPATIENT
Start: 2021-10-18 | End: 2022-10-10

## 2021-10-23 DIAGNOSIS — K21.9 GASTROESOPHAGEAL REFLUX DISEASE, UNSPECIFIED WHETHER ESOPHAGITIS PRESENT: ICD-10-CM

## 2021-10-25 RX ORDER — PANTOPRAZOLE SODIUM 40 MG/1
40 TABLET, DELAYED RELEASE ORAL DAILY
Qty: 30 TABLET | Refills: 5 | Status: SHIPPED | OUTPATIENT
Start: 2021-10-25 | End: 2022-04-07 | Stop reason: SDUPTHER

## 2021-10-28 ENCOUNTER — HOSPITAL ENCOUNTER (OUTPATIENT)
Dept: GENERAL RADIOLOGY | Facility: HOSPITAL | Age: 46
Discharge: HOME | End: 2021-10-28
Admitting: PAIN MEDICINE

## 2021-10-28 DIAGNOSIS — M54.6 THORACIC SPINE PAIN: ICD-10-CM

## 2021-10-28 PROCEDURE — 72072 X-RAY EXAM THORAC SPINE 3VWS: CPT

## 2021-11-10 DIAGNOSIS — I10 ESSENTIAL HYPERTENSION: ICD-10-CM

## 2021-11-10 RX ORDER — LISINOPRIL 20 MG/1
20 TABLET ORAL DAILY
Qty: 30 TABLET | Refills: 5 | Status: SHIPPED | OUTPATIENT
Start: 2021-11-10 | End: 2022-04-07 | Stop reason: SDUPTHER

## 2021-11-24 RX ORDER — ACETAMINOPHEN AND CODEINE PHOSPHATE 120; 12 MG/5ML; MG/5ML
SOLUTION ORAL
Qty: 28 TABLET | Refills: 0 | Status: SHIPPED | OUTPATIENT
Start: 2021-11-24 | End: 2022-01-21

## 2021-12-20 RX ORDER — ACETAMINOPHEN AND CODEINE PHOSPHATE 120; 12 MG/5ML; MG/5ML
SOLUTION ORAL
Qty: 28 TABLET | Refills: 0 | OUTPATIENT
Start: 2021-12-20

## 2021-12-22 ENCOUNTER — OFFICE VISIT (OUTPATIENT)
Dept: INTERNAL MEDICINE | Facility: CLINIC | Age: 46
End: 2021-12-22

## 2021-12-22 VITALS
OXYGEN SATURATION: 99 % | HEIGHT: 62 IN | WEIGHT: 207 LBS | SYSTOLIC BLOOD PRESSURE: 106 MMHG | TEMPERATURE: 98.2 F | HEART RATE: 71 BPM | DIASTOLIC BLOOD PRESSURE: 78 MMHG | BODY MASS INDEX: 38.09 KG/M2 | RESPIRATION RATE: 16 BRPM

## 2021-12-22 DIAGNOSIS — F33.0 MILD EPISODE OF RECURRENT MAJOR DEPRESSIVE DISORDER (HCC): ICD-10-CM

## 2021-12-22 DIAGNOSIS — I10 ESSENTIAL HYPERTENSION: ICD-10-CM

## 2021-12-22 DIAGNOSIS — Z23 NEED FOR VACCINATION: ICD-10-CM

## 2021-12-22 DIAGNOSIS — Z00.01 ENCOUNTER FOR ROUTINE ADULT HEALTH EXAMINATION WITH ABNORMAL FINDINGS: Primary | ICD-10-CM

## 2021-12-22 DIAGNOSIS — D50.9 IRON DEFICIENCY ANEMIA, UNSPECIFIED IRON DEFICIENCY ANEMIA TYPE: ICD-10-CM

## 2021-12-22 DIAGNOSIS — K21.9 GASTROESOPHAGEAL REFLUX DISEASE WITHOUT ESOPHAGITIS: ICD-10-CM

## 2021-12-22 DIAGNOSIS — E66.01 CLASS 2 SEVERE OBESITY DUE TO EXCESS CALORIES WITH SERIOUS COMORBIDITY AND BODY MASS INDEX (BMI) OF 37.0 TO 37.9 IN ADULT (HCC): ICD-10-CM

## 2021-12-22 DIAGNOSIS — Z12.11 SCREENING FOR COLON CANCER: ICD-10-CM

## 2021-12-22 DIAGNOSIS — K59.00 CONSTIPATION, UNSPECIFIED CONSTIPATION TYPE: ICD-10-CM

## 2021-12-22 PROCEDURE — 99396 PREV VISIT EST AGE 40-64: CPT | Performed by: NURSE PRACTITIONER

## 2021-12-22 PROCEDURE — 90471 IMMUNIZATION ADMIN: CPT | Performed by: NURSE PRACTITIONER

## 2021-12-22 PROCEDURE — 99214 OFFICE O/P EST MOD 30 MIN: CPT | Performed by: NURSE PRACTITIONER

## 2021-12-22 PROCEDURE — 90686 IIV4 VACC NO PRSV 0.5 ML IM: CPT | Performed by: NURSE PRACTITIONER

## 2021-12-22 RX ORDER — DOCUSATE SODIUM 100 MG/1
100 CAPSULE, LIQUID FILLED ORAL 2 TIMES DAILY
Qty: 60 CAPSULE | Refills: 1 | Status: SHIPPED | OUTPATIENT
Start: 2021-12-22 | End: 2022-02-14

## 2021-12-22 RX ORDER — BLOOD-GLUCOSE METER
KIT MISCELLANEOUS
COMMUNITY
Start: 2021-10-12

## 2021-12-22 NOTE — PROGRESS NOTES
Chief Complaint   Patient presents with   • Annual Exam   • Hypertension       History:  Maribel Whitaker is a 46 y.o. female who presents today for follow-up for evaluation of the above:    HPI       Patient presents today for annual exam and f/u HTN  Patient reports compliance with blood pressure medications without adverse side effects.   She does report generalized soreness from fibromyalgia.  Taking iron gummies as she could not tolerate the oral tablets.     Continues to see [facility] for depression which she states is not well controlled at this time.   F/u with them scheduled for two weeks        ROS:  Review of Systems   Constitutional: Negative for fatigue and unexpected weight change.   HENT: Negative.    Eyes: Negative.    Respiratory: Negative.    Cardiovascular: Negative.    Gastrointestinal: Positive for constipation. Negative for abdominal pain and diarrhea.   Endocrine: Negative.    Genitourinary: Negative for difficulty urinating, dyspareunia, genital sores, menstrual problem, pelvic pain, vaginal bleeding, vaginal discharge and vaginal pain.   Musculoskeletal: Positive for myalgias.   Skin: Negative.    Neurological: Negative.    Psychiatric/Behavioral: Positive for dysphoric mood.       Ms. Whitaker  reports that she has never smoked. She has never used smokeless tobacco. She reports that she does not drink alcohol and does not use drugs.      Current Outpatient Medications:   •  ALPRAZolam (XANAX) 1 MG tablet, 1 mg Daily., Disp: , Rfl:   •  baclofen (LIORESAL) 10 MG tablet, 10 mg 2 (Two) Times a Day., Disp: , Rfl:   •  Blood Glucose Monitoring Suppl (FreeStyle Lite) device, , Disp: , Rfl:   •  Cholecalciferol (Vitamin D) 50 MCG (2000 UT) capsule, Take 1 capsule by mouth Daily., Disp: 30 capsule, Rfl: 11  •  [employer]S Lancets Micro Thin 33G misc, 2 (Two) Times a Day., Disp: , Rfl:   •  DULoxetine (CYMBALTA) 60 MG capsule, Take 60 mg by mouth Daily., Disp: , Rfl: 3  •  EPINEPHrine (EPIPEN) 0.3  "MG/0.3ML solution auto-injector injection, USE AS DIRECTED FOR ACUTE ALLERGIC REACTION. IF USED GO TO ER. CARRY 2 PENS TO ALL INJECTIONS., Disp: , Rfl:   •  Ferrous Sulfate (IRON PO), Take 2 tablets by mouth Daily., Disp: , Rfl:   •  FREESTYLE LITE test strip, USE FOR TESTING ONCE A DAY, Disp: 50 each, Rfl: 11  •  gabapentin (NEURONTIN) 300 MG capsule, 300 mg 3 (Three) Times a Day., Disp: , Rfl:   •  lamoTRIgine (LaMICtal) 150 MG tablet, Take 150 mg by mouth 2 (Two) Times a Day., Disp: , Rfl:   •  lisinopril (PRINIVIL,ZESTRIL) 20 MG tablet, Take 1 tablet by mouth Daily., Disp: 30 tablet, Rfl: 5  •  metFORMIN (Glucophage) 500 MG tablet, Take 1 tablet by mouth Daily With Breakfast., Disp: 30 tablet, Rfl: 11  •  mometasone (ELOCON) 0.1 % ointment, APPLY TO BUG BITES TWICE DAILY UNTIL CLEAR. DO NOT USE ON FACE., Disp: , Rfl:   •  norethindrone (MICRONOR) 0.35 MG tablet, TAKE 1 TABLET BY MOUTH EVERY DAY, Disp: 28 tablet, Rfl: 0  •  pantoprazole (PROTONIX) 40 MG EC tablet, Take 1 tablet by mouth Daily., Disp: 30 tablet, Rfl: 5  •  traMADol (ULTRAM) 50 MG tablet, Take 50 mg by mouth 2 (Two) Times a Day., Disp: , Rfl:   •  traZODone (DESYREL) 150 MG tablet, Take 150 mg by mouth Every Night., Disp: , Rfl:   •  docusate sodium (Colace) 100 MG capsule, Take 1 capsule by mouth 2 (Two) Times a Day., Disp: 60 capsule, Rfl: 1  •  tobramycin 0.3 % solution ophthalmic solution, Administer 2 drops to both eyes Every 4 (Four) Hours., Disp: 5 mL, Rfl: 0      OBJECTIVE:  /78 (BP Location: Right arm, Patient Position: Sitting, Cuff Size: Large Adult)   Pulse 71   Temp 98.2 °F (36.8 °C) (Temporal)   Resp 16   Ht 157.5 cm (62.01\")   Wt 93.9 kg (207 lb)   SpO2 99%   BMI 37.85 kg/m²    Physical Exam  Vitals reviewed.   Constitutional:       Appearance: She is well-developed.   HENT:      Head: Normocephalic and atraumatic.   Eyes:      Pupils: Pupils are equal, round, and reactive to light.   Cardiovascular:      Rate and " Rhythm: Normal rate and regular rhythm.      Heart sounds: Normal heart sounds.   Pulmonary:      Effort: Pulmonary effort is normal.      Breath sounds: Normal breath sounds.   Abdominal:      General: Bowel sounds are normal.      Palpations: Abdomen is soft.   Musculoskeletal:         General: Normal range of motion.      Cervical back: Normal range of motion and neck supple.   Skin:     General: Skin is warm and dry.   Neurological:      Mental Status: She is alert and oriented to person, place, and time.         Assessment/Plan    Diagnoses and all orders for this visit:    1. Encounter for routine adult health examination with abnormal findings (Primary)  Immunizations:      - Tetanus: Unknown or >10 years ago. Recommend to have at pharmacy or on injury.      - Influenza: recommended annually      - Pneumovax:once after age 65      - Prevnar: Once after age 65      - Zostavax: Once after age 60  Colon Cancer Screening: Due at 45  Mammogram: Due at 40.  PAP: at age 21  DEXA: DEXA scan at 65  COVID vaccine information is available at vaccine.[place].gov     2. Need for vaccination  -     FluLaval/Fluarix/Fluzone >6 Months (4652-5561)    3. Essential hypertension  Well controlled, BP goal for age is <140/90 per JNC 8 guidelines and continue current medications  4. Class 2 severe obesity due to excess calories with serious comorbidity and body mass index (BMI) of 37.0 to 37.9 in adult ([facility])  Recommended attention to portion control and being careful about the types and timing of meals for the purpose of weight management.    5. Gastroesophageal reflux diseasse without esophagitis  Stable     6. Constipation, unspecified constipation type  -     docusate sodium (Colace) 100 MG capsule; Take 1 capsule by mouth 2 (Two) Times a Day.  Dispense: 60 capsule; Refill: 1    7.Mild episode of recurrent major depressive disorder ([facility])  Patient has f/u scheduled with her behavioral health provider.     8.Iron deficiency anemia,  unspecified iron deficiency anemia type  Labs to monitor       An After Visit Summary was printed and given to the patient at discharge.  Return in about 6 months (around 6/22/2022). Sooner if problems arise.          Reyna CHAIDEZ. 12/23/2021   Electronically Signed

## 2022-01-21 RX ORDER — ACETAMINOPHEN AND CODEINE PHOSPHATE 120; 12 MG/5ML; MG/5ML
SOLUTION ORAL
Qty: 28 TABLET | Refills: 0 | Status: SHIPPED | OUTPATIENT
Start: 2022-01-21 | End: 2022-03-31 | Stop reason: SDUPTHER

## 2022-02-09 ENCOUNTER — OFFICE VISIT (OUTPATIENT)
Dept: OBSTETRICS AND GYNECOLOGY | Facility: CLINIC | Age: 47
End: 2022-02-09

## 2022-02-09 VITALS — HEIGHT: 62 IN | BODY MASS INDEX: 37.85 KG/M2

## 2022-02-09 DIAGNOSIS — Z01.419 WELL WOMAN EXAM WITH ROUTINE GYNECOLOGICAL EXAM: Primary | ICD-10-CM

## 2022-02-09 DIAGNOSIS — Z23 COVID-19 VACCINE REGIMEN TO MAINTAIN IMMUNITY COMPLETED: ICD-10-CM

## 2022-02-09 DIAGNOSIS — E66.9 OBESITY (BMI 30-39.9): ICD-10-CM

## 2022-02-09 DIAGNOSIS — Z12.12 SCREENING FOR COLORECTAL CANCER: ICD-10-CM

## 2022-02-09 DIAGNOSIS — Z78.9 NON-SMOKER: ICD-10-CM

## 2022-02-09 DIAGNOSIS — Z12.11 SCREENING FOR COLORECTAL CANCER: ICD-10-CM

## 2022-02-09 DIAGNOSIS — Z12.31 ENCOUNTER FOR SCREENING MAMMOGRAM FOR MALIGNANT NEOPLASM OF BREAST: ICD-10-CM

## 2022-02-09 DIAGNOSIS — Z12.4 CERVICAL CANCER SCREENING: ICD-10-CM

## 2022-02-09 PROCEDURE — G0123 SCREEN CERV/VAG THIN LAYER: HCPCS | Performed by: ADVANCED PRACTICE MIDWIFE

## 2022-02-09 PROCEDURE — 87624 HPV HI-RISK TYP POOLED RSLT: CPT | Performed by: ADVANCED PRACTICE MIDWIFE

## 2022-02-09 PROCEDURE — 99396 PREV VISIT EST AGE 40-64: CPT | Performed by: ADVANCED PRACTICE MIDWIFE

## 2022-02-09 NOTE — PROGRESS NOTES
"Subjective     Maribel Whitaker is a 46 y.o. female    Patient arrived for a gyne appointment with an annual well woman examination. She denies any questions or concerns at this time. Reports she eats a healthy diet and gets regular exercise or activity \"most of the time.\"         Ht 157.5 cm (62.01\")   LMP 01/24/2021 (Exact Date)   Breastfeeding No   BMI 37.85 kg/m²     Outpatient Encounter Medications as of 2/9/2022   Medication Sig Dispense Refill   • ALPRAZolam (XANAX) 1 MG tablet 1 mg Daily.     • Blood Glucose Monitoring Suppl (FreeStyle Lite) device      • Cholecalciferol (Vitamin D) 50 MCG (2000 UT) capsule Take 1 capsule by mouth Daily. 30 capsule 11   • [employer] Lancets Micro Thin 33G misc 2 (Two) Times a Day.     • DULoxetine (CYMBALTA) 60 MG capsule Take 60 mg by mouth Daily.  3   • EPINEPHrine (EPIPEN) 0.3 MG/0.3ML solution auto-injector injection USE AS DIRECTED FOR ACUTE ALLERGIC REACTION. IF USED GO TO ER. CARRY 2 PENS TO ALL INJECTIONS.     • Ferrous Sulfate (IRON PO) Take 2 tablets by mouth Daily.     • FREESTYLE LITE test strip USE FOR TESTING ONCE A DAY 50 each 11   • gabapentin (NEURONTIN) 300 MG capsule 300 mg 3 (Three) Times a Day.     • lamoTRIgine (LaMICtal) 150 MG tablet Take 150 mg by mouth 2 (Two) Times a Day.     • lisinopril (PRINIVIL,ZESTRIL) 20 MG tablet Take 1 tablet by mouth Daily. 30 tablet 5   • metFORMIN (GLUCOPHAGE) 500 MG tablet TAKE 1 TABLET BY MOUTH EVERY DAY WITH BREAKFAST 30 tablet 11   • mometasone (ELOCON) 0.1 % ointment APPLY TO BUG BITES TWICE DAILY UNTIL CLEAR. DO NOT USE ON FACE.     • norethindrone (MICRONOR) 0.35 MG tablet TAKE 1 TABLET BY MOUTH EVERY DAY 28 tablet 0   • pantoprazole (PROTONIX) 40 MG EC tablet Take 1 tablet by mouth Daily. 30 tablet 5   • traMADol (ULTRAM) 50 MG tablet Take 50 mg by mouth 2 (Two) Times a Day.     • traZODone (DESYREL) 150 MG tablet Take 150 mg by mouth Every Night.     • [DISCONTINUED] docusate sodium (Colace) 100 MG capsule Take 1 " capsule by mouth 2 (Two) Times a Day. 60 capsule 1   • baclofen (LIORESAL) 10 MG tablet 10 mg 2 (Two) Times a Day.       No facility-administered encounter medications on file as of 2022.       Surgical History  Past Surgical History:   Procedure Laterality Date   •  SECTION     • CHOLECYSTECTOMY     • GASTRIC SLEEVE LAPAROSCOPIC  2019       Family History  Family History   Problem Relation Age of Onset   • Diabetes Mother         Tyoe 2   • Obesity Mother    • Osteoporosis Mother    • Kidney cancer Mother    • Anxiety disorder Mother    • Heart disease Father    • Diabetes Father         Type 2   • Obesity Father    • Hearing loss Father         Menieres   • Breast cancer Maternal Aunt 65   • Osteoporosis Maternal Grandmother    • Ovarian cancer Neg Hx    • Uterine cancer Neg Hx    • Colon cancer Neg Hx    • Melanoma Neg Hx        The following portions of the patient's history were reviewed and updated as appropriate: allergies, current medications, past family history, past medical history, past social history, past surgical history, and problem list.    Review of Systems   Constitutional: Negative for activity change, appetite change, chills, diaphoresis, fatigue, fever, unexpected weight gain and unexpected weight loss.   HENT: Negative.    Eyes: Negative.         Wears glasses     Respiratory: Negative for cough, chest tightness and shortness of breath.    Cardiovascular: Negative for chest pain, palpitations and leg swelling.   Gastrointestinal: Positive for constipation (since gastric sleeve surgery). Negative for abdominal distention, abdominal pain, blood in stool, diarrhea, nausea, vomiting, GERD and indigestion.   Endocrine: Negative for cold intolerance, heat intolerance, polydipsia, polyphagia and polyuria.   Genitourinary: Negative for amenorrhea, breast discharge, breast lump, breast pain, decreased libido, decreased urine volume, difficulty urinating, dyspareunia, dysuria, flank pain,  frequency, genital sores, hematuria, menstrual problem, pelvic pain, pelvic pressure, urgency, urinary incontinence, vaginal bleeding, vaginal discharge and vaginal pain.   Musculoskeletal: Positive for arthralgias, back pain and myalgias. Negative for gait problem, joint swelling and neck pain.   Skin: Negative for color change, dry skin, pallor, rash, skin lesions, wound and bruise.   Allergic/Immunologic: Negative for environmental allergies, food allergies and immunocompromised state.   Neurological: Positive for weakness (with fibromyalgia), numbness (with fibromyalgia) and headache. Negative for dizziness, tremors, seizures, syncope, facial asymmetry, speech difficulty, light-headedness, memory problem and confusion.   Hematological: Negative for adenopathy. Does not bruise/bleed easily.   Psychiatric/Behavioral: Positive for agitation and depressed mood. Negative for behavioral problems, decreased concentration, dysphoric mood, hallucinations, self-injury, sleep disturbance, suicidal ideas, negative for hyperactivity and stress. The patient is nervous/anxious.        Objective   Physical Exam  Vitals and nursing note reviewed. Exam conducted with a chaperone present.   Constitutional:       General: She is not in acute distress.     Appearance: Normal appearance. She is well-developed and well-groomed. She is obese. She is not ill-appearing or diaphoretic.   HENT:      Head: Normocephalic and atraumatic.      Right Ear: External ear normal.      Left Ear: External ear normal.   Eyes:      General: Lids are normal. No scleral icterus.        Right eye: No discharge.         Left eye: No discharge.      Extraocular Movements: Extraocular movements intact.      Conjunctiva/sclera: Conjunctivae normal.   Neck:      Thyroid: No thyroid mass, thyromegaly or thyroid tenderness.      Trachea: Trachea and phonation normal. No tracheal deviation.   Cardiovascular:      Rate and Rhythm: Normal rate and regular rhythm.       Pulses: Normal pulses.      Heart sounds: Normal heart sounds. No murmur heard.      Pulmonary:      Effort: Pulmonary effort is normal. No accessory muscle usage or respiratory distress.      Breath sounds: Normal breath sounds and air entry. No wheezing.   Chest:      Chest wall: No mass, lacerations, deformity, swelling or tenderness.   Breasts: Breasts are symmetrical.      Right: No swelling, bleeding, inverted nipple, mass, nipple discharge, skin change, tenderness, axillary adenopathy or supraclavicular adenopathy.      Left: No swelling, bleeding, inverted nipple, mass, nipple discharge, skin change, tenderness, axillary adenopathy or supraclavicular adenopathy.       Abdominal:      General: Bowel sounds are normal. There is no distension.      Palpations: Abdomen is soft. There is no mass.      Tenderness: There is no abdominal tenderness. There is no right CVA tenderness, left CVA tenderness, guarding or rebound.      Hernia: No hernia is present. There is no hernia in the left inguinal area or right inguinal area.   Genitourinary:     General: Normal vulva.      Exam position: Supine.      Pubic Area: No rash or pubic lice.       Labia:         Right: No rash, tenderness, lesion or injury.         Left: No rash, tenderness, lesion or injury.       Urethra: No prolapse, urethral pain, urethral swelling or urethral lesion.      Vagina: No vaginal discharge, erythema, tenderness, bleeding or lesions.      Cervix: No cervical motion tenderness, discharge, friability, lesion, erythema or cervical bleeding.      Uterus: Normal.       Adnexa:         Right: No mass, tenderness or fullness.          Left: No mass, tenderness or fullness.        Rectum: Normal.      Comments: BSU normal. Perineum normal.   Musculoskeletal:         General: No tenderness. Normal range of motion.      Cervical back: Normal range of motion and neck supple. No edema, signs of trauma, rigidity or tenderness. No pain with  movement. Normal range of motion.      Right lower leg: No edema.      Left lower leg: No edema.   Lymphadenopathy:      Head:      Right side of head: No submental, submandibular, tonsillar, preauricular, posterior auricular or occipital adenopathy.      Left side of head: No submental, submandibular, tonsillar, preauricular, posterior auricular or occipital adenopathy.      Cervical: No cervical adenopathy.      Upper Body:      Right upper body: No supraclavicular, axillary or pectoral adenopathy.      Left upper body: No supraclavicular, axillary or pectoral adenopathy.      Lower Body: No right inguinal adenopathy. No left inguinal adenopathy.   Skin:     General: Skin is warm and dry.      Capillary Refill: Capillary refill takes less than 2 seconds.      Coloration: Skin is not ashen, cyanotic, jaundiced, mottled, pale or sallow.      Findings: No bruising, erythema, lesion or rash.   Neurological:      General: No focal deficit present.      Mental Status: She is alert and oriented to person, place, and time.      Motor: No abnormal muscle tone.      Gait: Gait normal.   Psychiatric:         Attention and Perception: Attention and perception normal.         Mood and Affect: Mood and affect normal.         Speech: Speech normal.         Behavior: Behavior normal. Behavior is cooperative.         Thought Content: Thought content normal.         Cognition and Memory: Cognition and memory normal.         Judgment: Judgment normal.         Chart reviewed for screenings  Last Pap Smear: 10/13/2020 NILM; HPV negative   Last Mammogram: 11/20/2019 no mammographic evidence of malignancy (screening guidelines recommend screening beginning at age 40 or as clinically indicated)  Last Colonoscopy: Not yet completed. (screening guidelines recommend screening beginning at age 45 or as clinically indicated)  Last Bone Density Scan: Not yet indicated. (screening guidelines recommend screening beginning at age 65 or as  clinically indicated)    Assessment/Plan   Diagnoses and all orders for this visit:    1. Well woman exam with routine gynecological exam (Primary)  - Gyne exam today. Discussed healthy lifestyle measures and general well-being. Discussed and encouraged regular skin self assessments, report concerns and findings to a provider, and practice prevention measures, such as wearing sunscreen when outdoors. Discussed measures of support for improving mental health, including engaging within a healthy support system, daily exercise, meditation, journaling, and aromatherapy.   - Counseled on and encouraged healthy lifestyle measures for bone health: adequate intake of calcium and vitamin D, regular weight bearing exercise, while voiding alcohol, nicotine, and tobacco.  - Return to office annually for a well woman exam and as needed for concerns.    2. Encounter for screening mammogram for malignant neoplasm of breast  - Breast exam today. Counseled and encouraged self breast exam/self breast awareness, patient is aware how to do self breast exam and the importance of the same.   -     Mammo screening digital tomosynthesis bilateral w CAD    3. Cervical cancer screening  - Pelvic exam today with pap smear obtained within ASCCP guidelines.  -     Liquid-based Pap Smear, Screening  -     HPV DNA Probe, Direct - ThinPrep Vial, Cervix    4. COVID-19 vaccine regimen to maintain immunity completed    5. Non-smoker    6. Obesity (BMI 30-39.9)  - BMI today 37.85, classified as obesity. Discussed weight management and importance of maintaining a healthy weight. Counseled on healthy lifestyle measures, including healthy dietary selections, portion control, and daily exercise. BMI for Adults education included in the AVS.    7. Screening for colorectal cancer  - Discussed colorectal cancer screening. Patient desires to complete the Cologuard test versus referral to gastroenterology for a screening colonoscopy.  -     Cologuard - Stool,  Per Rectum    Patient's Body mass index is 37.85 kg/m². indicating that she is obese (BMI >30). Obesity-related health conditions include the following: hypertension, diabetes mellitus and GERD. Obesity is unchanged. BMI is above average, and BMI management plan is encouraged. We discussed portion control and increasing exercise.      This note has been signed electronically.    Huma Petty, DEWEY, APRN, CNM, RNC-OB

## 2022-02-10 LAB
GEN CATEG CVX/VAG CYTO-IMP: NORMAL
HPV I/H RISK 4 DNA CVX QL PROBE+SIG AMP: NOT DETECTED
LAB AP CASE REPORT: NORMAL
LAB AP GYN ADDITIONAL INFORMATION: NORMAL
LAB AP GYN OTHER FINDINGS: NORMAL
PATH INTERP SPEC-IMP: NORMAL
STAT OF ADQ CVX/VAG CYTO-IMP: NORMAL

## 2022-02-14 DIAGNOSIS — K59.00 CONSTIPATION, UNSPECIFIED CONSTIPATION TYPE: ICD-10-CM

## 2022-02-14 RX ORDER — DOCUSATE SODIUM 100 MG/1
100 CAPSULE, LIQUID FILLED ORAL 2 TIMES DAILY
Qty: 60 CAPSULE | Refills: 2 | Status: SHIPPED | OUTPATIENT
Start: 2022-02-14 | End: 2022-05-16

## 2022-02-14 RX ORDER — LANCETS 28 GAUGE
EACH MISCELLANEOUS
Qty: 100 EACH | Refills: 3 | Status: SHIPPED | OUTPATIENT
Start: 2022-02-14

## 2022-03-31 DIAGNOSIS — Z30.41 ENCOUNTER FOR SURVEILLANCE OF CONTRACEPTIVE PILLS: Primary | ICD-10-CM

## 2022-03-31 RX ORDER — ACETAMINOPHEN AND CODEINE PHOSPHATE 120; 12 MG/5ML; MG/5ML
1 SOLUTION ORAL DAILY
Qty: 28 TABLET | Refills: 12 | Status: SHIPPED | OUTPATIENT
Start: 2022-03-31

## 2022-03-31 NOTE — TELEPHONE ENCOUNTER
Pt called asking for BC refills.  Pt was seen in office for annual on 2/9/22.  Wanting to make sure it is ok to refill this med.   Medication pended.  Please advise

## 2022-04-07 ENCOUNTER — OFFICE VISIT (OUTPATIENT)
Dept: INTERNAL MEDICINE | Facility: CLINIC | Age: 47
End: 2022-04-07

## 2022-04-07 VITALS
TEMPERATURE: 97 F | OXYGEN SATURATION: 98 % | WEIGHT: 201.4 LBS | DIASTOLIC BLOOD PRESSURE: 86 MMHG | BODY MASS INDEX: 37.06 KG/M2 | HEIGHT: 62 IN | SYSTOLIC BLOOD PRESSURE: 130 MMHG | HEART RATE: 75 BPM | RESPIRATION RATE: 16 BRPM

## 2022-04-07 DIAGNOSIS — R51.9 CHRONIC DAILY HEADACHE: Primary | ICD-10-CM

## 2022-04-07 DIAGNOSIS — H93.11 TINNITUS OF RIGHT EAR: ICD-10-CM

## 2022-04-07 DIAGNOSIS — K21.9 GASTROESOPHAGEAL REFLUX DISEASE, UNSPECIFIED WHETHER ESOPHAGITIS PRESENT: ICD-10-CM

## 2022-04-07 DIAGNOSIS — M47.812 CERVICAL SPONDYLOSIS: ICD-10-CM

## 2022-04-07 DIAGNOSIS — H91.91 UNILATERAL HEARING LOSS, RIGHT: ICD-10-CM

## 2022-04-07 DIAGNOSIS — H81.01 MENIERE DISEASE, RIGHT: ICD-10-CM

## 2022-04-07 DIAGNOSIS — I10 ESSENTIAL HYPERTENSION: ICD-10-CM

## 2022-04-07 PROCEDURE — 99214 OFFICE O/P EST MOD 30 MIN: CPT | Performed by: INTERNAL MEDICINE

## 2022-04-07 RX ORDER — DULOXETIN HYDROCHLORIDE 30 MG/1
1 CAPSULE, DELAYED RELEASE ORAL DAILY
COMMUNITY
Start: 2022-03-13

## 2022-04-07 RX ORDER — LISINOPRIL 20 MG/1
20 TABLET ORAL DAILY
Qty: 30 TABLET | Refills: 5 | Status: SHIPPED | OUTPATIENT
Start: 2022-04-07 | End: 2022-10-11

## 2022-04-07 RX ORDER — PANTOPRAZOLE SODIUM 40 MG/1
40 TABLET, DELAYED RELEASE ORAL DAILY
Qty: 30 TABLET | Refills: 5 | Status: SHIPPED | OUTPATIENT
Start: 2022-04-07 | End: 2022-10-18

## 2022-04-07 NOTE — PROGRESS NOTES
CC: chronic daily headache    History:  Maribel Whitaker is a 46 y.o. female   She notes ongoing issues with headaches that occur nearly every day with 2 to 3/month that become quite debilitating.  They will begin at the base of her skull and neck area, but radiate up into the bilateral sides of her skull.  She has not had any visual symptoms, nausea or vomiting.  They typically begin as the day wears on and are not present in the morning.        ROS:  Review of Systems   Respiratory: Negative for shortness of breath.    Cardiovascular: Negative for chest pain.   Neurological: Positive for headaches.        reports that she has never smoked. She has never used smokeless tobacco. She reports that she does not drink alcohol and does not use drugs.      Current Outpatient Medications:   •  ALPRAZolam (XANAX) 1 MG tablet, 1 mg Daily., Disp: , Rfl:   •  baclofen (LIORESAL) 10 MG tablet, 10 mg 2 (Two) Times a Day., Disp: , Rfl:   •  Blood Glucose Monitoring Suppl (FreeStyle Lite) device, , Disp: , Rfl:   •  Cholecalciferol (Vitamin D) 50 MCG (2000 UT) capsule, Take 1 capsule by mouth Daily., Disp: 30 capsule, Rfl: 11  •  [employer] Lancets Micro Thin 33G misc, 2 (Two) Times a Day., Disp: , Rfl:   •  docusate sodium (COLACE) 100 MG capsule, Take 1 capsule by mouth 2 (Two) Times a Day., Disp: 60 capsule, Rfl: 2  •  DULoxetine (CYMBALTA) 30 MG capsule, Take 1 capsule by mouth Daily., Disp: , Rfl:   •  DULoxetine (CYMBALTA) 60 MG capsule, Take 60 mg by mouth Daily., Disp: , Rfl: 3  •  EPINEPHrine (EPIPEN) 0.3 MG/0.3ML solution auto-injector injection, USE AS DIRECTED FOR ACUTE ALLERGIC REACTION. IF USED GO TO ER. CARRY 2 PENS TO ALL INJECTIONS., Disp: , Rfl:   •  Ferrous Sulfate (IRON PO), Take 2 tablets by mouth Daily., Disp: , Rfl:   •  FREESTYLE LITE test strip, USE FOR TESTING ONCE A DAY, Disp: 50 each, Rfl: 11  •  gabapentin (NEURONTIN) 300 MG capsule, 300 mg 3 (Three) Times a Day., Disp: , Rfl:   •  lamoTRIgine (LaMICtal)  "150 MG tablet, Take 150 mg by mouth 2 (Two) Times a Day., Disp: , Rfl:   •  Lancets (freestyle) lancets, USE FOR TESTING ONCE A DAY, Disp: 100 each, Rfl: 3  •  lisinopril (PRINIVIL,ZESTRIL) 20 MG tablet, Take 1 tablet by mouth Daily., Disp: 30 tablet, Rfl: 5  •  metFORMIN (GLUCOPHAGE) 500 MG tablet, TAKE 1 TABLET BY MOUTH EVERY DAY WITH BREAKFAST, Disp: 30 tablet, Rfl: 11  •  mometasone (ELOCON) 0.1 % ointment, APPLY TO BUG BITES TWICE DAILY UNTIL CLEAR. DO NOT USE ON FACE., Disp: , Rfl:   •  norethindrone (MICRONOR) 0.35 MG tablet, Take 1 tablet by mouth Daily., Disp: 28 tablet, Rfl: 12  •  pantoprazole (PROTONIX) 40 MG EC tablet, Take 1 tablet by mouth Daily., Disp: 30 tablet, Rfl: 5  •  traMADol (ULTRAM) 50 MG tablet, Take 50 mg by mouth 2 (Two) Times a Day., Disp: , Rfl:   •  traZODone (DESYREL) 150 MG tablet, Take 150 mg by mouth Every Night., Disp: , Rfl:     OBJECTIVE:  /86 (BP Location: Left arm, Patient Position: Sitting, Cuff Size: Adult)   Pulse 75   Temp 97 °F (36.1 °C)   Resp 16   Ht 157.5 cm (62.01\")   Wt 91.4 kg (201 lb 6.4 oz)   SpO2 98%   Breastfeeding No   BMI 36.82 kg/m²    Physical Exam  Constitutional:       General: She is not in acute distress.  Pulmonary:      Effort: Pulmonary effort is normal. No respiratory distress.   Neurological:      Mental Status: She is alert and oriented to person, place, and time.     MRI Brain With & Without Contrast (03/10/2020 16:33)   MRI Internal Auditory Canal With Wo (03/10/2020 16:41)   MRI Cervical Spine Without Contrast (09/08/2021 12:39)       Assessment/Plan     Diagnoses and all orders for this visit:    1. Chronic daily headache (Primary)  2. Cervical spondylosis  3. Meniere disease, right  4. Tinnitus of right ear  5. Unilateral hearing loss, right  -     Ambulatory Referral to Neurology  Her headaches do not have features of typical migraine.  She has used muscle relaxers and Fioricet with only modest benefit.  Given her known cervical " spondylosis, I still believe this is likely associated, but she did not have improvement with physical therapy last summer.  She is still doing her home exercise program from time to time.  We will refer to neurology for further evaluation given her complex history of Ménière's disease.    6. Gastroesophageal reflux disease, unspecified whether esophagitis present  -     pantoprazole (PROTONIX) 40 MG EC tablet; Take 1 tablet by mouth Daily.  Dispense: 30 tablet; Refill: 5  Stable without exacerbation on PPI.    7. Essential hypertension  -     lisinopril (PRINIVIL,ZESTRIL) 20 MG tablet; Take 1 tablet by mouth Daily.  Dispense: 30 tablet; Refill: 5  Well controlled, BP goal for age is <140/90 per JNC 8 guidelines and continue current medications    An After Visit Summary was printed and given to the patient at discharge.  Return for Next scheduled follow up.          Miguel Mckenzie D.O. 4/7/2022   Electronically signed.

## 2022-04-18 ENCOUNTER — TELEPHONE (OUTPATIENT)
Dept: OTOLARYNGOLOGY | Facility: CLINIC | Age: 47
End: 2022-04-18

## 2022-04-18 NOTE — TELEPHONE ENCOUNTER
Delete after reviewing: Telephone encounter to be sent to clinical pool.    Caller:  FERN LOLIS    Relationship: SELF    Best call back number: [phone]    What is your medical concern? WANTS TO TALK ABOUT EAR IMPLANTS LIKE DISCUSSED DURING LAST VISIT.    How long has this issue been going on? 5YEARS    Is your provider already aware of this issue? YES    Have you been treated for this issue? CURRENTLY SEEING ART DÍAZ.

## 2022-05-04 DIAGNOSIS — R19.5 POSITIVE COLORECTAL CANCER SCREENING USING COLOGUARD TEST: Primary | ICD-10-CM

## 2022-05-16 ENCOUNTER — OFFICE VISIT (OUTPATIENT)
Dept: OTOLARYNGOLOGY | Facility: CLINIC | Age: 47
End: 2022-05-16

## 2022-05-16 VITALS
SYSTOLIC BLOOD PRESSURE: 136 MMHG | WEIGHT: 200 LBS | DIASTOLIC BLOOD PRESSURE: 85 MMHG | HEIGHT: 62 IN | BODY MASS INDEX: 36.8 KG/M2 | HEART RATE: 71 BPM | TEMPERATURE: 97.7 F

## 2022-05-16 DIAGNOSIS — K59.00 CONSTIPATION, UNSPECIFIED CONSTIPATION TYPE: ICD-10-CM

## 2022-05-16 DIAGNOSIS — H91.91 UNILATERAL HEARING LOSS, RIGHT: ICD-10-CM

## 2022-05-16 DIAGNOSIS — H90.41 SENSORINEURAL HEARING LOSS (SNHL) OF RIGHT EAR WITH UNRESTRICTED HEARING OF LEFT EAR: Primary | ICD-10-CM

## 2022-05-16 DIAGNOSIS — H93.11 TINNITUS OF RIGHT EAR: ICD-10-CM

## 2022-05-16 DIAGNOSIS — H81.01 MENIERE DISEASE, RIGHT: ICD-10-CM

## 2022-05-16 PROCEDURE — 99212 OFFICE O/P EST SF 10 MIN: CPT | Performed by: EMERGENCY MEDICINE

## 2022-05-16 RX ORDER — DOCUSATE SODIUM 100 MG/1
100 CAPSULE, LIQUID FILLED ORAL 2 TIMES DAILY
Qty: 60 CAPSULE | Refills: 2 | Status: SHIPPED | OUTPATIENT
Start: 2022-05-16 | End: 2022-08-08

## 2022-05-16 NOTE — PROGRESS NOTES
[facility]  [address], [address]  [place] [address]  Fax [phone]  Phone [phone]      Visit Type: FOLLOW UP   Chief Complaint   Patient presents with   • Ear Problem        HPI  She presents for a follow up evaluation. She has had continued problems with hearing loss. The symptoms are localized to the right side. The symptoms severity was described as: mild The symptoms have been: relatively constant for the last several years There have been no identified factors that aggravate the symptoms. There have been no factors that have improved the symptoms.  She reports she is unable to get conventional hearing aids due to cost.  She is not employed making the workforce development program for hearing loss unavailable as well.    Past Medical History:   Diagnosis Date   • Acid reflux    • ADHD (attention deficit hyperactivity disorder)    • Allergic    • Anxiety    • Arthritis    • Depression    • Diabetes mellitus (HCC)     Pre diabetic   • Fibromyalgia    • Fibromyalgia, primary    • HL (hearing loss)     Meniere's disease   • Hypertension    • Low back pain    • Migraines    • Neck ache        Past Surgical History:   Procedure Laterality Date   •  SECTION     • CHOLECYSTECTOMY     • GASTRIC SLEEVE LAPAROSCOPIC  2019       Family History: Her family history includes Anxiety disorder in her mother; Breast cancer (age of onset: 65) in her maternal aunt; Diabetes in her father and mother; Hearing loss in her father; Heart disease in her father; Kidney cancer in her mother; Obesity in her father and mother; Osteoporosis in her maternal grandmother and mother.     Social History: She  reports that she has never smoked. She has never used smokeless tobacco. She reports that she does not drink alcohol and does not use drugs.    Home Medications:  ALPRAZolam, [employer]S Lancets Micro Thin 33G,  DULoxetine, EPINEPHrine, Ferrous Sulfate, FreeStyle Lite, Vitamin D, baclofen, docusate sodium, freestyle, gabapentin, glucose blood, lamoTRIgine, lisinopril, metFORMIN, mometasone, norethindrone, pantoprazole, traMADol, and traZODone    Allergies:  She is allergic to iron.       Vital Signs:   Temp:  [97.7 °F (36.5 °C)] 97.7 °F (36.5 °C)  Heart Rate:  [71] 71  BP: (136)/(85) 136/85  ENT Physical Exam  Constitutional  Appearance: patient appears well-developed, well-nourished and well-groomed,  Communication/Voice: communication appropriate for developmental age; vocal quality normal;  Ear  Hearing: intact;  Auricles: right auricle normal; left auricle normal;  External Mastoids: right external mastoid normal; left external mastoid normal;  Ear Canals: right ear canal normal; left ear canal normal;  Tympanic Membranes: right tympanic membrane normal; left tympanic membrane normal;         Result Review    RESULTS REVIEW    I have reviewed the patients old records in the chart.     Assessment & Plan    Diagnoses and all orders for this visit:    1. Sensorineural hearing loss (SNHL) of right ear with unrestricted hearing of left ear (Primary)  -     Comprehensive Hearing Test; Future    2. Unilateral hearing loss, right  -     Comprehensive Hearing Test; Future    3. Meniere disease, right  -     Comprehensive Hearing Test; Future    4. Tinnitus of right ear  -     Comprehensive Hearing Test; Future            Call for ear problems, especially change of hearing, ear pain or dizziness.      Return in about 1 year (around 5/16/2023) for Follow up with Audiogram.      Chiquis Harris, APRN  05/16/22  15:48 CDT

## 2022-06-25 DIAGNOSIS — E55.9 VITAMIN D DEFICIENCY: ICD-10-CM

## 2022-06-27 ENCOUNTER — OFFICE VISIT (OUTPATIENT)
Dept: NEUROLOGY | Facility: CLINIC | Age: 47
End: 2022-06-27

## 2022-06-27 VITALS
SYSTOLIC BLOOD PRESSURE: 110 MMHG | HEART RATE: 66 BPM | BODY MASS INDEX: 37.06 KG/M2 | DIASTOLIC BLOOD PRESSURE: 72 MMHG | HEIGHT: 62 IN | WEIGHT: 201.4 LBS | OXYGEN SATURATION: 96 %

## 2022-06-27 DIAGNOSIS — G43.719 INTRACTABLE CHRONIC MIGRAINE WITHOUT AURA AND WITHOUT STATUS MIGRAINOSUS: ICD-10-CM

## 2022-06-27 DIAGNOSIS — G56.03 CARPAL TUNNEL SYNDROME, BILATERAL: Primary | ICD-10-CM

## 2022-06-27 PROCEDURE — 99204 OFFICE O/P NEW MOD 45 MIN: CPT | Performed by: PSYCHIATRY & NEUROLOGY

## 2022-06-27 RX ORDER — CETIRIZINE HYDROCHLORIDE 10 MG/1
10 TABLET ORAL DAILY
COMMUNITY

## 2022-06-27 RX ORDER — TOPIRAMATE 25 MG/1
TABLET ORAL
Qty: 120 TABLET | Refills: 3 | Status: SHIPPED | OUTPATIENT
Start: 2022-06-27 | End: 2022-08-03 | Stop reason: SDUPTHER

## 2022-06-27 RX ORDER — ACETAMINOPHEN 160 MG
TABLET,DISINTEGRATING ORAL
Qty: 30 CAPSULE | Refills: 11 | Status: SHIPPED | OUTPATIENT
Start: 2022-06-27

## 2022-06-29 ENCOUNTER — TELEPHONE (OUTPATIENT)
Dept: NEUROLOGY | Facility: CLINIC | Age: 47
End: 2022-06-29

## 2022-06-29 NOTE — TELEPHONE ENCOUNTER
Caller: Maribel Whitaker    Relationship: PATIENT  Best call back number: [phone]    Requested MEDICATION SAMPLES:    PATIENT CALLED AND STATES THAT SHE WAS GIVEN SAMPLES OF NEURTEC.  SHE HAS USED THESE AND WOULD LIKE TO PICK-UP MORE SAMPLES FROM THE OFFICE.    PLEASE ADVISE.    THANK YOU.       Ollie BENAVIDEZ Rep   06/29/22 14:57 CDT

## 2022-08-03 DIAGNOSIS — G43.719 INTRACTABLE CHRONIC MIGRAINE WITHOUT AURA AND WITHOUT STATUS MIGRAINOSUS: ICD-10-CM

## 2022-08-03 RX ORDER — TOPIRAMATE 50 MG/1
50 TABLET, FILM COATED ORAL 2 TIMES DAILY
Qty: 60 TABLET | Refills: 1 | Status: SHIPPED | OUTPATIENT
Start: 2022-08-10 | End: 2022-09-26

## 2022-08-03 RX ORDER — TOPIRAMATE 25 MG/1
25 TABLET ORAL 2 TIMES DAILY
Qty: 14 TABLET | Refills: 0 | Status: SHIPPED | OUTPATIENT
Start: 2022-08-03 | End: 2022-08-16

## 2022-08-03 NOTE — TELEPHONE ENCOUNTER
[facility] said insurance won't cover Topamax due to quantity.  Maribel said she hasn't received the script.  Explained that we will need to send 2 scripts due to insurance.

## 2022-08-08 DIAGNOSIS — K59.00 CONSTIPATION, UNSPECIFIED CONSTIPATION TYPE: ICD-10-CM

## 2022-08-08 RX ORDER — DOCUSATE SODIUM 100 MG/1
100 CAPSULE, LIQUID FILLED ORAL 2 TIMES DAILY
Qty: 60 CAPSULE | Refills: 2 | Status: SHIPPED | OUTPATIENT
Start: 2022-08-08 | End: 2022-11-17 | Stop reason: SDUPTHER

## 2022-08-16 ENCOUNTER — OFFICE VISIT (OUTPATIENT)
Dept: INTERNAL MEDICINE | Facility: CLINIC | Age: 47
End: 2022-08-16

## 2022-08-16 VITALS
WEIGHT: 194.5 LBS | RESPIRATION RATE: 16 BRPM | BODY MASS INDEX: 35.79 KG/M2 | DIASTOLIC BLOOD PRESSURE: 86 MMHG | TEMPERATURE: 97.6 F | HEIGHT: 62 IN | SYSTOLIC BLOOD PRESSURE: 124 MMHG | OXYGEN SATURATION: 98 % | HEART RATE: 86 BPM

## 2022-08-16 DIAGNOSIS — I10 ESSENTIAL HYPERTENSION: ICD-10-CM

## 2022-08-16 DIAGNOSIS — R51.9 CHRONIC DAILY HEADACHE: ICD-10-CM

## 2022-08-16 DIAGNOSIS — Z98.84 S/P LAPAROSCOPIC SLEEVE GASTRECTOMY: ICD-10-CM

## 2022-08-16 DIAGNOSIS — K21.9 GASTROESOPHAGEAL REFLUX DISEASE WITHOUT ESOPHAGITIS: ICD-10-CM

## 2022-08-16 DIAGNOSIS — R73.02 IMPAIRED GLUCOSE TOLERANCE: ICD-10-CM

## 2022-08-16 DIAGNOSIS — E66.01 CLASS 2 SEVERE OBESITY DUE TO EXCESS CALORIES WITH SERIOUS COMORBIDITY AND BODY MASS INDEX (BMI) OF 35.0 TO 35.9 IN ADULT: Primary | ICD-10-CM

## 2022-08-16 DIAGNOSIS — R19.5 POSITIVE COLORECTAL CANCER SCREENING USING COLOGUARD TEST: ICD-10-CM

## 2022-08-16 DIAGNOSIS — E55.9 VITAMIN D DEFICIENCY: ICD-10-CM

## 2022-08-16 PROCEDURE — 99214 OFFICE O/P EST MOD 30 MIN: CPT | Performed by: INTERNAL MEDICINE

## 2022-08-16 RX ORDER — FAMOTIDINE 20 MG/1
20 TABLET, FILM COATED ORAL DAILY PRN
Qty: 30 TABLET | Refills: 1 | Status: SHIPPED | OUTPATIENT
Start: 2022-08-16 | End: 2022-10-13

## 2022-08-16 NOTE — PROGRESS NOTES
CC: f/u obesity s/p bariatric surgery    History:  Maribel Whitaker is a 46 y.o. female   She notes she has been doing reasonably well without any acute illness.  She has not had any symptoms of iron deficiency, but has had this previously after her bariatric surgery.  She has not had labs in some time.  She does have some improvement in headaches with Topamax and Nurtec and has follow-up with neurology scheduled.       ROS:  Review of Systems   Respiratory: Negative for shortness of breath.    Cardiovascular: Negative for chest pain.   Neurological: Positive for dizziness and headaches. Negative for syncope.        reports that she has never smoked. She has never used smokeless tobacco. She reports that she does not drink alcohol and does not use drugs.      Current Outpatient Medications:   •  ALPRAZolam (XANAX) 1 MG tablet, 1 mg Daily., Disp: , Rfl:   •  baclofen (LIORESAL) 10 MG tablet, 10 mg 3 (Three) Times a Day., Disp: , Rfl:   •  Blood Glucose Monitoring Suppl (FreeStyle Lite) device, , Disp: , Rfl:   •  cetirizine (zyrTEC) 10 MG tablet, Take 10 mg by mouth Daily., Disp: , Rfl:   •  Cholecalciferol (Vitamin D3) 50 MCG (2000 UT) capsule, TAKE 1 CAPSULE BY MOUTH EVERY DAY, Disp: 30 capsule, Rfl: 11  •  [employer] Lancets Micro Thin 33G misc, 2 (Two) Times a Day., Disp: , Rfl:   •  docusate sodium (COLACE) 100 MG capsule, Take 1 capsule by mouth 2 (Two) Times a Day., Disp: 60 capsule, Rfl: 2  •  DULoxetine (CYMBALTA) 30 MG capsule, Take 1 capsule by mouth Daily., Disp: , Rfl:   •  DULoxetine (CYMBALTA) 60 MG capsule, Take 60 mg by mouth Daily., Disp: , Rfl: 3  •  EPINEPHrine (EPIPEN) 0.3 MG/0.3ML solution auto-injector injection, USE AS DIRECTED FOR ACUTE ALLERGIC REACTION. IF USED GO TO ER. CARRY 2 PENS TO ALL INJECTIONS., Disp: , Rfl:   •  Ferrous Sulfate (IRON PO), Take 2 tablets by mouth Daily., Disp: , Rfl:   •  FREESTYLE LITE test strip, USE FOR TESTING ONCE A DAY, Disp: 50 each, Rfl: 11  •  gabapentin  "(NEURONTIN) 300 MG capsule, 300 mg 3 (Three) Times a Day., Disp: , Rfl:   •  lamoTRIgine (LaMICtal) 150 MG tablet, Take 150 mg by mouth 2 (Two) Times a Day., Disp: , Rfl:   •  Lancets (freestyle) lancets, USE FOR TESTING ONCE A DAY, Disp: 100 each, Rfl: 3  •  lisinopril (PRINIVIL,ZESTRIL) 20 MG tablet, Take 1 tablet by mouth Daily., Disp: 30 tablet, Rfl: 5  •  metFORMIN (GLUCOPHAGE) 500 MG tablet, TAKE 1 TABLET BY MOUTH EVERY DAY WITH BREAKFAST, Disp: 30 tablet, Rfl: 11  •  norethindrone (MICRONOR) 0.35 MG tablet, Take 1 tablet by mouth Daily., Disp: 28 tablet, Rfl: 12  •  pantoprazole (PROTONIX) 40 MG EC tablet, Take 1 tablet by mouth Daily., Disp: 30 tablet, Rfl: 5  •  topiramate (TOPAMAX) 50 MG tablet, Take 1 tablet by mouth 2 (Two) Times a Day., Disp: 60 tablet, Rfl: 1  •  traMADol (ULTRAM) 50 MG tablet, Take 50 mg by mouth 2 (Two) Times a Day., Disp: , Rfl:   •  traZODone (DESYREL) 150 MG tablet, Take 150 mg by mouth Every Night., Disp: , Rfl:   •  famotidine (Pepcid) 20 MG tablet, Take 1 tablet by mouth Daily As Needed for Heartburn., Disp: 30 tablet, Rfl: 1    OBJECTIVE:  /86 (BP Location: Left arm, Patient Position: Sitting, Cuff Size: Adult)   Pulse 86   Temp 97.6 °F (36.4 °C)   Resp 16   Ht 157.5 cm (62\")   Wt 88.2 kg (194 lb 8 oz)   SpO2 98%   Breastfeeding No   BMI 35.57 kg/m²    Physical Exam  Constitutional:       General: She is not in acute distress.  Cardiovascular:      Rate and Rhythm: Normal rate and regular rhythm.      Heart sounds: Normal heart sounds. No murmur heard.  Pulmonary:      Effort: Pulmonary effort is normal.      Breath sounds: Normal breath sounds. No wheezing.   Neurological:      Mental Status: She is alert and oriented to person, place, and time.      Gait: Gait normal.   Psychiatric:         Mood and Affect: Mood normal.         Behavior: Behavior normal.         Assessment/Plan     Diagnoses and all orders for this visit:    1. Class 2 severe obesity due to " excess calories with serious comorbidity and body mass index (BMI) of 35.0 to 35.9 in adult (HCC) (Primary)  2. S/P laparoscopic sleeve gastrectomy  -     CBC (No Diff); Future  -     Iron Profile; Future  -     Ferritin; Future  Class 2 Severe Obesity (BMI >=35 and <=39.9). Obesity-related health conditions include the following: hypertension and GERD. Obesity is improving with lifestyle modifications. BMI is is above average; BMI management plan is completed. We discussed portion control and increasing exercise.    3. Essential hypertension  -     Comprehensive Metabolic Panel; Future  -     Lipid Panel; Future  -     CBC (No Diff); Future  -     Iron Profile; Future  -     Ferritin; Future  Well controlled, BP goal for age is <140/90 per JNC 8 guidelines and continue current medications    4. Chronic daily headache  Stable on prophylactic and abortive therapy with Neurology follow-up scheduled.     5. Impaired glucose tolerance  -     Hemoglobin A1c; Future    6. Positive colorectal cancer screening using Cologuard test  She has pending evaluation with GI for colonoscopy. Encouraged to keep.     7. Gastroesophageal reflux disease without esophagitis  -     famotidine (Pepcid) 20 MG tablet; Take 1 tablet by mouth Daily As Needed for Heartburn.  Dispense: 30 tablet; Refill: 1  Add H2RB for prn use for intermittent symptoms on top of PPI therapy.     8. Vitamin D deficiency  -     Vitamin D 25 Hydroxy; Future    An After Visit Summary was printed and given to the patient at discharge.  Return in about 6 months (around 2/16/2023) for Annual physical.          Miguel Mckenzie D.O. 8/16/2022   Electronically signed.

## 2022-08-22 ENCOUNTER — OFFICE VISIT (OUTPATIENT)
Dept: NEUROLOGY | Facility: CLINIC | Age: 47
End: 2022-08-22

## 2022-08-22 VITALS
OXYGEN SATURATION: 98 % | BODY MASS INDEX: 36.14 KG/M2 | WEIGHT: 196.4 LBS | HEIGHT: 62 IN | DIASTOLIC BLOOD PRESSURE: 84 MMHG | HEART RATE: 75 BPM | SYSTOLIC BLOOD PRESSURE: 126 MMHG

## 2022-08-22 DIAGNOSIS — G43.719 INTRACTABLE CHRONIC MIGRAINE WITHOUT AURA AND WITHOUT STATUS MIGRAINOSUS: Primary | ICD-10-CM

## 2022-08-22 PROCEDURE — 99214 OFFICE O/P EST MOD 30 MIN: CPT | Performed by: PSYCHIATRY & NEUROLOGY

## 2022-08-22 RX ORDER — RIMEGEPANT SULFATE 75 MG/75MG
75 TABLET, ORALLY DISINTEGRATING ORAL ONCE AS NEEDED
Qty: 16 TABLET | Refills: 5 | Status: SHIPPED | OUTPATIENT
Start: 2022-08-22

## 2022-08-22 NOTE — PROGRESS NOTES
Chief Complaint    Subjective        Maribel Whitaker presents to [facility]    47 yo female here for f/u of headache.  At last visit she was started on Topamax.  She has had improvement in the intensity but not in the frequency.      Past Medical History:   Diagnosis Date   • Acid reflux    • ADHD (attention deficit hyperactivity disorder)    • Allergic    • Anxiety    • Arthritis    • Depression    • Diabetes mellitus (HCC) 2020    Pre diabetic   • Fibromyalgia    • Fibromyalgia, primary    • HL (hearing loss) 2020    Meniere's disease   • Hypertension    • Low back pain 2012   • Migraines    • Neck ache           Current Outpatient Medications:   •  ALPRAZolam (XANAX) 1 MG tablet, Take 1 mg by mouth As Needed., Disp: , Rfl:   •  baclofen (LIORESAL) 10 MG tablet, 10 mg 3 (Three) Times a Day., Disp: , Rfl:   •  Blood Glucose Monitoring Suppl (FreeStyle Lite) device, , Disp: , Rfl:   •  cetirizine (zyrTEC) 10 MG tablet, Take 10 mg by mouth Daily., Disp: , Rfl:   •  Cholecalciferol (Vitamin D3) 50 MCG (2000 UT) capsule, TAKE 1 CAPSULE BY MOUTH EVERY DAY, Disp: 30 capsule, Rfl: 11  •  [employer] Lancets Micro Thin 33G misc, 2 (Two) Times a Day., Disp: , Rfl:   •  docusate sodium (COLACE) 100 MG capsule, Take 1 capsule by mouth 2 (Two) Times a Day., Disp: 60 capsule, Rfl: 2  •  DULoxetine (CYMBALTA) 30 MG capsule, Take 1 capsule by mouth Daily., Disp: , Rfl:   •  DULoxetine (CYMBALTA) 60 MG capsule, Take 60 mg by mouth Daily., Disp: , Rfl: 3  •  EPINEPHrine (EPIPEN) 0.3 MG/0.3ML solution auto-injector injection, USE AS DIRECTED FOR ACUTE ALLERGIC REACTION. IF USED GO TO ER. CARRY 2 PENS TO ALL INJECTIONS., Disp: , Rfl:   •  famotidine (Pepcid) 20 MG tablet, Take 1 tablet by mouth Daily As Needed for Heartburn., Disp: 30 tablet, Rfl: 1  •  Ferrous Sulfate (IRON PO), Take 2 tablets by mouth Daily., Disp: , Rfl:   •  FREESTYLE LITE test strip, USE FOR TESTING ONCE A DAY, Disp: 50 each, Rfl: 11  •   "gabapentin (NEURONTIN) 300 MG capsule, 300 mg 3 (Three) Times a Day., Disp: , Rfl:   •  lamoTRIgine (LaMICtal) 150 MG tablet, Take 150 mg by mouth 2 (Two) Times a Day., Disp: , Rfl:   •  Lancets (freestyle) lancets, USE FOR TESTING ONCE A DAY, Disp: 100 each, Rfl: 3  •  lisinopril (PRINIVIL,ZESTRIL) 20 MG tablet, Take 1 tablet by mouth Daily., Disp: 30 tablet, Rfl: 5  •  metFORMIN (GLUCOPHAGE) 500 MG tablet, TAKE 1 TABLET BY MOUTH EVERY DAY WITH BREAKFAST, Disp: 30 tablet, Rfl: 11  •  norethindrone (MICRONOR) 0.35 MG tablet, Take 1 tablet by mouth Daily., Disp: 28 tablet, Rfl: 12  •  pantoprazole (PROTONIX) 40 MG EC tablet, Take 1 tablet by mouth Daily., Disp: 30 tablet, Rfl: 5  •  topiramate (TOPAMAX) 50 MG tablet, Take 1 tablet by mouth 2 (Two) Times a Day., Disp: 60 tablet, Rfl: 1  •  traMADol (ULTRAM) 50 MG tablet, Take 50 mg by mouth 2 (Two) Times a Day., Disp: , Rfl:   •  traZODone (DESYREL) 150 MG tablet, Take 150 mg by mouth Every Night., Disp: , Rfl:        Objective   Vital Signs:   Ht 157.5 cm (62\")   Wt 89.1 kg (196 lb 6.4 oz)   BMI 35.92 kg/m²     Physical Exam  Constitutional:       General: She is awake.   Eyes:      Extraocular Movements: Extraocular movements intact.      Pupils: Pupils are equal, round, and reactive to light.   Neurological:      Mental Status: She is alert.      Deep Tendon Reflexes: Strength normal and reflexes are normal and symmetric.   Psychiatric:         Speech: Speech normal.        Neurological Exam  Mental Status  Awake and alert. Oriented to person, place and time. Recent and remote memory are intact. Speech is normal. Language is fluent with no aphasia. Attention and concentration are normal. Fund of knowledge is appropriate for level of education.    Cranial Nerves  CN II: Visual fields full to confrontation.  CN III, IV, VI: Extraocular movements intact bilaterally. Pupils equal round and reactive to light bilaterally.  CN V: Facial sensation is normal.  CN VII: " Full and symmetric facial movement.  CN IX, X: Palate elevates symmetrically  CN XI: Shoulder shrug strength is normal.  CN XII: Tongue midline without atrophy or fasciculations.    Motor  Normal muscle bulk throughout. Normal muscle tone. No abnormal involuntary movements. Strength is 5/5 throughout all four extremities.    Sensory  Light touch is normal in upper and lower extremities.   Tinel's at both wrists.    Reflexes  Deep tendon reflexes are 2+ and symmetric in all four extremities.    Coordination  Right: Finger-to-nose normal.Left: Finger-to-nose normal.    Gait  Casual gait is normal including stance, stride, and arm swing.      Result Review :                     Assessment and Plan   45 yo female with headaches. Already on cymbalta, gabapentin, lamictal, tramadol. Has HTN so would not try triptans.  On lisinopril.  No reduction in frequency with Topamax.  Benefit with Nurtec.  Her numbness in hands may be CTS, Tinel's +. Will start with wrist splints.     Plan:     1.  Start Emgality.  2. Nurtec at headache onset.   3.  Follow-up 3 months.       Follow Up   No follow-ups on file.  Patient was given instructions and counseling regarding her condition or for health maintenance advice. Please see specific information pulled into the AVS if appropriate.

## 2022-08-24 ENCOUNTER — OFFICE VISIT (OUTPATIENT)
Dept: GASTROENTEROLOGY | Facility: CLINIC | Age: 47
End: 2022-08-24

## 2022-08-24 VITALS
HEART RATE: 74 BPM | DIASTOLIC BLOOD PRESSURE: 80 MMHG | WEIGHT: 195 LBS | OXYGEN SATURATION: 98 % | HEIGHT: 62 IN | BODY MASS INDEX: 35.88 KG/M2 | SYSTOLIC BLOOD PRESSURE: 126 MMHG | TEMPERATURE: 97 F

## 2022-08-24 DIAGNOSIS — R19.5 POSITIVE COLORECTAL CANCER SCREENING USING DNA-BASED STOOL TEST: Primary | ICD-10-CM

## 2022-08-24 DIAGNOSIS — D50.9 IRON DEFICIENCY ANEMIA, UNSPECIFIED IRON DEFICIENCY ANEMIA TYPE: ICD-10-CM

## 2022-08-24 PROCEDURE — 99214 OFFICE O/P EST MOD 30 MIN: CPT | Performed by: NURSE PRACTITIONER

## 2022-08-24 RX ORDER — SODIUM PICOSULFATE, MAGNESIUM OXIDE, AND ANHYDROUS CITRIC ACID 10; 3.5; 12 MG/160ML; G/160ML; G/160ML
1 LIQUID ORAL TAKE AS DIRECTED
Qty: 320 ML | Refills: 0 | Status: SHIPPED | OUTPATIENT
Start: 2022-08-24

## 2022-08-24 NOTE — PROGRESS NOTES
Chief Complaint   Patient presents with   • Colonoscopy     Colaguard positive never had colon       PCP: Miguel Mckenzie DO  REFER: Huma Petty APRN    Subjective     HPI    She presents to office with positive cologaurd test from PCP office in May 2022.     She denies change in bowels.  Bowels described as moving irregular. No abdominal pain.  No BRBPR.  No melena.  Weight is stable.  She has not undergone previous colonoscopy evaluation.  There is not a family history of colon cancer or colon polyps.  Currently takes miralax   S/p gastric surgery    Cologuard - Stool, Per Rectum (2022 00:00)    Lab Results - Last 18 Months   Lab Units 21  1434 21  0916   HEMOGLOBIN g/dL 9.1* 9.3*   HEMATOCRIT % 31.1* 31.8*   MCV fL 64.0* 64.6*   WBC 10*3/mm3 8.95 6.86   PLATELETS 10*3/mm3 352 345       Lab Results - Last 18 Months   Lab Units 21  1434   GLUCOSE mg/dL 100*   SODIUM mmol/L 140   POTASSIUM mmol/L 3.6   CREATININE mg/dL 0.74   BUN mg/dL 14   BUN / CREAT RATIO  18.9   ALK PHOS U/L 89   ALT (SGPT) U/L 11   AST (SGOT) U/L 14   BILIRUBIN mg/dL 0.3   ALBUMIN g/dL 4.50       Lab Results - Last 18 Months   Lab Units 21  1434   EGFR IF NONAFRICN AM mL/min/1.73 85       Lab Results - Last 18 Months   Lab Units 21  1434   IRON mcg/dL 21*   TIBC mcg/dL 532   IRON SATURATION % 4*   FERRITIN ng/mL 3.89*           Past Medical History:   Diagnosis Date   • Acid reflux    • ADHD (attention deficit hyperactivity disorder)    • Allergic    • Anxiety    • Arthritis    • Depression    • Diabetes mellitus (HCC)     Pre diabetic   • Fibromyalgia    • Fibromyalgia, primary    • HL (hearing loss)     Meniere's disease   • Hypertension    • Low back pain    • Migraines    • Neck ache        Past Surgical History:   Procedure Laterality Date   •  SECTION     • CHOLECYSTECTOMY     • GASTRIC SLEEVE LAPAROSCOPIC  2019       Outpatient Medications Marked as Taking for the  8/24/22 encounter (Office Visit) with Ramos Wheatley APRN   Medication Sig Dispense Refill   • ALPRAZolam (XANAX) 1 MG tablet Take 1 mg by mouth As Needed.     • baclofen (LIORESAL) 10 MG tablet 10 mg 3 (Three) Times a Day.     • cetirizine (zyrTEC) 10 MG tablet Take 10 mg by mouth Daily.     • Cholecalciferol (Vitamin D3) 50 MCG (2000 UT) capsule TAKE 1 CAPSULE BY MOUTH EVERY DAY 30 capsule 11   • docusate sodium (COLACE) 100 MG capsule Take 1 capsule by mouth 2 (Two) Times a Day. 60 capsule 2   • DULoxetine (CYMBALTA) 30 MG capsule Take 1 capsule by mouth Daily.     • DULoxetine (CYMBALTA) 60 MG capsule Take 60 mg by mouth Daily.  3   • famotidine (Pepcid) 20 MG tablet Take 1 tablet by mouth Daily As Needed for Heartburn. 30 tablet 1   • Ferrous Sulfate (IRON PO) Take 2 tablets by mouth Daily.     • gabapentin (NEURONTIN) 300 MG capsule 300 mg 3 (Three) Times a Day.     • galcanezumab-gnlm (EMGALITY) 120 MG/ML auto-injector pen Inject 1 mL under the skin into the appropriate area as directed Every 30 (Thirty) Days. 1.12 mL 11   • lamoTRIgine (LaMICtal) 150 MG tablet Take 150 mg by mouth 2 (Two) Times a Day.     • lisinopril (PRINIVIL,ZESTRIL) 20 MG tablet Take 1 tablet by mouth Daily. 30 tablet 5   • traMADol (ULTRAM) 50 MG tablet Take 50 mg by mouth 2 (Two) Times a Day.         Allergies   Allergen Reactions   • Iron GI Intolerance       Social History     Socioeconomic History   • Marital status:    Tobacco Use   • Smoking status: Never Smoker   • Smokeless tobacco: Never Used   Vaping Use   • Vaping Use: Never used   Substance and Sexual Activity   • Alcohol use: No   • Drug use: No   • Sexual activity: Yes     Partners: Male     Birth control/protection: OCP       Review of Systems   Constitutional: Negative for unexpected weight change.   Respiratory: Negative for shortness of breath.    Cardiovascular: Negative for chest pain.   Gastrointestinal: Negative for abdominal pain and anal bleeding.  "      Objective     Vitals:    08/24/22 1006   BP: 126/80   Pulse: 74   Temp: 97 °F (36.1 °C)   SpO2: 98%   Weight: 88.5 kg (195 lb)   Height: 157.5 cm (62\")     Body mass index is 35.67 kg/m².    Physical Exam  Constitutional:       Appearance: Normal appearance. She is well-developed.   Eyes:      General: No scleral icterus.  Cardiovascular:      Rate and Rhythm: Regular rhythm.      Heart sounds: Normal heart sounds. No murmur heard.  Pulmonary:      Effort: Pulmonary effort is normal. No accessory muscle usage.      Breath sounds: Normal breath sounds.   Abdominal:      General: Bowel sounds are normal. There is no distension.      Palpations: Abdomen is soft. There is no mass.      Tenderness: There is no abdominal tenderness. There is no guarding or rebound.   Skin:     General: Skin is warm and dry.      Coloration: Skin is not jaundiced.   Neurological:      Mental Status: She is alert.   Psychiatric:         Behavior: Behavior is cooperative.         Imaging Results (Most Recent)     None          Body mass index is 35.67 kg/m².    Assessment & Plan     Diagnoses and all orders for this visit:    1. Positive colorectal cancer screening using DNA-based stool test (Primary)  -     Case Request; Standing  -     Case Request    2. Iron deficiency anemia, unspecified iron deficiency anemia type    Other orders  -     Clenpiq 10-3.5-12 MG-GM -GM/160ML solution; Take 160 mL by mouth Take As Directed.  Dispense: 320 mL; Refill: 0        COLONOSCOPY WITH ANESTHESIA (N/A)    Continue Miralax as currently taking  Night before clear liquid diet take 4-5 doses of miralax at one time  clenpiq is smallest option, explained importance of water consumption through day of clear liquid diet.  Maribel Whitaker verbalized understanding and stated that would not be an issue      Advised pt to stop use of NSAIDs, Fish Oil, and MV 5 days prior to procedure, per Dr Hayes protocol.  Tylenol based products are ok to take.  Pt " verbalized understanding.     I have explained a positive cologuard indicates the presence of DNA/hgb in stool that is associated with colon polyps or colon cancer.  There is a chance the test is a false positive with that chance being higher for people over the age of 65. This is due to normal changes in DNA associated with getting older (AGA).  Due to the positive result of the Cologuard I recommend pt undergoing colonoscopy.  Colonoscopy remains the gold standard for detection of colon polyps/colon cancer.      All risks, benefits, alternatives, and indications of colonoscopy procedure have been discussed with the patient. Risks to include perforation of the colon requiring possible surgery or colostomy, risk of bleeding from biopsies or removal of colon tissue, possibility of missing a colon polyp or cancer, or adverse drug reaction.  Benefits to include the diagnosis and management of disease of the colon and rectum.  Pt verbalizes understanding and agrees to proceed with procedure.              Ramos Wheatley, APRN  08/24/22            There are no Patient Instructions on file for this visit.

## 2022-08-25 PROBLEM — R19.5 POSITIVE COLORECTAL CANCER SCREENING USING DNA-BASED STOOL TEST: Status: ACTIVE | Noted: 2022-08-25

## 2022-09-26 DIAGNOSIS — G43.719 INTRACTABLE CHRONIC MIGRAINE WITHOUT AURA AND WITHOUT STATUS MIGRAINOSUS: ICD-10-CM

## 2022-09-26 RX ORDER — TOPIRAMATE 50 MG/1
TABLET, FILM COATED ORAL
Qty: 60 TABLET | Refills: 1 | Status: SHIPPED | OUTPATIENT
Start: 2022-09-26

## 2022-09-28 ENCOUNTER — LAB (OUTPATIENT)
Dept: LAB | Facility: HOSPITAL | Age: 47
End: 2022-09-28

## 2022-09-28 ENCOUNTER — TRANSCRIBE ORDERS (OUTPATIENT)
Dept: LAB | Facility: HOSPITAL | Age: 47
End: 2022-09-28

## 2022-09-28 DIAGNOSIS — I10 ESSENTIAL HYPERTENSION: ICD-10-CM

## 2022-09-28 DIAGNOSIS — Z79.899 ENCOUNTER FOR LONG-TERM (CURRENT) USE OF OTHER MEDICATIONS: ICD-10-CM

## 2022-09-28 DIAGNOSIS — Z79.899 ENCOUNTER FOR LONG-TERM (CURRENT) USE OF OTHER MEDICATIONS: Primary | ICD-10-CM

## 2022-09-28 DIAGNOSIS — Z98.84 S/P LAPAROSCOPIC SLEEVE GASTRECTOMY: ICD-10-CM

## 2022-09-28 DIAGNOSIS — R73.02 IMPAIRED GLUCOSE TOLERANCE: ICD-10-CM

## 2022-09-28 DIAGNOSIS — E55.9 VITAMIN D DEFICIENCY: ICD-10-CM

## 2022-09-28 LAB
25(OH)D3 SERPL-MCNC: 48.2 NG/ML (ref 30–100)
ALBUMIN SERPL-MCNC: 4.3 G/DL (ref 3.5–5.2)
ALBUMIN/GLOB SERPL: 2.3 G/DL
ALP SERPL-CCNC: 94 U/L (ref 39–117)
ALT SERPL W P-5'-P-CCNC: 13 U/L (ref 1–33)
AMPHET+METHAMPHET UR QL: NEGATIVE
AMPHETAMINES UR QL: NEGATIVE
ANION GAP SERPL CALCULATED.3IONS-SCNC: 9 MMOL/L (ref 5–15)
AST SERPL-CCNC: 18 U/L (ref 1–32)
BARBITURATES UR QL SCN: NEGATIVE
BENZODIAZ UR QL SCN: POSITIVE
BILIRUB SERPL-MCNC: 0.3 MG/DL (ref 0–1.2)
BUN SERPL-MCNC: 12 MG/DL (ref 6–20)
BUN/CREAT SERPL: 14.3 (ref 7–25)
BUPRENORPHINE SERPL-MCNC: NEGATIVE NG/ML
CALCIUM SPEC-SCNC: 9.3 MG/DL (ref 8.6–10.5)
CANNABINOIDS SERPL QL: NEGATIVE
CHLORIDE SERPL-SCNC: 104 MMOL/L (ref 98–107)
CHOLEST SERPL-MCNC: 143 MG/DL (ref 0–200)
CO2 SERPL-SCNC: 28 MMOL/L (ref 22–29)
COCAINE UR QL: NEGATIVE
CREAT SERPL-MCNC: 0.84 MG/DL (ref 0.57–1)
DEPRECATED RDW RBC AUTO: 43.4 FL (ref 37–54)
EGFRCR SERPLBLD CKD-EPI 2021: 86.9 ML/MIN/1.73
ERYTHROCYTE [DISTWIDTH] IN BLOOD BY AUTOMATED COUNT: 14 % (ref 12.3–15.4)
FERRITIN SERPL-MCNC: 22.2 NG/ML (ref 13–150)
GLOBULIN UR ELPH-MCNC: 1.9 GM/DL
GLUCOSE SERPL-MCNC: 74 MG/DL (ref 65–99)
HBA1C MFR BLD: 5 % (ref 4.8–5.6)
HCT VFR BLD AUTO: 40.7 % (ref 34–46.6)
HDLC SERPL-MCNC: 52 MG/DL (ref 40–60)
HGB BLD-MCNC: 13.2 G/DL (ref 12–15.9)
IRON 24H UR-MRATE: 92 MCG/DL (ref 37–145)
IRON SATN MFR SERPL: 21 % (ref 20–50)
LDLC SERPL CALC-MCNC: 70 MG/DL (ref 0–100)
LDLC/HDLC SERPL: 1.3 {RATIO}
MCH RBC QN AUTO: 27.6 PG (ref 26.6–33)
MCHC RBC AUTO-ENTMCNC: 32.4 G/DL (ref 31.5–35.7)
MCV RBC AUTO: 85 FL (ref 79–97)
METHADONE UR QL SCN: NEGATIVE
OPIATES UR QL: NEGATIVE
OXYCODONE UR QL SCN: NEGATIVE
PCP UR QL SCN: NEGATIVE
PLATELET # BLD AUTO: 286 10*3/MM3 (ref 140–450)
PMV BLD AUTO: 9.5 FL (ref 6–12)
POTASSIUM SERPL-SCNC: 3.9 MMOL/L (ref 3.5–5.2)
PROPOXYPH UR QL: NEGATIVE
PROT SERPL-MCNC: 6.2 G/DL (ref 6–8.5)
RBC # BLD AUTO: 4.79 10*6/MM3 (ref 3.77–5.28)
SODIUM SERPL-SCNC: 141 MMOL/L (ref 136–145)
TIBC SERPL-MCNC: 448 MCG/DL (ref 298–536)
TRANSFERRIN SERPL-MCNC: 301 MG/DL (ref 200–360)
TRICYCLICS UR QL SCN: NEGATIVE
TRIGL SERPL-MCNC: 116 MG/DL (ref 0–150)
VLDLC SERPL-MCNC: 21 MG/DL (ref 5–40)
WBC NRBC COR # BLD: 8.74 10*3/MM3 (ref 3.4–10.8)

## 2022-09-28 PROCEDURE — 80306 DRUG TEST PRSMV INSTRMNT: CPT

## 2022-09-28 PROCEDURE — 36415 COLL VENOUS BLD VENIPUNCTURE: CPT

## 2022-09-28 PROCEDURE — 80053 COMPREHEN METABOLIC PANEL: CPT

## 2022-09-28 PROCEDURE — 80061 LIPID PANEL: CPT

## 2022-09-28 PROCEDURE — 84466 ASSAY OF TRANSFERRIN: CPT

## 2022-09-28 PROCEDURE — 83036 HEMOGLOBIN GLYCOSYLATED A1C: CPT

## 2022-09-28 PROCEDURE — 82306 VITAMIN D 25 HYDROXY: CPT

## 2022-09-28 PROCEDURE — 82728 ASSAY OF FERRITIN: CPT

## 2022-09-28 PROCEDURE — 85027 COMPLETE CBC AUTOMATED: CPT

## 2022-09-28 PROCEDURE — 83540 ASSAY OF IRON: CPT

## 2022-10-11 DIAGNOSIS — I10 ESSENTIAL HYPERTENSION: ICD-10-CM

## 2022-10-11 RX ORDER — LISINOPRIL 20 MG/1
TABLET ORAL
Qty: 90 TABLET | Refills: 1 | Status: SHIPPED | OUTPATIENT
Start: 2022-10-11

## 2022-10-13 DIAGNOSIS — K21.9 GASTROESOPHAGEAL REFLUX DISEASE WITHOUT ESOPHAGITIS: ICD-10-CM

## 2022-10-13 RX ORDER — FAMOTIDINE 20 MG/1
20 TABLET, FILM COATED ORAL DAILY PRN
Qty: 30 TABLET | Refills: 1 | Status: SHIPPED | OUTPATIENT
Start: 2022-10-13

## 2022-10-18 DIAGNOSIS — K21.9 GASTROESOPHAGEAL REFLUX DISEASE, UNSPECIFIED WHETHER ESOPHAGITIS PRESENT: ICD-10-CM

## 2022-10-18 RX ORDER — PANTOPRAZOLE SODIUM 40 MG/1
40 TABLET, DELAYED RELEASE ORAL DAILY
Qty: 90 TABLET | Refills: 1 | Status: SHIPPED | OUTPATIENT
Start: 2022-10-18

## 2022-11-17 DIAGNOSIS — K59.00 CONSTIPATION, UNSPECIFIED CONSTIPATION TYPE: ICD-10-CM

## 2022-11-17 RX ORDER — DOCUSATE SODIUM 100 MG/1
100 CAPSULE, LIQUID FILLED ORAL 2 TIMES DAILY
Qty: 60 CAPSULE | Refills: 2 | Status: SHIPPED | OUTPATIENT
Start: 2022-11-17 | End: 2023-01-24

## 2023-01-24 DIAGNOSIS — Z30.41 ENCOUNTER FOR SURVEILLANCE OF CONTRACEPTIVE PILLS: ICD-10-CM

## 2023-01-24 DIAGNOSIS — K59.00 CONSTIPATION, UNSPECIFIED CONSTIPATION TYPE: ICD-10-CM

## 2023-01-24 RX ORDER — DOCUSATE SODIUM 100 MG/1
100 CAPSULE, LIQUID FILLED ORAL 2 TIMES DAILY
Qty: 60 CAPSULE | Refills: 2 | Status: SHIPPED | OUTPATIENT
Start: 2023-01-24

## 2023-01-24 RX ORDER — ACETAMINOPHEN AND CODEINE PHOSPHATE 120; 12 MG/5ML; MG/5ML
SOLUTION ORAL
Qty: 84 TABLET | Refills: 4 | OUTPATIENT
Start: 2023-01-24

## 2023-03-27 DIAGNOSIS — Z30.41 ENCOUNTER FOR SURVEILLANCE OF CONTRACEPTIVE PILLS: ICD-10-CM

## 2023-03-27 RX ORDER — ACETAMINOPHEN AND CODEINE PHOSPHATE 120; 12 MG/5ML; MG/5ML
SOLUTION ORAL
Qty: 84 TABLET | Refills: 4 | OUTPATIENT
Start: 2023-03-27

## 2023-04-04 DIAGNOSIS — Z30.41 ENCOUNTER FOR SURVEILLANCE OF CONTRACEPTIVE PILLS: ICD-10-CM

## 2023-04-05 RX ORDER — ACETAMINOPHEN AND CODEINE PHOSPHATE 120; 12 MG/5ML; MG/5ML
SOLUTION ORAL
Qty: 84 TABLET | Refills: 4 | OUTPATIENT
Start: 2023-04-05

## 2023-04-14 ENCOUNTER — APPOINTMENT (OUTPATIENT)
Dept: GENERAL RADIOLOGY | Facility: HOSPITAL | Age: 48
End: 2023-04-14
Payer: COMMERCIAL

## 2023-04-14 ENCOUNTER — HOSPITAL ENCOUNTER (EMERGENCY)
Facility: HOSPITAL | Age: 48
Discharge: HOME | End: 2023-04-14
Attending: STUDENT IN AN ORGANIZED HEALTH CARE EDUCATION/TRAINING PROGRAM | Admitting: STUDENT IN AN ORGANIZED HEALTH CARE EDUCATION/TRAINING PROGRAM
Payer: COMMERCIAL

## 2023-04-14 VITALS
WEIGHT: 220 LBS | HEIGHT: 62 IN | TEMPERATURE: 97.5 F | RESPIRATION RATE: 13 BRPM | OXYGEN SATURATION: 98 % | SYSTOLIC BLOOD PRESSURE: 131 MMHG | DIASTOLIC BLOOD PRESSURE: 55 MMHG | HEART RATE: 63 BPM | BODY MASS INDEX: 40.48 KG/M2

## 2023-04-14 DIAGNOSIS — S82.65XA CLOSED NONDISPLACED FRACTURE OF LATERAL MALLEOLUS OF LEFT FIBULA, INITIAL ENCOUNTER: Primary | ICD-10-CM

## 2023-04-14 PROCEDURE — 25010000002 KETOROLAC TROMETHAMINE PER 15 MG: Performed by: STUDENT IN AN ORGANIZED HEALTH CARE EDUCATION/TRAINING PROGRAM

## 2023-04-14 PROCEDURE — 96372 THER/PROPH/DIAG INJ SC/IM: CPT

## 2023-04-14 PROCEDURE — 73610 X-RAY EXAM OF ANKLE: CPT

## 2023-04-14 PROCEDURE — 73630 X-RAY EXAM OF FOOT: CPT

## 2023-04-14 PROCEDURE — 99283 EMERGENCY DEPT VISIT LOW MDM: CPT

## 2023-04-14 RX ORDER — NALOXONE HYDROCHLORIDE 4 MG/.1ML
SPRAY NASAL
Qty: 2 EACH | Refills: 0 | Status: SHIPPED | OUTPATIENT
Start: 2023-04-14

## 2023-04-14 RX ORDER — KETOROLAC TROMETHAMINE 15 MG/ML
15 INJECTION, SOLUTION INTRAMUSCULAR; INTRAVENOUS ONCE
Status: COMPLETED | OUTPATIENT
Start: 2023-04-14 | End: 2023-04-14

## 2023-04-14 RX ORDER — HYDROCODONE BITARTRATE AND ACETAMINOPHEN 5; 325 MG/1; MG/1
1 TABLET ORAL 4 TIMES DAILY PRN
Qty: 9 TABLET | Refills: 0 | Status: SHIPPED | OUTPATIENT
Start: 2023-04-14 | End: 2023-04-17

## 2023-04-14 RX ORDER — DULOXETIN HYDROCHLORIDE 60 MG/1
120 CAPSULE, DELAYED RELEASE ORAL DAILY
COMMUNITY

## 2023-04-14 RX ADMIN — KETOROLAC TROMETHAMINE 15 MG: 15 INJECTION, SOLUTION INTRAMUSCULAR; INTRAVENOUS at 02:50

## 2023-04-14 NOTE — DISCHARGE INSTRUCTIONS
It was very nice to meet you, Maribel. Thank you for allowing us to take care of you today at [facility].    Your evaluation today did not show any emergent findings or have any emergent indications for admission to the hospital.  Please follow-up with the orthopedic surgery [place] and use the boot for further comfort.  Please use crutches as well try to avoid weightbearing until you see the orthopedic surgery team.    Please understand that an ER evaluation is just the start of your evaluation. We will do what we can, but we are often unable to fully figure out what is causing your symptoms from one evaluation. Thus, our primary goal is to determine whether you need to be evaluated in the hospital or if it is safe for you to go home and see other doctors such as a primary care physician or a specialist on an outpatient basis.     Like we discussed, it is VERY IMPORTANT that you follow up with your primary care doctor (call them to set up an appointment) within the next few days or as soon as possible so that you can be re-evaluated for improvement in your symptoms or for any other questions.     A copy of your results should be included in your paperwork. If you were prescribed any medications, please take them as directed or call us back with any questions.    Please return to the emergency room within 12-48 hours if you experience fever, chills, chest pain or shortness of breath, pain with inspiration/expiration, pain that travels to your arms, neck or back, nausea, vomiting, severe headache, tearing pain in your chest, dizziness, feel as though you are about to pass out, have any worsening symptoms, or any other concerns.    *IMPORTANT INFORMATION REGARDING XRAYS AND CT SCANS*  Please keep in mind that at nighttime we do not have an in-house radiologist and use a tele-radiology service. This means that while they provide us with information on emergent findings or important acute findings, they may  not report to us ALL of the other smaller, yet still important to know, findings that may be there on your imaging studies. While we will try our best to inform you about any incidental findings or abnormal findings that require follow up, please follow up with your primary care provider to have your imaging studies reviewed formally and have any abnormal findings addressed.

## 2023-04-19 NOTE — ED PROVIDER NOTES
Subjective   History of Present Illness  Patient states that she woke up and went to stand and rolled her ankle.  States that she did not hit her head or lose consciousness.  States that has not been able to walk since then.        Review of Systems   All other systems reviewed and are negative.      Past Medical History:   Diagnosis Date   • Acid reflux    • ADHD (attention deficit hyperactivity disorder)    • Allergic    • Anxiety    • Arthritis    • Depression    • Diabetes mellitus     Pre diabetic   • Fibromyalgia    • Fibromyalgia, primary    • HL (hearing loss)     Meniere's disease   • Hypertension    • Low back pain    • Migraines    • Neck ache        Allergies   Allergen Reactions   • Iron GI Intolerance       Past Surgical History:   Procedure Laterality Date   •  SECTION     • CHOLECYSTECTOMY     • GASTRIC SLEEVE LAPAROSCOPIC  2019       Family History   Problem Relation Age of Onset   • Diabetes Mother         Tyoe 2   • Obesity Mother    • Osteoporosis Mother    • Kidney cancer Mother    • Anxiety disorder Mother    • Heart disease Father    • Diabetes Father         Type 2   • Obesity Father    • Hearing loss Father         Menieres   • Breast cancer Maternal Aunt 65   • Osteoporosis Maternal Grandmother    • Ovarian cancer Neg Hx    • Uterine cancer Neg Hx    • Colon cancer Neg Hx    • Melanoma Neg Hx    • Colon polyps Neg Hx    • Esophageal cancer Neg Hx        Social History     Socioeconomic History   • Marital status:    Tobacco Use   • Smoking status: Never   • Smokeless tobacco: Never   Vaping Use   • Vaping Use: Never used   Substance and Sexual Activity   • Alcohol use: No   • Drug use: No   • Sexual activity: Yes     Partners: Male     Birth control/protection: OCP           Objective   Physical Exam  Vitals and nursing note reviewed.   Constitutional:       General: She is not in acute distress.     Appearance: Normal appearance. She is not toxic-appearing or  diaphoretic.   HENT:      Head: Normocephalic and atraumatic.      Right Ear: External ear normal.      Left Ear: External ear normal.      Nose: Nose normal.      Mouth/Throat:      Mouth: Mucous membranes are moist.   Eyes:      General:         Right eye: No discharge.         Left eye: No discharge.      Extraocular Movements: Extraocular movements intact.      Conjunctiva/sclera: Conjunctivae normal.   Cardiovascular:      Rate and Rhythm: Normal rate.   Pulmonary:      Effort: Pulmonary effort is normal. No respiratory distress.      Breath sounds: No rhonchi.   Abdominal:      General: Abdomen is flat.      Tenderness: There is no abdominal tenderness. There is no guarding or rebound.   Musculoskeletal:         General: Swelling (to left ankle lateral malleolar area. 2+ DP and PT palpable. Sensation intact.), tenderness, deformity and signs of injury present.   Skin:     General: Skin is warm.      Capillary Refill: Capillary refill takes less than 2 seconds.      Coloration: Skin is not jaundiced.   Neurological:      Mental Status: She is alert and oriented to person, place, and time. Mental status is at baseline.      Sensory: No sensory deficit.      Motor: No weakness.   Psychiatric:         Mood and Affect: Mood normal.         Behavior: Behavior normal.         Thought Content: Thought content normal.         Judgment: Judgment normal.         Procedures           ED Course                                           Medical Decision Making  This is a 47-year-old female presenting today with a mechanical fall and likely an injury to her ankle.  Differentials include fracture, dislocation, sprain.  Patient's x-rays were reviewed and found have a distal malleoli are fracture without significant displacement.  She was placed in a walking boot and was given crutches.  I will have her use nonweightbearing and have her follow-up with orthopedic surgery for further evaluation.  She was given other commonsense  return precautions which she verbalized understanding of and agreed with.  She was discharged in stable condition with pain medication and recommendations to follow-up with orthopedic surgery.    Closed nondisplaced fracture of lateral malleolus of left fibula, initial encounter: complicated acute illness or injury  Amount and/or Complexity of Data Reviewed  Radiology: ordered.      Risk  Prescription drug management.          Final diagnoses:   Closed nondisplaced fracture of lateral malleolus of left fibula, initial encounter       ED Disposition  ED Disposition     ED Disposition   Discharge    Condition   Stable    Comment   --             Miguel Mckenzie,   [address]  [place] [facility] [address]  [phone]    Call in 1 day  As needed, If symptoms worsen    Dionisio Morales MD  [address]   [facility] [id number] [phone]    Call in 1 day  As needed, If symptoms worsen         Medication List      New Prescriptions    naloxone 4 MG/0.1ML nasal spray  Commonly known as: NARCAN  CALL 911. Do not prime. Spray into nostril upon signs of opioid overdose. May repeat in 2 to 3 minutes in opposite nostril if no or minimal breathing and responsiveness, then as needed (if doses are available) every 2 to 3 minutes.        ASK your doctor about these medications    HYDROcodone-acetaminophen 5-325 MG per tablet  Commonly known as: NORCO  Take 1 tablet by mouth 4 (Four) Times a Day As Needed for Mild Pain for up to 3 days.  Ask about: Should I take this medication?           Where to Get Your Medications      These medications were sent to [facility]/pharmacy #[id number] - [place], [place] - [address] - [phone]  - [phone]   [address] [place] [address]    Phone: [phone]   · HYDROcodone-acetaminophen 5-325 MG per tablet  · naloxone 4 MG/0.1ML nasal spray          Wilber Gutierrez MD  04/18/23 1372

## 2023-05-05 DIAGNOSIS — K21.9 GASTROESOPHAGEAL REFLUX DISEASE, UNSPECIFIED WHETHER ESOPHAGITIS PRESENT: ICD-10-CM

## 2023-05-05 RX ORDER — PANTOPRAZOLE SODIUM 40 MG/1
40 TABLET, DELAYED RELEASE ORAL DAILY
Qty: 90 TABLET | Refills: 1 | Status: SHIPPED | OUTPATIENT
Start: 2023-05-05

## 2023-05-05 NOTE — TELEPHONE ENCOUNTER
Caller: Maribel Whitaker    Relationship: Self    Best call back number: [phone]    Requested Prescriptions:   Requested Prescriptions     Pending Prescriptions Disp Refills   • pantoprazole (PROTONIX) 40 MG EC tablet 90 tablet 1     Sig: Take 1 tablet by mouth Daily.        Pharmacy where request should be sent: [id number] [place] [facility] [address] [phone] [facility] [phone]      Last office visit with prescribing clinician: 8/16/2022   Last telemedicine visit with prescribing clinician: 6/14/2023   Next office visit with prescribing clinician: 6/14/2023     Additional details provided by patient: PATIENT IS COMPLETELY OUT OF MEDICATION    Does the patient have less than a 3 day supply:  [x] Yes  [] No    Would you like a call back once the refill request has been completed: [x] Yes [] No    If the office needs to give you a call back, can they leave a voicemail: [x] Yes [] No    Ollie Tolliver Rep   05/05/23 13:36 CDT

## 2023-05-10 DIAGNOSIS — I10 ESSENTIAL HYPERTENSION: ICD-10-CM

## 2023-05-10 RX ORDER — LISINOPRIL 20 MG/1
20 TABLET ORAL DAILY
Qty: 90 TABLET | Refills: 1 | Status: SHIPPED | OUTPATIENT
Start: 2023-05-10

## 2023-05-10 NOTE — TELEPHONE ENCOUNTER
Rx Refill Note  Requested Prescriptions     Pending Prescriptions Disp Refills   • lisinopril (PRINIVIL,ZESTRIL) 20 MG tablet 90 tablet 1     Sig: Take 1 tablet by mouth Daily.      Last office visit with prescribing clinician: 8/16/2022   Last telemedicine visit with prescribing clinician: 5/5/2023   Next office visit with prescribing clinician: 6/14/2023                         Would you like a call back once the refill request has been completed: [] Yes [] No    If the office needs to give you a call back, can they leave a voicemail: [] Yes [] No    Morgan Venegas MA  05/10/23, 10:00 CDT

## 2023-05-11 DIAGNOSIS — I10 ESSENTIAL HYPERTENSION: ICD-10-CM

## 2023-05-11 NOTE — TELEPHONE ENCOUNTER
Caller: Maribel Whitaker    Relationship: Self    Best call back number: [phone]    Requested Prescriptions:   Requested Prescriptions     Pending Prescriptions Disp Refills   • lisinopril (PRINIVIL,ZESTRIL) 20 MG tablet 90 tablet 1     Sig: Take 1 tablet by mouth Daily.        Pharmacy where request should be sent: [facility]/PHARMACY #[id number] - [place], [place] - [address] [phone] [facility] [phone]      Last office visit with prescribing clinician: 8/16/2022   Last telemedicine visit with prescribing clinician: 5/10/2023   Next office visit with prescribing clinician: 6/14/2023     Additional details provided by patient: PATIENT HAS 3 DAYS LEFT OF MEDICATION    Does the patient have less than a 3 day supply:  [] Yes  [x] No    Would you like a call back once the refill request has been completed: [x] Yes [] No    If the office needs to give you a call back, can they leave a voicemail: [x] Yes [] No    Ollie Tolliver Rep   05/11/23 14:33 CDT

## 2023-05-12 RX ORDER — LISINOPRIL 20 MG/1
20 TABLET ORAL DAILY
Qty: 90 TABLET | Refills: 1 | OUTPATIENT
Start: 2023-05-12

## 2023-05-18 DIAGNOSIS — Z30.41 ENCOUNTER FOR SURVEILLANCE OF CONTRACEPTIVE PILLS: ICD-10-CM

## 2023-05-18 RX ORDER — ACETAMINOPHEN AND CODEINE PHOSPHATE 120; 12 MG/5ML; MG/5ML
1 SOLUTION ORAL DAILY
Qty: 28 TABLET | Refills: 12 | OUTPATIENT
Start: 2023-05-18

## 2023-05-19 NOTE — TELEPHONE ENCOUNTER
Caller: Maribel Whitaker    Relationship: Self    Best call back number: [phone]    Requested Prescriptions:   Requested Prescriptions     Pending Prescriptions Disp Refills   • metFORMIN (GLUCOPHAGE) 500 MG tablet 30 tablet 11     Sig: Take 1 tablet by mouth Daily With Breakfast.        Pharmacy where request should be sent: [id number] [place] [facility] [place] [facility] [address] [phone] [facility] [phone]      Last office visit with prescribing clinician: 8/16/2022   Last telemedicine visit with prescribing clinician: 5/11/2023   Next office visit with prescribing clinician: 6/14/2023     Additional details provided by patient:   PATIENT IS OUT OF MEDICATION     Does the patient have less than a 3 day supply:  [x] Yes  [] No    Would you like a call back once the refill request has been completed: [x] Yes [] No    If the office needs to give you a call back, can they leave a voicemail: [x] Yes [] No    [place], PCT   05/19/23 11:49 CDT

## 2023-05-26 DIAGNOSIS — K59.00 CONSTIPATION, UNSPECIFIED CONSTIPATION TYPE: ICD-10-CM

## 2023-05-26 RX ORDER — DOCUSATE SODIUM 100 MG/1
100 CAPSULE, LIQUID FILLED ORAL 2 TIMES DAILY
Qty: 60 CAPSULE | Refills: 2 | Status: SHIPPED | OUTPATIENT
Start: 2023-05-26

## 2023-06-19 ENCOUNTER — TELEPHONE (OUTPATIENT)
Dept: NEUROLOGY | Facility: CLINIC | Age: 48
End: 2023-06-19
Payer: COMMERCIAL

## 2023-07-10 PROBLEM — F33.1 MODERATE EPISODE OF RECURRENT MAJOR DEPRESSIVE DISORDER: Status: ACTIVE | Noted: 2021-05-07

## 2023-07-18 ENCOUNTER — OFFICE VISIT (OUTPATIENT)
Dept: NEUROLOGY | Facility: CLINIC | Age: 48
End: 2023-07-18
Payer: COMMERCIAL

## 2023-07-18 VITALS
SYSTOLIC BLOOD PRESSURE: 116 MMHG | HEART RATE: 91 BPM | DIASTOLIC BLOOD PRESSURE: 84 MMHG | WEIGHT: 228.4 LBS | BODY MASS INDEX: 42.03 KG/M2 | HEIGHT: 62 IN | OXYGEN SATURATION: 95 %

## 2023-07-18 DIAGNOSIS — G43.909 EPISODIC MIGRAINE: ICD-10-CM

## 2023-07-18 DIAGNOSIS — G43.719 INTRACTABLE CHRONIC MIGRAINE WITHOUT AURA AND WITHOUT STATUS MIGRAINOSUS: Primary | ICD-10-CM

## 2023-07-18 RX ORDER — ATOGEPANT 60 MG/1
60 TABLET ORAL DAILY
Qty: 30 TABLET | Refills: 5 | Status: SHIPPED | OUTPATIENT
Start: 2023-07-18 | End: 2024-01-14

## 2023-07-18 RX ORDER — ESKETAMINE HYDROCHLORIDE 28 MG/.2ML
84 SOLUTION NASAL 2 TIMES WEEKLY
COMMUNITY
Start: 2023-07-14

## 2023-07-18 NOTE — PROGRESS NOTES
Chief Complaint    Subjective        Maribel Whitaker presents to [facility]    History of Present Illness  47-year-old female here for follow-up.  She has about 3 migraines per month but this increases during the winter.  Nurtec helps occasionally.  She has been off Emgality consider other medications for a few months as she has been dealing with significant depression.    Past Medical History:   Diagnosis Date    Acid reflux     ADHD (attention deficit hyperactivity disorder)     Allergic     Anxiety     Arthritis     Depression     Diabetes mellitus 2020    Pre diabetic    Fibromyalgia     Fibromyalgia, primary     HL (hearing loss) 2020    Meniere's disease    Hypertension     Low back pain 2012    Migraines     Neck ache           Current Outpatient Medications:     ALPRAZolam (XANAX) 1 MG tablet, Take 1 tablet by mouth As Needed., Disp: , Rfl:     baclofen (LIORESAL) 10 MG tablet, 1 tablet 3 (Three) Times a Day., Disp: , Rfl:     Blood Glucose Monitoring Suppl (FreeStyle Lite) device, , Disp: , Rfl:     cetirizine (zyrTEC) 10 MG tablet, Take 1 tablet by mouth Daily., Disp: , Rfl:     Cholecalciferol (Vitamin D3) 50 MCG (2000 UT) capsule, TAKE 1 CAPSULE BY MOUTH EVERY DAY, Disp: 30 capsule, Rfl: 11    [employer] Lancets Micro Thin 33G misc, 2 (Two) Times a Day., Disp: , Rfl:     DULoxetine (CYMBALTA) 60 MG capsule, Take 2 capsules by mouth Daily., Disp: , Rfl:     Ferrous Sulfate (IRON PO), Take 2 tablets by mouth Daily., Disp: , Rfl:     fluticasone (FLONASE) 50 MCG/ACT nasal spray, 2 sprays into the nostril(s) as directed by provider Daily., Disp: 16 g, Rfl: 1    gabapentin (NEURONTIN) 300 MG capsule, 1 capsule 3 (Three) Times a Day., Disp: , Rfl:     galcanezumab-gnlm (EMGALITY) 120 MG/ML auto-injector pen, Inject 1 mL under the skin into the appropriate area as directed Every 30 (Thirty) Days., Disp: 1.12 mL, Rfl: 11    glucose blood (FREESTYLE LITE) test strip, Use for glucose  "testing daily., Disp: 100 each, Rfl: 11    lamoTRIgine (LaMICtal) 150 MG tablet, Take 1 tablet by mouth 2 (Two) Times a Day., Disp: , Rfl:     Lancets (freestyle) lancets, USE FOR TESTING ONCE A DAY, Disp: 100 each, Rfl: 3    lisinopril (PRINIVIL,ZESTRIL) 20 MG tablet, Take 1 tablet by mouth Daily., Disp: 90 tablet, Rfl: 1    metFORMIN (GLUCOPHAGE) 500 MG tablet, Take 1 tablet by mouth Daily With Breakfast., Disp: 30 tablet, Rfl: 5    naloxone (NARCAN) 4 MG/0.1ML nasal spray, CALL 911. Do not prime. Spray into nostril upon signs of opioid overdose. May repeat in 2 to 3 minutes in opposite nostril if no or minimal breathing and responsiveness, then as needed (if doses are available) every 2 to 3 minutes., Disp: 2 each, Rfl: 0    norethindrone (MICRONOR) 0.35 MG tablet, Take 1 tablet by mouth Daily., Disp: 28 tablet, Rfl: 12    pantoprazole (PROTONIX) 40 MG EC tablet, Take 1 tablet by mouth Daily., Disp: 90 tablet, Rfl: 1    Rimegepant Sulfate (Nurtec) 75 MG tablet dispersible tablet, Take 1 tablet by mouth 1 (One) Time As Needed (migraine) for up to 1 dose., Disp: 16 tablet, Rfl: 5    Spravato, 84 MG Dose, Nasal Solution, 6 sprays into the nostril(s) as directed by provider 2 (Two) Times a Week., Disp: , Rfl:     traMADol (ULTRAM) 50 MG tablet, Take 1 tablet by mouth 2 (Two) Times a Day., Disp: , Rfl:     traZODone (DESYREL) 150 MG tablet, Take 1 tablet by mouth Every Night., Disp: , Rfl:        Objective   Vital Signs:   /84 (BP Location: Left arm, Patient Position: Sitting, Cuff Size: Large Adult)   Pulse 91   Ht 157.5 cm (62\")   Wt 104 kg (228 lb 6.4 oz)   SpO2 95%   BMI 41.77 kg/m²     Physical Exam  Constitutional:       General: She is awake.   Eyes:      Extraocular Movements: Extraocular movements intact.      Pupils: Pupils are equal, round, and reactive to light.   Neurological:      Mental Status: She is alert.      Motor: Motor strength is normal.      Deep Tendon Reflexes: Reflexes are normal " and symmetric.   Psychiatric:         Speech: Speech normal.      Neurological Exam  Mental Status  Awake and alert. Oriented to person, place and time. Recent and remote memory are intact. Speech is normal. Language is fluent with no aphasia. Attention and concentration are normal. Fund of knowledge is appropriate for level of education.    Cranial Nerves  CN II: Visual fields full to confrontation.  CN III, IV, VI: Extraocular movements intact bilaterally. Pupils equal round and reactive to light bilaterally.  CN V: Facial sensation is normal.  CN VII: Full and symmetric facial movement.  CN IX, X: Palate elevates symmetrically  CN XI: Shoulder shrug strength is normal.  CN XII: Tongue midline without atrophy or fasciculations.    Motor  Normal muscle bulk throughout. Normal muscle tone. No abnormal involuntary movements. Strength is 5/5 throughout all four extremities.    Sensory  Light touch is normal in upper and lower extremities.   Tinel's at both wrists.    Reflexes  Deep tendon reflexes are 2+ and symmetric in all four extremities.    Coordination  Right: Finger-to-nose normal.Left: Finger-to-nose normal.    Gait  Casual gait is normal including stance, stride, and arm swing.    Result Review :                     Assessment and Plan   47 yo female with headaches. Already on cymbalta, gabapentin, lamictal, tramadol. Has HTN so would not try triptans.  On lisinopril.  No reduction in frequency with Topamax.  Benefit with Nurtec.  Her numbness in hands may be CTS, Tinel's +. Will start with wrist splints.  Emgality helped some but still having multiple migraines per month.    Plan:     1.  Start Qulipta.  2.  Can continue Nurtec, but will trial Ubrelvy to see if this helps better.  3.  Follow-up 3 months.      Follow Up   No follow-ups on file.  Patient was given instructions and counseling regarding her condition or for health maintenance advice. Please see specific information pulled into the AVS if  appropriate.

## 2023-07-25 ENCOUNTER — OFFICE VISIT (OUTPATIENT)
Dept: OBSTETRICS AND GYNECOLOGY | Facility: CLINIC | Age: 48
End: 2023-07-25
Payer: COMMERCIAL

## 2023-07-25 VITALS
DIASTOLIC BLOOD PRESSURE: 86 MMHG | WEIGHT: 223 LBS | HEIGHT: 62 IN | SYSTOLIC BLOOD PRESSURE: 118 MMHG | BODY MASS INDEX: 41.04 KG/M2

## 2023-07-25 DIAGNOSIS — N94.10 FEMALE DYSPAREUNIA: ICD-10-CM

## 2023-07-25 DIAGNOSIS — Z01.419 WOMEN'S ANNUAL ROUTINE GYNECOLOGICAL EXAMINATION: Primary | ICD-10-CM

## 2023-07-25 DIAGNOSIS — N92.1 MENORRHAGIA WITH IRREGULAR CYCLE: ICD-10-CM

## 2023-07-25 DIAGNOSIS — Z11.3 SCREENING FOR STD (SEXUALLY TRANSMITTED DISEASE): ICD-10-CM

## 2023-07-25 DIAGNOSIS — Z12.31 ENCOUNTER FOR SCREENING MAMMOGRAM FOR MALIGNANT NEOPLASM OF BREAST: ICD-10-CM

## 2023-07-25 DIAGNOSIS — Z30.41 ENCOUNTER FOR SURVEILLANCE OF CONTRACEPTIVE PILLS: ICD-10-CM

## 2023-07-25 PROCEDURE — 3074F SYST BP LT 130 MM HG: CPT | Performed by: NURSE PRACTITIONER

## 2023-07-25 PROCEDURE — 3079F DIAST BP 80-89 MM HG: CPT | Performed by: NURSE PRACTITIONER

## 2023-07-25 PROCEDURE — 1159F MED LIST DOCD IN RCRD: CPT | Performed by: NURSE PRACTITIONER

## 2023-07-25 PROCEDURE — 1160F RVW MEDS BY RX/DR IN RCRD: CPT | Performed by: NURSE PRACTITIONER

## 2023-07-25 PROCEDURE — 99396 PREV VISIT EST AGE 40-64: CPT | Performed by: NURSE PRACTITIONER

## 2023-07-25 RX ORDER — CHOLECALCIFEROL (VITAMIN D3) 50 MCG
1 TABLET ORAL DAILY
COMMUNITY
Start: 2023-07-21

## 2023-07-25 RX ORDER — ACETAMINOPHEN AND CODEINE PHOSPHATE 120; 12 MG/5ML; MG/5ML
1 SOLUTION ORAL DAILY
Qty: 28 TABLET | Refills: 12 | Status: SHIPPED | OUTPATIENT
Start: 2023-07-25

## 2023-07-25 NOTE — PROGRESS NOTES
Chief Complaint   Patient presents with    Gynecologic Exam     Patient is here for annual well GYN exam.  Last well GYN exam and pap 22, pap normal/-HPV. Last mammo 19 BIRADS Cat 2 benign.  Pt reports having periods still, at irregular intervals with menorrhagia, requesting to start back on hormone free OCP (Micronor) that she took prior.  Pt c/o dyspareunia with vaginal dryness. Pt denies current pelvic pain, urinary incontinence, abnormal vaginal discharge, and voices no other complaints.         History:  Maribel Whitaker is a 47 y.o. female who presents today for evaluation of the above problems.       Maribel Whitaker is a 47 y.o. female ,  who comes to the office today for annual GYN examination. Last menstrual period was 2023  and her last Pap smear was 2022, and was normal. She has no history of cervical dysplasia. She is currently not on hormonal contraception  and she would like to restart Micronor for menorrhagia.  Her medical history is reviewed.   Patient also complains of vaginal dryness with intercourse.        ROS:  Review of Systems   Constitutional: Negative.    HENT: Negative.     Eyes: Negative.    Respiratory: Negative.     Cardiovascular: Negative.    Gastrointestinal: Negative.    Endocrine: Negative.    Genitourinary: Negative.  Positive for dyspareunia and menstrual problem.   Musculoskeletal: Negative.    Skin: Negative.    Neurological: Negative.    Psychiatric/Behavioral: Negative.       Allergies   Allergen Reactions    Iron GI Intolerance     Past Medical History:   Diagnosis Date    Acid reflux     ADHD (attention deficit hyperactivity disorder)     Allergic     Anxiety     Arthritis     Depression     Diabetes mellitus     Pre diabetic    Fibromyalgia     Fibromyalgia, primary     HL (hearing loss)     Meniere's disease    Hypertension     Low back pain     Migraines     Neck ache      Past Surgical History:   Procedure Laterality Date      SECTION      CHOLECYSTECTOMY      GASTRIC SLEEVE LAPAROSCOPIC  02/2019    Dr. Yi     Family History   Problem Relation Age of Onset    Diabetes Mother         Tyoe 2    Obesity Mother     Osteoporosis Mother     Kidney cancer Mother     Anxiety disorder Mother     Heart disease Father     Diabetes Father         Type 2    Obesity Father     Hearing loss Father         Menieres    Breast cancer Maternal Aunt 65    Osteoporosis Maternal Grandmother     Ovarian cancer Neg Hx     Uterine cancer Neg Hx     Colon cancer Neg Hx     Melanoma Neg Hx     Colon polyps Neg Hx     Esophageal cancer Neg Hx       reports that she has never smoked. She has never been exposed to tobacco smoke. She has never used smokeless tobacco. She reports that she does not drink alcohol and does not use drugs.      Current Outpatient Medications:     ALPRAZolam (XANAX) 1 MG tablet, Take 1 tablet by mouth As Needed., Disp: , Rfl:     Atogepant (Qulipta) 60 MG tablet, Take 1 tablet by mouth Daily for 180 days., Disp: 30 tablet, Rfl: 5    baclofen (LIORESAL) 10 MG tablet, 1 tablet 3 (Three) Times a Day., Disp: , Rfl:     Blood Glucose Monitoring Suppl (FreeStyle Lite) device, , Disp: , Rfl:     cetirizine (zyrTEC) 10 MG tablet, Take 1 tablet by mouth Daily., Disp: , Rfl:     Cholecalciferol (Vitamin D) 50 MCG (2000 UT) tablet, Take 1 tablet by mouth Daily., Disp: , Rfl:     Cholecalciferol (Vitamin D3) 50 MCG (2000 UT) capsule, Take 1 capsule by mouth Daily., Disp: 30 capsule, Rfl: 11    [employer] Lancets Micro Thin 33G misc, 2 (Two) Times a Day., Disp: , Rfl:     DULoxetine (CYMBALTA) 60 MG capsule, Take 2 capsules by mouth Daily., Disp: , Rfl:     Ferrous Sulfate (IRON PO), Take 2 tablets by mouth Daily., Disp: , Rfl:     fluticasone (FLONASE) 50 MCG/ACT nasal spray, 2 sprays into the nostril(s) as directed by provider Daily., Disp: 16 g, Rfl: 1    gabapentin (NEURONTIN) 300 MG capsule, 1 capsule 3 (Three) Times a Day., Disp: , Rfl:     glucose  "blood (FREESTYLE LITE) test strip, Use for glucose testing daily., Disp: 100 each, Rfl: 11    lamoTRIgine (LaMICtal) 150 MG tablet, Take 1 tablet by mouth 2 (Two) Times a Day., Disp: , Rfl:     Lancets (freestyle) lancets, USE FOR TESTING ONCE A DAY, Disp: 100 each, Rfl: 3    lisinopril (PRINIVIL,ZESTRIL) 20 MG tablet, Take 1 tablet by mouth Daily., Disp: 90 tablet, Rfl: 1    metFORMIN (GLUCOPHAGE) 500 MG tablet, Take 1 tablet by mouth Daily With Breakfast., Disp: 30 tablet, Rfl: 5    naloxone (NARCAN) 4 MG/0.1ML nasal spray, CALL 911. Do not prime. Spray into nostril upon signs of opioid overdose. May repeat in 2 to 3 minutes in opposite nostril if no or minimal breathing and responsiveness, then as needed (if doses are available) every 2 to 3 minutes., Disp: 2 each, Rfl: 0    norethindrone (MICRONOR) 0.35 MG tablet, Take 1 tablet by mouth Daily., Disp: 28 tablet, Rfl: 12    pantoprazole (PROTONIX) 40 MG EC tablet, Take 1 tablet by mouth Daily., Disp: 90 tablet, Rfl: 1    Rimegepant Sulfate (Nurtec) 75 MG tablet dispersible tablet, Take 1 tablet by mouth 1 (One) Time As Needed (migraine) for up to 1 dose., Disp: 16 tablet, Rfl: 5    Spravato, 84 MG Dose, Nasal Solution, 6 sprays into the nostril(s) as directed by provider 2 (Two) Times a Week., Disp: , Rfl:     traMADol (ULTRAM) 50 MG tablet, Take 1 tablet by mouth 2 (Two) Times a Day., Disp: , Rfl:     traZODone (DESYREL) 150 MG tablet, Take 1 tablet by mouth Every Night., Disp: , Rfl:     ubrogepant (Ubrelvy) 100 MG tablet, Take 1 tablet by mouth 1 (One) Time As Needed (migraine) for up to 90 days., Disp: 16 tablet, Rfl: 2    OBJECTIVE:  /86   Ht 157.5 cm (62\")   Wt 101 kg (223 lb)   LMP 05/16/2023 (Approximate)   BMI 40.79 kg/m²    Physical Exam  Exam conducted with a chaperone present.   Constitutional:       Appearance: She is well-developed.   HENT:      Head: Normocephalic and atraumatic.   Eyes:      General: Lids are normal.      " Conjunctiva/sclera: Conjunctivae normal.      Pupils: Pupils are equal, round, and reactive to light.   Neck:      Thyroid: No thyromegaly.   Cardiovascular:      Rate and Rhythm: Normal rate and regular rhythm.      Heart sounds: Normal heart sounds.   Pulmonary:      Effort: Pulmonary effort is normal.      Breath sounds: Normal breath sounds.   Chest:   Breasts:     Breasts are symmetrical.      Right: No inverted nipple, mass, nipple discharge, skin change or tenderness.      Left: No inverted nipple, mass, nipple discharge, skin change or tenderness.   Abdominal:      General: Bowel sounds are normal.      Palpations: Abdomen is soft.   Genitourinary:     Exam position: Supine.      Labia:         Right: No rash, tenderness, lesion or injury.         Left: No rash, tenderness, lesion or injury.       Vagina: No signs of injury and foreign body. No vaginal discharge, erythema, tenderness or bleeding.      Cervix: No cervical motion tenderness, discharge or friability.      Uterus: Not deviated, not enlarged, not fixed and not tender.       Adnexa:         Right: No mass, tenderness or fullness.          Left: No mass, tenderness or fullness.        Rectum: Normal. No tenderness or external hemorrhoid.   Musculoskeletal:         General: Normal range of motion.      Cervical back: Normal range of motion and neck supple.   Skin:     General: Skin is warm and dry.   Neurological:      Mental Status: She is alert and oriented to person, place, and time.       Assessment/Plan    Diagnoses and all orders for this visit:    1. Women's annual routine gynecological examination (Primary)  Immunizations:      - Tetanus: Unknown or >10 years ago. Recommend to have at pharmacy or on injury.      - Influenza: recommended annually      - Pneumovax:once after age 65      - Prevnar: Once after age 65      - Zostavax: Once after age 60  Colon Cancer Screening: Due at 45  Mammogram: Order placed  PAP: due 2027  DEXA: DEXA scan at  65  COVID vaccine information is available at vaccine.[place].gov     2. Encounter for screening mammogram for malignant neoplasm of breast  -     Mammo Screening Digital Tomosynthesis Bilateral With CAD; Future    3. Encounter for surveillance of contraceptive pills  -     norethindrone (MICRONOR) 0.35 MG tablet; Take 1 tablet by mouth Daily.  Dispense: 28 tablet; Refill: 12    4. Menorrhagia with irregular cycle  Comments:  We will restart progesterone only OCP  Orders:  -     norethindrone (MICRONOR) 0.35 MG tablet; Take 1 tablet by mouth Daily.  Dispense: 28 tablet; Refill: 12    5. Screening for STD (sexually transmitted disease)  -     NuSwab VG+ & HSV    6. Female dyspareunia  Comments:  Discussed using Astroglide liquid for intercourse      I have refilled her birth control for a year.  We will notify her when the Pap smear results are available.  She will return in one year. In the meantime if she develops questions or problems, she will notify the office.      An After Visit Summary was printed and given to the patient at discharge.  Return in about 1 year (around 7/25/2024) for Annual physical.          Reyna Hurtado APRN 7/25/2023   Electronically signed

## 2023-07-28 LAB
A VAGINAE DNA VAG QL NAA+PROBE: NORMAL SCORE
BVAB2 DNA VAG QL NAA+PROBE: NORMAL SCORE
C ALBICANS DNA VAG QL NAA+PROBE: NEGATIVE
C GLABRATA DNA VAG QL NAA+PROBE: NEGATIVE
C TRACH DNA VAG QL NAA+PROBE: NEGATIVE
HSV1 DNA SPEC QL NAA+PROBE: NEGATIVE
HSV2 DNA SPEC QL NAA+PROBE: NEGATIVE
MEGA1 DNA VAG QL NAA+PROBE: NORMAL SCORE
N GONORRHOEA DNA VAG QL NAA+PROBE: NEGATIVE
T VAGINALIS DNA VAG QL NAA+PROBE: NEGATIVE

## 2023-08-07 ENCOUNTER — LAB (OUTPATIENT)
Dept: LAB | Facility: HOSPITAL | Age: 48
End: 2023-08-07
Payer: COMMERCIAL

## 2023-08-07 ENCOUNTER — TRANSCRIBE ORDERS (OUTPATIENT)
Dept: ADMINISTRATIVE | Facility: HOSPITAL | Age: 48
End: 2023-08-07
Payer: COMMERCIAL

## 2023-08-07 DIAGNOSIS — Z79.899 DRUG THERAPY: Primary | ICD-10-CM

## 2023-08-07 DIAGNOSIS — Z79.899 DRUG THERAPY: ICD-10-CM

## 2023-08-07 LAB
AMPHET+METHAMPHET UR QL: NEGATIVE
AMPHETAMINES UR QL: NEGATIVE
BARBITURATES UR QL SCN: NEGATIVE
BENZODIAZ UR QL SCN: POSITIVE
BUPRENORPHINE SERPL-MCNC: NEGATIVE NG/ML
CANNABINOIDS SERPL QL: NEGATIVE
COCAINE UR QL: NEGATIVE
FENTANYL UR-MCNC: NEGATIVE NG/ML
METHADONE UR QL SCN: NEGATIVE
OPIATES UR QL: NEGATIVE
OXYCODONE UR QL SCN: NEGATIVE
PCP UR QL SCN: NEGATIVE
PROPOXYPH UR QL: NEGATIVE
TRICYCLICS UR QL SCN: NEGATIVE

## 2023-08-07 PROCEDURE — 80307 DRUG TEST PRSMV CHEM ANLYZR: CPT

## 2023-08-27 DIAGNOSIS — K59.00 CONSTIPATION, UNSPECIFIED CONSTIPATION TYPE: ICD-10-CM

## 2023-08-28 RX ORDER — DOCUSATE SODIUM 100 MG/1
100 CAPSULE, LIQUID FILLED ORAL 2 TIMES DAILY
Qty: 60 CAPSULE | Refills: 2 | Status: SHIPPED | OUTPATIENT
Start: 2023-08-28

## 2023-08-31 ENCOUNTER — HOSPITAL ENCOUNTER (OUTPATIENT)
Dept: MAMMOGRAPHY | Facility: HOSPITAL | Age: 48
Discharge: HOME | End: 2023-08-31
Payer: COMMERCIAL

## 2023-11-02 DIAGNOSIS — K21.9 GASTROESOPHAGEAL REFLUX DISEASE, UNSPECIFIED WHETHER ESOPHAGITIS PRESENT: ICD-10-CM

## 2023-11-02 DIAGNOSIS — I10 ESSENTIAL HYPERTENSION: ICD-10-CM

## 2023-11-02 RX ORDER — PANTOPRAZOLE SODIUM 40 MG/1
40 TABLET, DELAYED RELEASE ORAL DAILY
Qty: 90 TABLET | Refills: 1 | Status: SHIPPED | OUTPATIENT
Start: 2023-11-02

## 2023-11-02 RX ORDER — LISINOPRIL 20 MG/1
20 TABLET ORAL DAILY
Qty: 90 TABLET | Refills: 1 | Status: SHIPPED | OUTPATIENT
Start: 2023-11-02

## 2023-11-20 DIAGNOSIS — K59.00 CONSTIPATION, UNSPECIFIED CONSTIPATION TYPE: ICD-10-CM

## 2023-11-20 RX ORDER — DOCUSATE SODIUM 100 MG/1
100 CAPSULE, LIQUID FILLED ORAL 2 TIMES DAILY
Qty: 60 CAPSULE | Refills: 2 | Status: SHIPPED | OUTPATIENT
Start: 2023-11-20

## 2023-12-04 DIAGNOSIS — E66.01 CLASS 3 SEVERE OBESITY DUE TO EXCESS CALORIES WITH SERIOUS COMORBIDITY AND BODY MASS INDEX (BMI) OF 40.0 TO 44.9 IN ADULT: ICD-10-CM

## 2024-01-01 NOTE — THERAPY DISCHARGE NOTE
Outpatient Physical Therapy Discharge Summary         Patient Name: Maribel Whitaker  : 1975  MRN: [id number]    Today's Date: 2018    Visit Dx:    ICD-10-CM ICD-9-CM   1. Neck pain M54.2 723.1             PT OP Goals     Row Name 18 1421          Long Term Goals    LTG Date to Achieve 18  -ZHANE     LTG 1 Full and symmetrical active cervical rotation  -ZHANE     LTG 1 Progress Not Met  -ZHANE     LTG 1 Progress Comments She is not in the clinic to formally assess, but it was last recorded that she continued to be most limited to the left.  -ZHANE     LTG 2 Cervical active extension to 75%  -ZHANE     LTG 2 Progress Met  -ZHANE     LTG 2 Progress Comments It was recorded on 18 as 34 degrees  -ZHANE     LTG 3 No neck pain except occasional minor twinges no more than 1-2/10  -ZHANE     LTG 3 Progress Not Met  -ZHANE     LTG 3 Progress Comments During her last appointment, which was nearly a month ago, her pain was a 5/10.  -ZHANE     LTG 4 Ind with HEP for posture and flexibility  -ZHANE     LTG 4 Progress Partially Met  -ZHANE     LTG 4 Progress Comments She verbalized independence on 18 appointment.  However, she continued to have forward shoulder posturing.  -ZHANE       User Key  (r) = Recorded By, (t) = Taken By, (c) = Cosigned By    Initials Name Provider Type    ZHANE Rhoades, HELLEN Physical Therapy Assistant          OP PT Discharge Summary  Date of Discharge: 18  Reason for Discharge: Unable to participate  Outcomes Achieved: Refer to plan of care for updates on goals achieved, Patient able to partially acheive established goals  Discharge Destination: Home without follow-up, Home with home program  Discharge Instructions/Additional Comments: Patient has had nearly a month gap since the last time she was at an appointment.  However, she has no-showed her last two visits and according to our attendance policy we have to discharge.  She has been participating in therapy since 18 and overall her  symptoms had been staying about the same.  The biggest component that was addressed in the clinic was her posture awareness and strengthening, and she was given a HEP to reflect this.      Time Calculation:                    Peter Rhoades, PTA  5/16/2018        None known

## 2024-01-19 ENCOUNTER — APPOINTMENT (OUTPATIENT)
Dept: GENERAL RADIOLOGY | Facility: HOSPITAL | Age: 49
End: 2024-01-19
Payer: COMMERCIAL

## 2024-01-19 PROCEDURE — 73030 X-RAY EXAM OF SHOULDER: CPT

## 2024-02-11 DIAGNOSIS — K59.00 CONSTIPATION, UNSPECIFIED CONSTIPATION TYPE: ICD-10-CM

## 2024-02-12 RX ORDER — DOCUSATE SODIUM 100 MG/1
100 CAPSULE, LIQUID FILLED ORAL 2 TIMES DAILY
Qty: 60 CAPSULE | Refills: 2 | Status: SHIPPED | OUTPATIENT
Start: 2024-02-12

## 2024-02-20 ENCOUNTER — OFFICE VISIT (OUTPATIENT)
Dept: NEUROLOGY | Facility: CLINIC | Age: 49
End: 2024-02-20
Payer: COMMERCIAL

## 2024-02-20 VITALS
SYSTOLIC BLOOD PRESSURE: 110 MMHG | DIASTOLIC BLOOD PRESSURE: 62 MMHG | HEIGHT: 62 IN | RESPIRATION RATE: 18 BRPM | BODY MASS INDEX: 41.41 KG/M2 | WEIGHT: 225 LBS | HEART RATE: 76 BPM

## 2024-02-20 DIAGNOSIS — G43.719 INTRACTABLE CHRONIC MIGRAINE WITHOUT AURA AND WITHOUT STATUS MIGRAINOSUS: Primary | ICD-10-CM

## 2024-02-20 PROCEDURE — 1160F RVW MEDS BY RX/DR IN RCRD: CPT | Performed by: PSYCHIATRY & NEUROLOGY

## 2024-02-20 PROCEDURE — 99214 OFFICE O/P EST MOD 30 MIN: CPT | Performed by: PSYCHIATRY & NEUROLOGY

## 2024-02-20 PROCEDURE — 3074F SYST BP LT 130 MM HG: CPT | Performed by: PSYCHIATRY & NEUROLOGY

## 2024-02-20 PROCEDURE — 1159F MED LIST DOCD IN RCRD: CPT | Performed by: PSYCHIATRY & NEUROLOGY

## 2024-02-20 PROCEDURE — 3078F DIAST BP <80 MM HG: CPT | Performed by: PSYCHIATRY & NEUROLOGY

## 2024-02-20 RX ORDER — ATOGEPANT 60 MG/1
60 TABLET ORAL DAILY
Qty: 30 TABLET | Refills: 5 | Status: SHIPPED | OUTPATIENT
Start: 2024-02-20

## 2024-02-20 NOTE — PROGRESS NOTES
Chief Complaint    Subjective        Maribel Whitaker presents to [facility]    History of Present Illness  48-year-old female here for follow-up.  Using Ubrelvy as needed, thinks this is better than the Nurtec.  We tried to start Qulipta at last visit but she does not think she was able to get this.      Past Medical History:   Diagnosis Date    Acid reflux     ADHD (attention deficit hyperactivity disorder)     Allergic     Anxiety     Arthritis     Depression     Diabetes mellitus 2020    Pre diabetic    Fibromyalgia     Fibromyalgia, primary     HL (hearing loss) 2020    Meniere's disease    Hypertension     Low back pain 2012    Migraines     Neck ache           Current Outpatient Medications:     ALPRAZolam (XANAX) 0.5 MG tablet, Take 1 tablet by mouth 3 (Three) Times a Day As Needed., Disp: , Rfl:     baclofen (LIORESAL) 10 MG tablet, 1 tablet 3 (Three) Times a Day., Disp: , Rfl:     Blood Glucose Monitoring Suppl (FreeStyle Lite) device, , Disp: , Rfl:     cetirizine (zyrTEC) 10 MG tablet, Take 1 tablet by mouth Daily., Disp: , Rfl:     Cholecalciferol (Vitamin D) 50 MCG (2000 UT) tablet, Take 1 tablet by mouth Daily., Disp: , Rfl:     [employer] Lancets Micro Thin 33G misc, 2 (Two) Times a Day., Disp: , Rfl:     DULoxetine (CYMBALTA) 60 MG capsule, Take 2 capsules by mouth Daily., Disp: , Rfl:     Ferrous Sulfate (IRON PO), Take 2 tablets by mouth Daily., Disp: , Rfl:     gabapentin (NEURONTIN) 300 MG capsule, 1 capsule 3 (Three) Times a Day., Disp: , Rfl:     glucose blood (FREESTYLE LITE) test strip, Use for glucose testing daily., Disp: 100 each, Rfl: 11    lamoTRIgine (LaMICtal) 150 MG tablet, Take 1 tablet by mouth 2 (Two) Times a Day., Disp: , Rfl:     Lancets (freestyle) lancets, USE FOR TESTING ONCE A DAY, Disp: 100 each, Rfl: 3    lisinopril (PRINIVIL,ZESTRIL) 20 MG tablet, Take 1 tablet by mouth Daily., Disp: 90 tablet, Rfl: 1    metFORMIN (GLUCOPHAGE) 500 MG tablet, Take 1  "tablet by mouth Daily With Breakfast., Disp: 30 tablet, Rfl: 5    pantoprazole (PROTONIX) 40 MG EC tablet, Take 1 tablet by mouth Daily., Disp: 90 tablet, Rfl: 1    Spravato, 84 MG Dose, Nasal Solution, 6 sprays into the nostril(s) as directed by provider 2 (Two) Times a Week., Disp: , Rfl:     traMADol (ULTRAM) 50 MG tablet, Take 1 tablet by mouth 2 (Two) Times a Day., Disp: , Rfl:     traZODone (DESYREL) 150 MG tablet, Take 1 tablet by mouth Every Night., Disp: , Rfl:        Objective   Vital Signs:   /62 (BP Location: Left arm, Patient Position: Sitting)   Pulse 76   Resp 18   Ht 157.5 cm (62\")   Wt 102 kg (225 lb)   BMI 41.15 kg/m²     Physical Exam  Constitutional:       General: She is awake.   Eyes:      Extraocular Movements: Extraocular movements intact.   Neurological:      Mental Status: She is alert.   Psychiatric:         Speech: Speech normal.      Neurological Exam  Mental Status  Awake and alert. Speech is normal. Language is fluent with no aphasia.    Cranial Nerves  CN III, IV, VI: Extraocular movements intact bilaterally.  CN VII: Full and symmetric facial movement.    Gait  Casual gait is normal including stance, stride, and arm swing.      Result Review :                     Assessment and Plan   47 yo female with headaches. Already on cymbalta, gabapentin, lamictal, tramadol. Has HTN so would not try triptans.  On lisinopril.  No reduction in frequency with Topamax.  Benefit with Nurtec.  Her numbness in hands may be CTS, Tinel's +. Will start with wrist splints.  Emgality helped some but still having multiple migraines per month.  Try to start Qulipta at last visit but unsure why she was not able to get this.    Plan:     1.  Start Qulipta.  2.  Continue Ubrelvy at onset of migraine.  3.  Follow-up 3 months.      Follow Up   No follow-ups on file.  Patient was given instructions and counseling regarding her condition or for health maintenance advice. Please see specific information " pulled into the AVS if appropriate.

## 2024-02-27 ENCOUNTER — TRANSCRIBE ORDERS (OUTPATIENT)
Dept: ADMINISTRATIVE | Facility: HOSPITAL | Age: 49
End: 2024-02-27
Payer: COMMERCIAL

## 2024-02-27 DIAGNOSIS — M50.123 CERVICAL DISC DISORDER AT C6-C7 LEVEL WITH RADICULOPATHY: ICD-10-CM

## 2024-02-27 DIAGNOSIS — M50.321 OTHER CERVICAL DISC DEGENERATION AT C4-C5 LEVEL: ICD-10-CM

## 2024-02-27 DIAGNOSIS — M47.812 SPONDYLOSIS OF CERVICAL REGION WITHOUT MYELOPATHY OR RADICULOPATHY: ICD-10-CM

## 2024-02-27 DIAGNOSIS — M50.122 CERVICAL DISC DISORDER AT C5-C6 LEVEL WITH RADICULOPATHY: Primary | ICD-10-CM

## 2024-02-27 DIAGNOSIS — M50.322 OTHER CERVICAL DISC DEGENERATION AT C5-C6 LEVEL: ICD-10-CM

## 2024-02-27 DIAGNOSIS — M50.323 OTHER CERVICAL DISC DEGENERATION AT C6-C7 LEVEL: ICD-10-CM

## 2024-03-07 ENCOUNTER — TELEPHONE (OUTPATIENT)
Dept: INTERNAL MEDICINE | Facility: CLINIC | Age: 49
End: 2024-03-07

## 2024-03-08 NOTE — PROGRESS NOTES
"Chief Complaint- 6 month follow up     Follow-up (No problems reported)    Subjective        Maribel Whitaker presents to [facility] for evaluation of the above complaint    History of Present Illness  Denies any acute health concerns today.  Needs meds refilled.  Is due for labs.  Has chronic fatigue and also fibromyalgia.  Says she has noticed increased hair loss.  She is seeing pain management.  Has migraines and is seeing neurology.  Feels depression is stable on Cymbalta.  Denies any mental health concerns.    Review of Systems - History obtained from the patient  General ROS: positive for  - fatigue  negative for - chills or fever  Respiratory ROS: no cough, shortness of breath, or wheezing  Cardiovascular ROS: no chest pain or dyspnea on exertion  Gastrointestinal ROS: no abdominal pain, change in bowel habits, or black or bloody stools  Dermatological ROS: positive for dry skin and hair changes    Objective   Vital Signs:  /88 (BP Location: Left arm, Patient Position: Sitting, Cuff Size: Large Adult)   Pulse 79   Temp 97.7 °F (36.5 °C) (Temporal)   Resp 16   Ht 157.5 cm (62\")   Wt 102 kg (224 lb)   SpO2 98%   BMI 40.97 kg/m²   Estimated body mass index is 40.97 kg/m² as calculated from the following:    Height as of this encounter: 157.5 cm (62\").    Weight as of this encounter: 102 kg (224 lb).             Physical Exam  Vitals reviewed.   Constitutional:       General: She is not in acute distress.     Appearance: Normal appearance. She is not ill-appearing.   Cardiovascular:      Rate and Rhythm: Normal rate and regular rhythm.      Pulses: Normal pulses.      Heart sounds: Normal heart sounds. No murmur heard.     No friction rub. No gallop.   Pulmonary:      Effort: Pulmonary effort is normal. No respiratory distress.      Breath sounds: Normal breath sounds. No wheezing.   Musculoskeletal:         General: Normal range of motion.      Cervical back: " Normal range of motion and neck supple.   Skin:     General: Skin is warm and dry.      Capillary Refill: Capillary refill takes less than 2 seconds.   Neurological:      General: No focal deficit present.      Mental Status: She is alert and oriented to person, place, and time.   Psychiatric:         Mood and Affect: Mood normal.         Behavior: Behavior normal.         Thought Content: Thought content normal.         Judgment: Judgment normal.        Result Review :  The following data was reviewed by: KAYLYNN Walker on 03/11/2024:                         Assessment and Plan   Diagnoses and all orders for this visit:    1. Essential hypertension (Primary)  Assessment & Plan:  Hypertension is stable and controlled  Continue current treatment regimen.  Blood pressure will be reassessed in 6 months.  Meets blood pressure goal of less than 140/90 per JNC 8 guidelines.     Orders:  -     lisinopril (PRINIVIL,ZESTRIL) 20 MG tablet; Take 1 tablet by mouth Daily.  Dispense: 90 tablet; Refill: 1    2. Class 3 severe obesity due to excess calories with serious comorbidity and body mass index (BMI) of 40.0 to 44.9 in adult  Assessment & Plan:  Patient's (Body mass index is 40.97 kg/m².) indicates that they are obese (BMI >30) with health conditions that include hypertension . Weight is unchanged. BMI  is above average; BMI management plan is completed. We discussed portion control and increasing exercise.   Is going to follow up with bariatrics.     Orders:  -     metFORMIN (GLUCOPHAGE) 500 MG tablet; Take 1 tablet by mouth Daily With Breakfast.  Dispense: 30 tablet; Refill: 5    3. Gastroesophageal reflux disease, unspecified whether esophagitis present  Assessment & Plan:  Stable. Meds refilled.     Orders:  -     pantoprazole (PROTONIX) 40 MG EC tablet; Take 1 tablet by mouth Daily.  Dispense: 90 tablet; Refill: 1    4. Vitamin D deficiency  Assessment & Plan:  Vitamin D level ordered.     Orders:  -     Vitamin  D 25 Hydroxy; Future    5. Other fatigue  Comments:  Checking additional labs  Orders:  -     Comprehensive Metabolic Panel; Future  -     CBC (No Diff); Future  -     TSH; Future  -     Vitamin B12; Future  -     Folate; Future    6. Screening, lipid  Comments:  LDL goal less than 100  Orders:  -     Lipid Panel; Future    7. Prediabetes  -     Hemoglobin A1c; Future  -     metFORMIN (GLUCOPHAGE) 500 MG tablet; Take 1 tablet by mouth Daily With Breakfast.  Dispense: 30 tablet; Refill: 5  -     glucose monitor monitoring kit; Use 1 each 4 (Four) Times a Day.  Dispense: 1 each; Refill: 0  -     Lancets misc; Use 1 each 2 (Two) Times a Day.  Dispense: 200 each; Refill: 3  -     glucose blood test strip; 1 each by Other route 2 (Two) Times a Day. Use as instructed  Dispense: 200 each; Refill: 3           I spent 30 minutes caring for Maribel on this date of service. This time includes time spent by me in the following activities:preparing for the visit, reviewing tests, obtaining and/or reviewing a separately obtained history, performing a medically appropriate examination and/or evaluation , counseling and educating the patient/family/caregiver, ordering medications, tests, or procedures, and documenting information in the medical record  Follow Up   Return in about 6 months (around 9/11/2024) for Annual physical.  Patient was given instructions and counseling regarding her condition or for health maintenance advice. Please see specific information pulled into the AVS if appropriate.

## 2024-03-11 ENCOUNTER — OFFICE VISIT (OUTPATIENT)
Dept: INTERNAL MEDICINE | Facility: CLINIC | Age: 49
End: 2024-03-11
Payer: COMMERCIAL

## 2024-03-11 ENCOUNTER — LAB (OUTPATIENT)
Dept: LAB | Facility: HOSPITAL | Age: 49
End: 2024-03-11
Payer: COMMERCIAL

## 2024-03-11 VITALS
RESPIRATION RATE: 16 BRPM | DIASTOLIC BLOOD PRESSURE: 88 MMHG | HEIGHT: 62 IN | OXYGEN SATURATION: 98 % | BODY MASS INDEX: 41.22 KG/M2 | TEMPERATURE: 97.7 F | WEIGHT: 224 LBS | HEART RATE: 79 BPM | SYSTOLIC BLOOD PRESSURE: 116 MMHG

## 2024-03-11 DIAGNOSIS — K21.9 GASTROESOPHAGEAL REFLUX DISEASE, UNSPECIFIED WHETHER ESOPHAGITIS PRESENT: ICD-10-CM

## 2024-03-11 DIAGNOSIS — Z13.220 SCREENING, LIPID: ICD-10-CM

## 2024-03-11 DIAGNOSIS — E66.01 CLASS 3 SEVERE OBESITY DUE TO EXCESS CALORIES WITH SERIOUS COMORBIDITY AND BODY MASS INDEX (BMI) OF 40.0 TO 44.9 IN ADULT: ICD-10-CM

## 2024-03-11 DIAGNOSIS — R53.83 OTHER FATIGUE: ICD-10-CM

## 2024-03-11 DIAGNOSIS — E55.9 VITAMIN D DEFICIENCY: ICD-10-CM

## 2024-03-11 DIAGNOSIS — R73.03 PREDIABETES: ICD-10-CM

## 2024-03-11 DIAGNOSIS — I10 ESSENTIAL HYPERTENSION: Primary | ICD-10-CM

## 2024-03-11 LAB
25(OH)D3 SERPL-MCNC: 47.2 NG/ML (ref 30–100)
ALBUMIN SERPL-MCNC: 4.7 G/DL (ref 3.5–5.2)
ALBUMIN/GLOB SERPL: 1.6 G/DL
ALP SERPL-CCNC: 131 U/L (ref 39–117)
ALT SERPL W P-5'-P-CCNC: 15 U/L (ref 1–33)
ANION GAP SERPL CALCULATED.3IONS-SCNC: 10 MMOL/L (ref 5–15)
AST SERPL-CCNC: 18 U/L (ref 1–32)
BILIRUB SERPL-MCNC: 0.2 MG/DL (ref 0–1.2)
BUN SERPL-MCNC: 14 MG/DL (ref 6–20)
BUN/CREAT SERPL: 19.2 (ref 7–25)
CALCIUM SPEC-SCNC: 9.4 MG/DL (ref 8.6–10.5)
CHLORIDE SERPL-SCNC: 104 MMOL/L (ref 98–107)
CHOLEST SERPL-MCNC: 170 MG/DL (ref 0–200)
CO2 SERPL-SCNC: 31 MMOL/L (ref 22–29)
CREAT SERPL-MCNC: 0.73 MG/DL (ref 0.57–1)
DEPRECATED RDW RBC AUTO: 46.5 FL (ref 37–54)
EGFRCR SERPLBLD CKD-EPI 2021: 101.6 ML/MIN/1.73
ERYTHROCYTE [DISTWIDTH] IN BLOOD BY AUTOMATED COUNT: 16.5 % (ref 12.3–15.4)
FOLATE SERPL-MCNC: 9.55 NG/ML (ref 4.78–24.2)
GLOBULIN UR ELPH-MCNC: 3 GM/DL
GLUCOSE SERPL-MCNC: 87 MG/DL (ref 65–99)
HBA1C MFR BLD: 5.6 % (ref 4.8–5.6)
HCT VFR BLD AUTO: 44.3 % (ref 34–46.6)
HDLC SERPL-MCNC: 78 MG/DL (ref 40–60)
HGB BLD-MCNC: 13.5 G/DL (ref 12–15.9)
LDLC SERPL CALC-MCNC: 74 MG/DL (ref 0–100)
LDLC/HDLC SERPL: 0.91 {RATIO}
MCH RBC QN AUTO: 24 PG (ref 26.6–33)
MCHC RBC AUTO-ENTMCNC: 30.5 G/DL (ref 31.5–35.7)
MCV RBC AUTO: 78.8 FL (ref 79–97)
PLATELET # BLD AUTO: 321 10*3/MM3 (ref 140–450)
PMV BLD AUTO: 9.9 FL (ref 6–12)
POTASSIUM SERPL-SCNC: 3.9 MMOL/L (ref 3.5–5.2)
PROT SERPL-MCNC: 7.7 G/DL (ref 6–8.5)
RBC # BLD AUTO: 5.62 10*6/MM3 (ref 3.77–5.28)
SODIUM SERPL-SCNC: 145 MMOL/L (ref 136–145)
TRIGL SERPL-MCNC: 104 MG/DL (ref 0–150)
TSH SERPL DL<=0.05 MIU/L-ACNC: 2.65 UIU/ML (ref 0.27–4.2)
VIT B12 BLD-MCNC: 672 PG/ML (ref 211–946)
VLDLC SERPL-MCNC: 18 MG/DL (ref 5–40)
WBC NRBC COR # BLD AUTO: 8.01 10*3/MM3 (ref 3.4–10.8)

## 2024-03-11 PROCEDURE — 82746 ASSAY OF FOLIC ACID SERUM: CPT

## 2024-03-11 PROCEDURE — 82306 VITAMIN D 25 HYDROXY: CPT

## 2024-03-11 PROCEDURE — 80050 GENERAL HEALTH PANEL: CPT

## 2024-03-11 PROCEDURE — 82607 VITAMIN B-12: CPT

## 2024-03-11 PROCEDURE — 83036 HEMOGLOBIN GLYCOSYLATED A1C: CPT

## 2024-03-11 PROCEDURE — 36415 COLL VENOUS BLD VENIPUNCTURE: CPT

## 2024-03-11 PROCEDURE — 80061 LIPID PANEL: CPT

## 2024-03-11 RX ORDER — LANCETS 30 GAUGE
1 EACH MISCELLANEOUS 2 TIMES DAILY
Qty: 200 EACH | Refills: 3 | Status: SHIPPED | OUTPATIENT
Start: 2024-03-11

## 2024-03-11 RX ORDER — LISINOPRIL 20 MG/1
20 TABLET ORAL DAILY
Qty: 90 TABLET | Refills: 1 | Status: SHIPPED | OUTPATIENT
Start: 2024-03-11

## 2024-03-11 RX ORDER — BLOOD-GLUCOSE METER
1 KIT MISCELLANEOUS 4 TIMES DAILY
Qty: 1 EACH | Refills: 0 | Status: SHIPPED | OUTPATIENT
Start: 2024-03-11

## 2024-03-11 RX ORDER — PANTOPRAZOLE SODIUM 40 MG/1
40 TABLET, DELAYED RELEASE ORAL DAILY
Qty: 90 TABLET | Refills: 1 | Status: SHIPPED | OUTPATIENT
Start: 2024-03-11

## 2024-03-11 NOTE — ASSESSMENT & PLAN NOTE
Hypertension is stable and controlled  Continue current treatment regimen.  Blood pressure will be reassessed in 6 months.  Meets blood pressure goal of less than 140/90 per JNC 8 guidelines.

## 2024-03-11 NOTE — ASSESSMENT & PLAN NOTE
Patient's depression is a single episode that is mild without psychosis. Depression is in partial remission and stable.    Plan:   Continue current medication therapy     Followup in 6 months.

## 2024-03-11 NOTE — ASSESSMENT & PLAN NOTE
Patient's (Body mass index is 40.97 kg/m².) indicates that they are obese (BMI >30) with health conditions that include hypertension . Weight is unchanged. BMI  is above average; BMI management plan is completed. We discussed portion control and increasing exercise.   Is going to follow up with bariatrics.

## 2024-03-11 NOTE — PATIENT INSTRUCTIONS

## 2024-03-13 ENCOUNTER — PROCEDURE VISIT (OUTPATIENT)
Dept: OTOLARYNGOLOGY | Facility: CLINIC | Age: 49
End: 2024-03-13
Payer: COMMERCIAL

## 2024-03-13 ENCOUNTER — OFFICE VISIT (OUTPATIENT)
Dept: OTOLARYNGOLOGY | Facility: CLINIC | Age: 49
End: 2024-03-13
Payer: COMMERCIAL

## 2024-03-13 VITALS
WEIGHT: 222.2 LBS | DIASTOLIC BLOOD PRESSURE: 82 MMHG | BODY MASS INDEX: 40.64 KG/M2 | TEMPERATURE: 97.5 F | HEART RATE: 78 BPM | SYSTOLIC BLOOD PRESSURE: 151 MMHG

## 2024-03-13 DIAGNOSIS — H93.11 TINNITUS OF RIGHT EAR: ICD-10-CM

## 2024-03-13 DIAGNOSIS — R26.89 IMBALANCE: ICD-10-CM

## 2024-03-13 DIAGNOSIS — H90.3 SENSORINEURAL HEARING LOSS, BILATERAL: Primary | ICD-10-CM

## 2024-03-13 DIAGNOSIS — H81.01 MENIERE DISEASE, RIGHT: ICD-10-CM

## 2024-03-13 DIAGNOSIS — R42 VERTIGO: ICD-10-CM
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
## 2024-03-13 NOTE — PROGRESS NOTES
Oj Costello MD  [facility]  [address], [address]  [place] [facility] [address]  Fax [phone]  Phone [phone]      Visit Type: FOLLOW UP   Chief Complaint   Patient presents with    Follow-up     1 year ear follow up; c/o hard of hearing sometimes.            History of Present Illness  The patient is here to follow up the ears.    She has problems understanding what someone is saying clearly. She does not wear hearing aids, but she feels like she needs them. Her last hearing test was today. She is more wobbly because she fell and broke her ankle last year. She fell and broke her shoulder in 01/2024. She feels more imbalanced. She has not done physical therapy for her balance. She is not following a low-salt diet.         History     Last Reviewed by Oj Costello MD on 3/13/2024 at  3:06 PM    Sections Reviewed    Tobacco, Family, Medical, Surgical    Problem list reviewed by Oj Costello MD on 3/13/2024 at  3:06 PM  Medicines reviewed by Oj Costello MD on 3/13/2024 at  3:06 PM  Allergies reviewed by Oj Costello MD on 3/13/2024 at  3:06 PM          Vital Signs:   Temp:  [97.5 °F (36.4 °C)] 97.5 °F (36.4 °C)  Heart Rate:  [78] 78  BP: (151)/(82) 151/82  Physical Exam  Right ear, outer ear looks normal. Ear canal does not have any wax in it. It has normal structure. Eardrum is very clear. No fluid behind the eardrum and no holes in the eardrum. Left ear, outer ear looks pretty normal. Ear canal looks clear. No obstructing wax. Eardrum looks pretty clear. No fluid in the middle ear space. No perforations. No signs of infection. Outer nose looks pretty good. No polyps or drainage. Mouth, tongue, and teeth are pretty normal. No growths on the lining. Throat is clear. Eyes are tracking normally and moving normally in all directions. No nystagmus. Facial sensation is normal. Facial motion is  completely normal on both sides. Finger-to-nose examination is normal.           Result Review       RESULTS REVIEW    Results  Testing  Tympanogram shows type A tympanograms bilaterally. Audiogram shows mild sensorineural hearing loss .           Assessment & Plan    Assessment & Plan  1. Mild sensorineural hearing loss.  I think she has a balance imbalance and dizziness phenomenon, whether it is Meniere's disease or something else. I will refer her to physical therapy. She was advised to protect her ears from loud noises. If it really got worse, she could consider hearing aid amplification.    Follow-up  The patient will follow up in 1 year with a hearing test or sooner if she has any problems.     Orders Placed This Encounter   Procedures    Ambulatory Referral to Physical Therapy Vestibular    Comprehensive Hearing Test           Return in about 1 year (around 3/13/2025) for follow up with audiogram.        Patient or patient representative verbalized consent for the use of Ambient Listening during the visit with  Oj Costello MD for chart documentation. 3/13/2024  15:06 CDT  Oj Costello MD

## 2024-03-13 NOTE — PATIENT INSTRUCTIONS
Indu - [place] Exercises    Exercises for the management of vertigo  These exercises are designed to stimulate or “work” the vestibular system and eventually lessen vertigo during daily activities.  Each exercise is to be done at least twice a day beginning with 5 repetitions each and increasing to 10 repetitions, if you are able.    A.  Head and eye movements while sitting:  Keeping head still, look up and then down.  Keeping head still, look side to side.  Hold a finger out at arm’s length.  Focus on your finger and bring it toward your nose, then back again.  Move your head slowly side to side with your eyes open.  Move your head quickly side to side.  Move your head slowly up and down with your eyes closed.  Move your head quickly up and down.  Repeat numbers 4 through 7 with your eyes closed.    B.  Head and body movements while sitting:  Place an object on the floor in front of you.  Reach down to pick it up, then return to an upright position.  Remember to look down at the object, then look back up when you bring your trunk back up.  Bend forward and pass the object back and forth under your knees.    C.  1.   Go from a sitting to standing position, then return to sitting.  2. Repeat this with eyes closed.  3.   Repeat number 1, but turn a full [place] while standing before sitting down.    Other Activities to improve balance:  Walk up and down stairs carefully with your eyes open, then closed.  Hold onto a handrail for safety, if needed.  While standing, practice making sudden 90 degree turns first with the eyes open, then closed.  While walking, look side to side.  This is best done in a grocery store;  read labels as you walk down the isle.  Practice standing on one foot; first with the eyes open, then closed.  Standing on a soft surface, such as a egg crate mattress, pillow or foam mat:  First walk across the surface to get used to it.  If you have room walk heel to toe with the eyes open, then  closed.  Practice standing on one foot with the eyes open, then closed. LOW SODIUM DIET:  Restrict total daily sodium intake to  2000 mg of sodium a day  Use distilled water to drink and cook if needed  Please look at labelling to determine the sodium content of foods  Use the internet to determine sodium content od unlabelled foods, especially the more common foods you consume  Avoid processed foods  Eat fresh vegetables and meats  (see Marie's instruction sheet)       CONTACT INFORMATION:  The main office phone number is [phone]. For emergencies after hours and on weekends, this number will convert over to our answering service and the on call provider will answer. Please try to keep non emergent phone calls/ questions to office hours 9am-5pm Monday through Friday.     [employer]  As an alternative, you can sign up and use the Epic MyChart system for more direct and quicker access for non emergent questions/ problems.  [employer] allows you to send messages to your doctor, view your test results, renew your prescriptions, schedule appointments, and more. To sign up, go to [email or web address].    If you have questions, you can email ams [email or web address] or call [phone] to talk to our [employer] staff. Remember, [employer] is NOT to be used for urgent needs. For medical emergencies, dial 911.

## 2024-04-24 ENCOUNTER — OFFICE VISIT (OUTPATIENT)
Dept: OBSTETRICS AND GYNECOLOGY | Age: 49
End: 2024-04-24
Payer: COMMERCIAL

## 2024-04-24 VITALS
DIASTOLIC BLOOD PRESSURE: 88 MMHG | RESPIRATION RATE: 18 BRPM | BODY MASS INDEX: 40.67 KG/M2 | WEIGHT: 221 LBS | SYSTOLIC BLOOD PRESSURE: 120 MMHG | HEIGHT: 62 IN

## 2024-04-24 DIAGNOSIS — N91.2 AMENORRHEA: Primary | ICD-10-CM

## 2024-04-29 NOTE — PROGRESS NOTES
Subjective   Maribel Whitaker is a 48 y.o. female  YOB: 1975      Chief Complaint   Patient presents with    Menopause     Patient presents today for menopause symptoms she states she has hot flashes, fatigue and mood disorder.    Patient states she has had two periods in the last two years.        Patient here with c/o menopausal symptoms.         The following portions of the patient's history were reviewed and updated as appropriate: allergies, current medications, past family history, past medical history, past social history, past surgical history, and problem list.    Allergies   Allergen Reactions    Iron GI Intolerance       Past Medical History:   Diagnosis Date    Acid reflux     ADHD (attention deficit hyperactivity disorder)     Allergic     Anxiety     Arthritis     Depression     Diabetes mellitus 2020    Pre diabetic    Fibromyalgia     Fibromyalgia, primary     HL (hearing loss) 2020    Meniere's disease    Hypertension     Low back pain 2012    Migraines     Neck ache        Family History   Problem Relation Age of Onset    Diabetes Mother         Tyoe 2    Obesity Mother     Osteoporosis Mother     Kidney cancer Mother     Anxiety disorder Mother     Heart disease Father     Diabetes Father         Type 2    Obesity Father     Hearing loss Father         Menieres    Breast cancer Maternal Aunt 65    Osteoporosis Maternal Grandmother     Ovarian cancer Neg Hx     Uterine cancer Neg Hx     Colon cancer Neg Hx     Melanoma Neg Hx     Colon polyps Neg Hx     Esophageal cancer Neg Hx        Social History     Socioeconomic History    Marital status:    Tobacco Use    Smoking status: Never     Passive exposure: Never    Smokeless tobacco: Never   Vaping Use    Vaping status: Never Used   Substance and Sexual Activity    Alcohol use: No    Drug use: No    Sexual activity: Yes     Partners: Male     Birth control/protection: OCP         Current Outpatient Medications:      ALPRAZolam (XANAX) 0.5 MG tablet, Take 1 tablet by mouth 3 (Three) Times a Day As Needed., Disp: , Rfl:     Atogepant (Qulipta) 60 MG tablet, Take 1 tablet by mouth Daily., Disp: 30 tablet, Rfl: 5    baclofen (LIORESAL) 10 MG tablet, 1 tablet 3 (Three) Times a Day., Disp: , Rfl:     cetirizine (zyrTEC) 10 MG tablet, Take 1 tablet by mouth Daily., Disp: , Rfl:     Cholecalciferol (Vitamin D) 50 MCG (2000 UT) tablet, Take 1 tablet by mouth Daily., Disp: , Rfl:     [employer] Lancets Micro Thin 33G misc, 2 (Two) Times a Day., Disp: , Rfl:     DULoxetine (CYMBALTA) 60 MG capsule, Take 2 capsules by mouth Daily., Disp: , Rfl:     Ferrous Sulfate (IRON PO), Take 2 tablets by mouth Daily., Disp: , Rfl:     gabapentin (NEURONTIN) 300 MG capsule, 1 capsule 3 (Three) Times a Day., Disp: , Rfl:     glucose blood test strip, 1 each by Other route 2 (Two) Times a Day. Use as instructed, Disp: 200 each, Rfl: 3    glucose monitor monitoring kit, Use 1 each 4 (Four) Times a Day., Disp: 1 each, Rfl: 0    lamoTRIgine (LaMICtal) 150 MG tablet, Take 1 tablet by mouth 2 (Two) Times a Day., Disp: , Rfl:     Lancets (freestyle) lancets, USE FOR TESTING ONCE A DAY, Disp: 100 each, Rfl: 3    Lancets misc, Use 1 each 2 (Two) Times a Day., Disp: 200 each, Rfl: 3    lisinopril (PRINIVIL,ZESTRIL) 20 MG tablet, Take 1 tablet by mouth Daily., Disp: 90 tablet, Rfl: 1    metFORMIN (GLUCOPHAGE) 500 MG tablet, Take 1 tablet by mouth Daily With Breakfast., Disp: 30 tablet, Rfl: 5    pantoprazole (PROTONIX) 40 MG EC tablet, Take 1 tablet by mouth Daily., Disp: 90 tablet, Rfl: 1    Spravato, 84 MG Dose, Nasal Solution, 6 sprays into the nostril(s) as directed by provider 2 (Two) Times a Week., Disp: , Rfl:     traMADol (ULTRAM) 50 MG tablet, Take 1 tablet by mouth 2 (Two) Times a Day., Disp: , Rfl:     traZODone (DESYREL) 150 MG tablet, Take 1 tablet by mouth Every Night., Disp: , Rfl:     No LMP recorded. (Menstrual status: Other).    Sexual History:          Could not be calculated    Past Surgical History:   Procedure Laterality Date     SECTION      CHOLECYSTECTOMY      GASTRIC SLEEVE LAPAROSCOPIC  2019    Dr. Yi       Review of Systems   Constitutional:  Positive for fatigue. Negative for activity change, appetite change, chills, diaphoresis, fever and unexpected weight change.   HENT:  Negative for congestion, dental problem, drooling, ear discharge, ear pain, facial swelling, hearing loss, mouth sores, nosebleeds, postnasal drip, rhinorrhea, sinus pressure, sinus pain, sneezing, sore throat, tinnitus, trouble swallowing and voice change.    Eyes:  Negative for photophobia, pain, discharge, redness, itching and visual disturbance.   Respiratory:  Negative for apnea, cough, choking, chest tightness, shortness of breath, wheezing and stridor.    Cardiovascular:  Negative for chest pain, palpitations and leg swelling.   Gastrointestinal:  Negative for abdominal distention, abdominal pain, anal bleeding, blood in stool, constipation, diarrhea, nausea, rectal pain and vomiting.   Endocrine: Negative for cold intolerance, heat intolerance, polydipsia, polyphagia and polyuria.        Vasomotor symptoms.     Genitourinary:  Negative for decreased urine volume, difficulty urinating, dyspareunia, dysuria, enuresis, flank pain, frequency, genital sores, hematuria, menstrual problem, pelvic pain, urgency, vaginal bleeding, vaginal discharge and vaginal pain.   Musculoskeletal:  Negative for arthralgias, back pain, gait problem, joint swelling, myalgias, neck pain and neck stiffness.   Skin:  Negative for color change, pallor, rash and wound.   Allergic/Immunologic: Negative for environmental allergies, food allergies and immunocompromised state.   Neurological:  Negative for dizziness, tremors, seizures, syncope, facial asymmetry, speech difficulty, weakness, light-headedness, numbness and headaches.   Hematological:  Negative for adenopathy. Does not  "bruise/bleed easily.   Psychiatric/Behavioral:  Negative for agitation, behavioral problems, confusion, decreased concentration, dysphoric mood, hallucinations, self-injury, sleep disturbance and suicidal ideas. The patient is not nervous/anxious and is not hyperactive.        Objective   Physical Exam  Vitals and nursing note reviewed.   Constitutional:       Appearance: She is well-developed.   HENT:      Head: Normocephalic.   Eyes:      Pupils: Pupils are equal, round, and reactive to light.   Cardiovascular:      Rate and Rhythm: Normal rate and regular rhythm.   Pulmonary:      Effort: Pulmonary effort is normal.      Breath sounds: Normal breath sounds.   Abdominal:      Palpations: Abdomen is soft.   Musculoskeletal:         General: Normal range of motion.      Cervical back: Normal range of motion.   Skin:     General: Skin is warm and dry.   Neurological:      Mental Status: She is alert and oriented to person, place, and time.   Psychiatric:         Behavior: Behavior normal.           Vitals:    04/24/24 1109   BP: 120/88   Resp: 18   Weight: 100 kg (221 lb)   Height: 157.5 cm (62\")       Diagnoses and all orders for this visit:    1. Amenorrhea (Primary)  Comments:  Patient reports she has had two periods in the last 2 years.  Reports hot flashes, fatigue, and mood changes.  Hormone pnael done today - f/u pending results.  Orders:  -     Cortisol  -     DHEA-Sulfate  -     Estradiol  -     Follicle Stimulating Hormone  -     Luteinizing Hormone  -     Progesterone  -     Testosterone                        Non-Smoker    MyChart Instructions Given       "

## 2024-05-02 DIAGNOSIS — K59.00 CONSTIPATION, UNSPECIFIED CONSTIPATION TYPE: ICD-10-CM

## 2024-05-02 RX ORDER — DOCUSATE SODIUM 100 MG/1
100 CAPSULE, LIQUID FILLED ORAL 2 TIMES DAILY
Qty: 60 CAPSULE | Refills: 2 | Status: SHIPPED | OUTPATIENT
Start: 2024-05-02

## 2024-07-20 DIAGNOSIS — N92.1 MENORRHAGIA WITH IRREGULAR CYCLE: ICD-10-CM

## 2024-07-20 DIAGNOSIS — Z30.41 ENCOUNTER FOR SURVEILLANCE OF CONTRACEPTIVE PILLS: ICD-10-CM

## 2024-07-22 RX ORDER — ACETAMINOPHEN AND CODEINE PHOSPHATE 120; 12 MG/5ML; MG/5ML
1 SOLUTION ORAL DAILY
Qty: 28 TABLET | Refills: 12 | OUTPATIENT
Start: 2024-07-22

## 2024-08-03 DIAGNOSIS — K59.00 CONSTIPATION, UNSPECIFIED CONSTIPATION TYPE: ICD-10-CM

## 2024-08-05 RX ORDER — DOCUSATE SODIUM 100 MG/1
100 CAPSULE, LIQUID FILLED ORAL 2 TIMES DAILY
Qty: 60 CAPSULE | Refills: 2 | Status: SHIPPED | OUTPATIENT
Start: 2024-08-05

## 2024-09-11 ENCOUNTER — TELEPHONE (OUTPATIENT)
Dept: INTERNAL MEDICINE | Facility: CLINIC | Age: 49
End: 2024-09-11

## 2024-09-19 ENCOUNTER — APPOINTMENT (OUTPATIENT)
Dept: CT IMAGING | Facility: HOSPITAL | Age: 49
End: 2024-09-19
Payer: COMMERCIAL

## 2024-09-19 ENCOUNTER — HOSPITAL ENCOUNTER (EMERGENCY)
Facility: HOSPITAL | Age: 49
Discharge: HOME | End: 2024-09-19
Attending: FAMILY MEDICINE
Payer: COMMERCIAL

## 2024-09-19 VITALS
HEIGHT: 68 IN | OXYGEN SATURATION: 95 % | TEMPERATURE: 98.2 F | WEIGHT: 215 LBS | SYSTOLIC BLOOD PRESSURE: 135 MMHG | RESPIRATION RATE: 20 BRPM | HEART RATE: 78 BPM | DIASTOLIC BLOOD PRESSURE: 74 MMHG | BODY MASS INDEX: 32.58 KG/M2

## 2024-09-19 DIAGNOSIS — S01.81XA FACIAL LACERATION, INITIAL ENCOUNTER: ICD-10-CM

## 2024-09-19 DIAGNOSIS — S13.9XXA NECK SPRAIN, INITIAL ENCOUNTER: ICD-10-CM

## 2024-09-19 DIAGNOSIS — S09.90XA CLOSED HEAD INJURY, INITIAL ENCOUNTER: Primary | ICD-10-CM
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
## 2024-09-19 PROCEDURE — 99284 EMERGENCY DEPT VISIT MOD MDM: CPT

## 2024-09-19 PROCEDURE — 72125 CT NECK SPINE W/O DYE: CPT

## 2024-09-19 PROCEDURE — 70450 CT HEAD/BRAIN W/O DYE: CPT

## 2024-09-19 RX ORDER — LIDOCAINE HYDROCHLORIDE 10 MG/ML
10 INJECTION, SOLUTION INFILTRATION; PERINEURAL ONCE
Status: DISCONTINUED | OUTPATIENT
Start: 2024-09-19 | End: 2024-09-19

## 2024-09-27 ENCOUNTER — OFFICE VISIT (OUTPATIENT)
Dept: INTERNAL MEDICINE | Facility: CLINIC | Age: 49
End: 2024-09-27
Payer: COMMERCIAL

## 2024-09-27 VITALS
RESPIRATION RATE: 16 BRPM | OXYGEN SATURATION: 98 % | HEART RATE: 73 BPM | BODY MASS INDEX: 32.34 KG/M2 | HEIGHT: 68 IN | WEIGHT: 213.4 LBS | SYSTOLIC BLOOD PRESSURE: 130 MMHG | DIASTOLIC BLOOD PRESSURE: 89 MMHG

## 2024-09-27 DIAGNOSIS — E66.01 CLASS 3 SEVERE OBESITY DUE TO EXCESS CALORIES WITH SERIOUS COMORBIDITY AND BODY MASS INDEX (BMI) OF 40.0 TO 44.9 IN ADULT: ICD-10-CM

## 2024-09-27 DIAGNOSIS — I10 ESSENTIAL HYPERTENSION: ICD-10-CM

## 2024-09-27 DIAGNOSIS — R19.5 POSITIVE COLORECTAL CANCER SCREENING USING COLOGUARD TEST: ICD-10-CM

## 2024-09-27 DIAGNOSIS — Z12.11 SCREENING FOR COLORECTAL CANCER: ICD-10-CM

## 2024-09-27 DIAGNOSIS — Z12.12 SCREENING FOR COLORECTAL CANCER: ICD-10-CM

## 2024-09-27 DIAGNOSIS — R73.02 IMPAIRED GLUCOSE TOLERANCE: ICD-10-CM

## 2024-09-27 DIAGNOSIS — Z23 NEED FOR VACCINATION: ICD-10-CM

## 2024-09-27 DIAGNOSIS — M67.441 GANGLION CYST OF FINGER OF RIGHT HAND: ICD-10-CM

## 2024-09-27 DIAGNOSIS — Z12.31 ENCOUNTER FOR SCREENING MAMMOGRAM FOR MALIGNANT NEOPLASM OF BREAST: ICD-10-CM

## 2024-09-27 DIAGNOSIS — Z00.01 ANNUAL VISIT FOR GENERAL ADULT MEDICAL EXAMINATION WITH ABNORMAL FINDINGS: Primary | ICD-10-CM

## 2024-09-27 PROCEDURE — 1160F RVW MEDS BY RX/DR IN RCRD: CPT | Performed by: INTERNAL MEDICINE

## 2024-09-27 PROCEDURE — 90656 IIV3 VACC NO PRSV 0.5 ML IM: CPT | Performed by: INTERNAL MEDICINE

## 2024-09-27 PROCEDURE — 1159F MED LIST DOCD IN RCRD: CPT | Performed by: INTERNAL MEDICINE

## 2024-09-27 PROCEDURE — 3079F DIAST BP 80-89 MM HG: CPT | Performed by: INTERNAL MEDICINE

## 2024-09-27 PROCEDURE — 3075F SYST BP GE 130 - 139MM HG: CPT | Performed by: INTERNAL MEDICINE

## 2024-09-27 PROCEDURE — 99396 PREV VISIT EST AGE 40-64: CPT | Performed by: INTERNAL MEDICINE

## 2024-09-27 PROCEDURE — 90471 IMMUNIZATION ADMIN: CPT | Performed by: INTERNAL MEDICINE

## 2024-09-27 PROCEDURE — 1126F AMNT PAIN NOTED NONE PRSNT: CPT | Performed by: INTERNAL MEDICINE

## 2024-09-27 RX ORDER — LISINOPRIL 20 MG/1
20 TABLET ORAL DAILY
Qty: 90 TABLET | Refills: 1 | Status: SHIPPED | OUTPATIENT
Start: 2024-09-27

## 2024-10-04 ENCOUNTER — PATIENT MESSAGE (OUTPATIENT)
Dept: OBSTETRICS AND GYNECOLOGY | Age: 49
End: 2024-10-04
Payer: COMMERCIAL

## 2024-11-01 DIAGNOSIS — K21.9 GASTROESOPHAGEAL REFLUX DISEASE, UNSPECIFIED WHETHER ESOPHAGITIS PRESENT: ICD-10-CM

## 2024-11-01 RX ORDER — PANTOPRAZOLE SODIUM 40 MG/1
40 TABLET, DELAYED RELEASE ORAL DAILY
Qty: 90 TABLET | Refills: 1 | Status: SHIPPED | OUTPATIENT
Start: 2024-11-01

## 2024-11-14 ENCOUNTER — TELEPHONE (OUTPATIENT)
Dept: OBSTETRICS AND GYNECOLOGY | Age: 49
End: 2024-11-14

## 2024-11-14 NOTE — TELEPHONE ENCOUNTER
Caller: Maribel Whitaker    Relationship:  Self    Best call back number: [phone]    PATIENT CALLED REQUESTING TO CANCEL SAME DAY APPT.    Did the patient call AFTER the start time of their scheduled appointment?  []YES  [x]NO    Was the patient's appointment rescheduled? [x]YES  []NO

## 2024-11-25 ENCOUNTER — TELEPHONE (OUTPATIENT)
Dept: GASTROENTEROLOGY | Facility: CLINIC | Age: 49
End: 2024-11-25
Payer: COMMERCIAL

## 2024-11-27 ENCOUNTER — TELEPHONE (OUTPATIENT)
Dept: GASTROENTEROLOGY | Facility: CLINIC | Age: 49
End: 2024-11-27
Payer: COMMERCIAL

## 2024-12-06 RX ORDER — CHOLECALCIFEROL (VITAMIN D3) 50 MCG
2000 TABLET ORAL DAILY
Qty: 90 TABLET | Refills: 1 | Status: SHIPPED | OUTPATIENT
Start: 2024-12-06

## 2024-12-10 RX ORDER — CHOLECALCIFEROL (VITAMIN D3) 50 MCG
2000 TABLET ORAL DAILY
Qty: 90 TABLET | Refills: 1 | Status: SHIPPED | OUTPATIENT
Start: 2024-12-10

## 2024-12-10 NOTE — TELEPHONE ENCOUNTER
Rx Refill Note  Requested Prescriptions     Pending Prescriptions Disp Refills    Cholecalciferol (Vitamin D) 50 MCG (2000 UT) tablet 90 tablet 1     Sig: Take 1 tablet by mouth Daily.      Last office visit with prescribing clinician: 3/11/2024   Last telemedicine visit with prescribing clinician: Visit date not found   Next office visit with prescribing clinician: 3/28/2025                         Would you like a call back once the refill request has been completed: [] Yes [] No    If the office needs to give you a call back, can they leave a voicemail: [] Yes [] No    Morgan Venegas MA  12/10/24, 10:10 CST

## 2024-12-20 ENCOUNTER — HOSPITAL ENCOUNTER (EMERGENCY)
Facility: HOSPITAL | Age: 49
Discharge: HOME | End: 2024-12-20
Attending: FAMILY MEDICINE
Payer: COMMERCIAL

## 2024-12-20 ENCOUNTER — APPOINTMENT (OUTPATIENT)
Dept: GENERAL RADIOLOGY | Facility: HOSPITAL | Age: 49
End: 2024-12-20
Payer: COMMERCIAL

## 2024-12-20 VITALS
HEIGHT: 62 IN | OXYGEN SATURATION: 100 % | RESPIRATION RATE: 18 BRPM | BODY MASS INDEX: 38.64 KG/M2 | WEIGHT: 210 LBS | SYSTOLIC BLOOD PRESSURE: 137 MMHG | HEART RATE: 72 BPM | TEMPERATURE: 98 F | DIASTOLIC BLOOD PRESSURE: 76 MMHG

## 2024-12-20 DIAGNOSIS — R05.1 ACUTE COUGH: ICD-10-CM

## 2024-12-20 DIAGNOSIS — Z20.822 CLOSE EXPOSURE TO COVID-19 VIRUS: Primary | ICD-10-CM

## 2024-12-20 LAB
FLUAV RNA RESP QL NAA+PROBE: NOT DETECTED
FLUBV RNA RESP QL NAA+PROBE: NOT DETECTED
RSV RNA RESP QL NAA+PROBE: NOT DETECTED
SARS-COV-2 RNA RESP QL NAA+PROBE: NOT DETECTED

## 2024-12-20 PROCEDURE — 71045 X-RAY EXAM CHEST 1 VIEW: CPT

## 2024-12-20 PROCEDURE — 99283 EMERGENCY DEPT VISIT LOW MDM: CPT

## 2024-12-20 PROCEDURE — 87637 SARSCOV2&INF A&B&RSV AMP PRB: CPT | Performed by: FAMILY MEDICINE

## 2024-12-20 NOTE — ED PROVIDER NOTES
HPI:    Patient is a 49-year-old white female whose  has COVID at home so now she has a cough this been going on for 1 day thus come to the emergency room to see if she has COVID.  He states that it burns when she takes a deep breath.  There is no reported fever.  States she has bodyaches and chills.    REVIEW OF SYSTEMS  CONSTITUTIONAL: Positive for chills and exposure to COVID  EYES:  No complaints of discharge   ENT: No complaints of sore throat or ear pain  CARDIOVASCULAR:  No complaints of chest pain, palpitations, or swelling  RESPIRATORY: Positive for nonproductive cough  GI:  No complaints of abdominal pain, nausea, vomiting, or diarrhea  MUSCULOSKELETAL: Positive for body aches SKIN:  No complaints of rash  NEUROLOGIC:  No complaints of headache, focal weakness, or sensory changes  ENDOCRINE:  No complaints of polyuria or polydipsia  LYMPHATIC:  No complaints of swollen glands  GENITOURINARY: No complaints of urinary frequency or hematuria        PAST MEDICAL HISTORY  Past Medical History:   Diagnosis Date    Acid reflux     ADHD (attention deficit hyperactivity disorder)     Allergic     Anxiety     Arthritis     Depression     Diabetes mellitus 2020    Pre diabetic    Fibromyalgia     Fibromyalgia, primary     HL (hearing loss) 2020    Meniere's disease    Hypertension     Low back pain 2012    Migraines     Neck ache        FAMILY HISTORY  Family History   Problem Relation Age of Onset    Diabetes Mother         Tyoe 2    Obesity Mother     Osteoporosis Mother     Kidney cancer Mother     Anxiety disorder Mother     Heart disease Father     Diabetes Father         Type 2    Obesity Father     Hearing loss Father         Menieres    Breast cancer Maternal Aunt 65    Osteoporosis Maternal Grandmother     Ovarian cancer Neg Hx     Uterine cancer Neg Hx     Colon cancer Neg Hx     Melanoma Neg Hx     Colon polyps Neg Hx     Esophageal cancer Neg Hx        SOCIAL HISTORY  Social History      Socioeconomic History    Marital status:    Tobacco Use    Smoking status: Never     Passive exposure: Never    Smokeless tobacco: Never   Vaping Use    Vaping status: Never Used   Substance and Sexual Activity    Alcohol use: No    Drug use: No    Sexual activity: Yes     Partners: Male     Birth control/protection: OCP       IMMUNIZATION HISTORY  Deferred to primary care physician.    SURGICAL HISTORY  Past Surgical History:   Procedure Laterality Date     SECTION      CHOLECYSTECTOMY      GASTRIC SLEEVE LAPAROSCOPIC  2019    Dr. Yi       CURRENT MEDICATIONS  No current facility-administered medications for this encounter.    Current Outpatient Medications:     ALPRAZolam (XANAX) 0.5 MG tablet, Take 1 tablet by mouth 3 (Three) Times a Day As Needed., Disp: , Rfl:     baclofen (LIORESAL) 10 MG tablet, 1 tablet 3 (Three) Times a Day., Disp: , Rfl:     baclofen (LIORESAL) 10 MG tablet, Take 1 tablet by mouth 3 (Three) Times a Day As Needed., Disp: 90 tablet, Rfl: 2    [START ON 2024] baclofen (LIORESAL) 10 MG tablet, Take 1 tablet by mouth 3 (Three) Times a Day As Needed., Disp: 90 tablet, Rfl: 1    cetirizine (zyrTEC) 10 MG tablet, Take 1 tablet by mouth Daily., Disp: , Rfl:     Cholecalciferol (Vitamin D) 50 MCG (2000 UT) tablet, Take 1 tablet by mouth Daily., Disp: 90 tablet, Rfl: 1    docusate sodium (COLACE) 100 MG capsule, Take 1 capsule by mouth 2 (Two) Times a Day., Disp: 60 capsule, Rfl: 2    DULoxetine (CYMBALTA) 60 MG capsule, Take 2 capsules by mouth Daily., Disp: , Rfl:     DULoxetine (Cymbalta) 60 MG capsule, Take 2 capsules by mouth Daily., Disp: 60 capsule, Rfl: 0    Ferrous Sulfate (IRON PO), Take 2 tablets by mouth Daily., Disp: , Rfl:     gabapentin (NEURONTIN) 300 MG capsule, 1 capsule 3 (Three) Times a Day., Disp: , Rfl:     gabapentin (NEURONTIN) 300 MG capsule, Take 1 capsule by mouth Every 6 (Six) Hours., Disp: 120 capsule, Rfl: 1    glucose blood test strip,  "Use as instructed twice daily, Disp: 200 each, Rfl: 3    Lancets (OneTouch Delica Plus [id number]) misc, Use as instructed twice daily, Disp: 200 each, Rfl: 3    lamoTRIgine (LaMICtal) 150 MG tablet, Take 1 tablet by mouth 2 (Two) Times a Day., Disp: , Rfl:     lamoTRIgine (LaMICtal) 150 MG tablet, Take 1 tablet by mouth 2 (Two) Times a Day., Disp: 60 tablet, Rfl: 0    Lancets misc, Use 1 each 2 (Two) Times a Day., Disp: 200 each, Rfl: 3    lisinopril (PRINIVIL,ZESTRIL) 20 MG tablet, Take 1 tablet by mouth Daily., Disp: 90 tablet, Rfl: 1    metFORMIN (GLUCOPHAGE) 500 MG tablet, Take 1 tablet by mouth Daily With Breakfast., Disp: 30 tablet, Rfl: 5    metFORMIN (GLUCOPHAGE) 500 MG tablet, Take 1 tablet by mouth Daily With Breakfast., Disp: 30 tablet, Rfl: 5    naloxone (NARCAN) 4 MG/0.1ML nasal spray, Administer 1 spray into the nostril(s) as directed by provider As Needed. (Patient not taking: Reported on 9/27/2024), Disp: , Rfl:     pantoprazole (PROTONIX) 40 MG EC tablet, Take 1 tablet by mouth Daily., Disp: 90 tablet, Rfl: 1    Spravato, 84 MG Dose, Nasal Solution, Administer 6 sprays into the nostril(s) as directed by provider 2 (Two) Times a Week., Disp: , Rfl:     traMADol (ULTRAM) 50 MG tablet, Take 1 tablet by mouth 2 (Two) Times a Day., Disp: , Rfl:     traZODone (DESYREL) 150 MG tablet, Take 1 tablet by mouth Every Night., Disp: , Rfl:     traZODone (DESYREL) 150 MG tablet, Take 1/3 to 1 tablet by mouth every night at bedtime., Disp: 30 tablet, Rfl: 3    traZODone (DESYREL) 150 MG tablet, Take 1 tablet by mouth every night at bedtime., Disp: 30 tablet, Rfl: 0    ALLERGIES  Allergies   Allergen Reactions    Iron GI Intolerance         Respiratory Exam    VITAL SIGNS:   /76 (BP Location: Right arm, Patient Position: Sitting)   Pulse 72   Temp 98 °F (36.7 °C) (Oral)   Resp 18   Ht 157.5 cm (62\")   Wt 95.3 kg (210 lb)   SpO2 100%   BMI 38.41 kg/m²     Constitutional: Patient is alert and in no " distress.  Patient with mild breathing discomfort.    ENT: There is a normal pharynx with no acute erythema or exudate and oral mucosa is moist.  Nose is clear with no drainage.  Tympanic membranes intact and non-erythemic    Cardiovascular: S1-S2 regular rate and rhythm.  No murmur, rubs or gallops.    Respiratory: Clear to auscultation bilaterally no wheezing or rhonchi    Abdomen: Soft, nontender.  Bowel sounds are normal in all 4 quadrants.  There is no rebound or guarding noted.  There is no abdominal distention or hepatosplenomegaly..    Genitourinary: Patient is voiding appropriately.    Integument: No acute lesions noted.  Color appears to be normal.    Loa Coma Scale: Total score 15    Neurological: Patient is alert and oriented x4 and no acute findings noted.  Speech is fluent and cognition is normal.  No evidence of acute CVA.  Cranial nerves II through XII intact.  Patient with normal motor function as well as reflexes and sensation.    Psychiatric: Normal affect and mood      RADIOLOGY/PROCEDURES    XR Chest 1 View   Final Result   1. No acute disease.                       This report was signed and finalized on 12/20/2024 6:26 AM by Dr. Beka Jiménez MD.                FUTURE APPOINTMENTS     Future Appointments   Date Time Provider Department Center   12/20/2024 10:30 AM Reyna Hurtado APRN [facility]   3/13/2025  1:00 PM Ruchi Plaza AUD [facility]   3/13/2025  1:30 PM Chiquis Byrd APRN [facility]   3/28/2025  2:30 PM Alka De La Cruz APRN [facility] P[facility] PAD          COURSE & MEDICAL DECISION MAKING     Patient's partial differential diagnosis can include:    COVID-19, other viruses, viral pneumonia, pneumonia, pneumonitis, bronchitis, bronchiolitis, COPD exacerbation, congestive heart failure, asthma exacerbation, pulmonary embolism, pneumothorax, pleural effusion, pulmonary edema, empyema, and others      Chest x-ray and COVID-19 test negative.  I do believe  this is because of the patient's symptoms only been present 1 day however her  is positive as I explained to her that her symptoms state basically states that she has COVID but she may not transition into a positive test for 3 to 4 days.    Patient's level of risk: Moderate        CRITICAL CARE    CRITICAL CARE: No    CRITICAL CARE TIME: None    Also Old charts were reviewed per [facility] EMR.  Pertinent details are summarized above.  All laboratory, radiologic, and EKG studies that were performed in the Emergency Department were a necessary part of the evaluation needed to exclude unstable or emergent medical conditions:     Patient was hemodynamically and neurologically stable in the ED.   Pertinent studies were reviewed as above.     Recent Results (from the past 24 hours)   COVID-19, FLU A/B, RSV PCR 1 HR TAT - Swab, Nasopharynx    Collection Time: 12/20/24  5:33 AM    Specimen: Nasopharynx; Swab   Result Value Ref Range    COVID19 Not Detected Not Detected - Ref. Range    Influenza A PCR Not Detected Not Detected    Influenza B PCR Not Detected Not Detected    RSV, PCR Not Detected Not Detected       The patient received:  Medications - No data to display         ED Disposition       ED Disposition   Discharge    Condition   Stable    Comment   --                   Dragon disclaimer:  Part of this note may be an electronic transcription/translation of spoken language to printed text using the Dragon Dictation System.     I have reviewed the patient’s prescription history via a prescription monitoring program.  This information is consistent with my knowledge of the patient’s controlled substance use history.    FINAL IMPRESSION   Diagnosis Plan   1. Close exposure to COVID-19 virus        2. Acute cough              MD Sim Green Jr, Thomas Mark Jr., MD  12/20/24 [id number]

## 2025-01-16 DIAGNOSIS — E66.813 CLASS 3 SEVERE OBESITY DUE TO EXCESS CALORIES WITH SERIOUS COMORBIDITY AND BODY MASS INDEX (BMI) OF 40.0 TO 44.9 IN ADULT: ICD-10-CM

## 2025-01-16 DIAGNOSIS — E66.01 CLASS 3 SEVERE OBESITY DUE TO EXCESS CALORIES WITH SERIOUS COMORBIDITY AND BODY MASS INDEX (BMI) OF 40.0 TO 44.9 IN ADULT: ICD-10-CM

## 2025-01-16 DIAGNOSIS — R73.02 IMPAIRED GLUCOSE TOLERANCE: ICD-10-CM

## 2025-02-25 ENCOUNTER — TELEPHONE (OUTPATIENT)
Dept: INTERNAL MEDICINE | Facility: CLINIC | Age: 50
End: 2025-02-25
Payer: COMMERCIAL

## 2025-03-24 ENCOUNTER — OFFICE VISIT (OUTPATIENT)
Dept: INTERNAL MEDICINE | Facility: CLINIC | Age: 50
End: 2025-03-24
Payer: COMMERCIAL

## 2025-03-24 VITALS
RESPIRATION RATE: 16 BRPM | OXYGEN SATURATION: 98 % | BODY MASS INDEX: 38.16 KG/M2 | HEIGHT: 62 IN | WEIGHT: 207.4 LBS | DIASTOLIC BLOOD PRESSURE: 82 MMHG | SYSTOLIC BLOOD PRESSURE: 127 MMHG | HEART RATE: 80 BPM

## 2025-03-24 DIAGNOSIS — I10 ESSENTIAL HYPERTENSION: ICD-10-CM

## 2025-03-24 DIAGNOSIS — R42 VERTIGO: ICD-10-CM

## 2025-03-24 DIAGNOSIS — M67.441 GANGLION CYST OF FINGER OF RIGHT HAND: Primary | ICD-10-CM

## 2025-03-24 DIAGNOSIS — Z12.11 SCREENING FOR COLORECTAL CANCER: ICD-10-CM

## 2025-03-24 DIAGNOSIS — Z12.12 SCREENING FOR COLORECTAL CANCER: ICD-10-CM

## 2025-03-24 DIAGNOSIS — R26.89 IMBALANCE: ICD-10-CM

## 2025-03-24 PROCEDURE — 99213 OFFICE O/P EST LOW 20 MIN: CPT | Performed by: INTERNAL MEDICINE

## 2025-03-24 NOTE — PROGRESS NOTES
CC: ganglion cyst    History:  Maribel Whitaker is a 49 y.o. female   She notes a ganglion cyst of her right middle finger has been inflamed after suffering a small cut a few weeks ago.  It has been red and painful, but has improved overall.  However, given the pain that she is having, she would like to see a specialist regarding how to resolve this issue.      ROS:  Review of Systems   Constitutional:  Negative for chills and fever.   Respiratory:  Negative for cough and shortness of breath.    Cardiovascular:  Negative for chest pain and palpitations.   Musculoskeletal:  Positive for arthralgias.        reports that she has never smoked. She has never been exposed to tobacco smoke. She has never used smokeless tobacco. She reports that she does not drink alcohol and does not use drugs.      Current Outpatient Medications:     baclofen (LIORESAL) 10 MG tablet, Take 1 tablet by mouth 3 (Three) Times a Day As Needed., Disp: 90 tablet, Rfl: 1    cetirizine (zyrTEC) 10 MG tablet, Take 1 tablet by mouth Daily., Disp: , Rfl:     Cholecalciferol (Vitamin D) 50 MCG (2000 UT) tablet, Take 1 tablet by mouth Daily., Disp: 90 tablet, Rfl: 1    DULoxetine (Cymbalta) 60 MG capsule, Take 2 capsules by mouth Daily., Disp: 60 capsule, Rfl: 3    Ferrous Sulfate (IRON PO), Take 2 tablets by mouth Daily., Disp: , Rfl:     gabapentin (NEURONTIN) 300 MG capsule, Take 1 capsule by mouth Every 6 (Six) Hours., Disp: 120 capsule, Rfl: 1    glucose blood test strip, Use as instructed twice daily, Disp: 200 each, Rfl: 3    lamoTRIgine (LaMICtal) 150 MG tablet, Take 1 tablet by mouth 2 (Two) Times a Day., Disp: 60 tablet, Rfl: 3    Lancets (OneTouch Delica Plus [id number]) misc, Use as instructed twice daily, Disp: 200 each, Rfl: 3    lisinopril (PRINIVIL,ZESTRIL) 20 MG tablet, Take 1 tablet by mouth Daily., Disp: 90 tablet, Rfl: 1    metFORMIN (GLUCOPHAGE) 500 MG tablet, Take 1 tablet by mouth Daily With Breakfast., Disp: 90 tablet, Rfl:  "1    pantoprazole (PROTONIX) 40 MG EC tablet, Take 1 tablet by mouth Daily., Disp: 90 tablet, Rfl: 1    traMADol (ULTRAM) 50 MG tablet, Take 1 tablet by mouth Every 12 (Twelve) Hours., Disp: 60 tablet, Rfl: 1    traZODone (DESYREL) 150 MG tablet, Take 1/3 -1 tablet by mouth every night at bedtime as directed, Disp: 30 tablet, Rfl: 3    OBJECTIVE:  /82 (BP Location: Left arm, Patient Position: Sitting, Cuff Size: Adult)   Pulse 80   Resp 16   Ht 157.5 cm (62\")   Wt 94.1 kg (207 lb 6.4 oz)   LMP  (LMP Unknown)   SpO2 98%   BMI 37.93 kg/m²    Physical Exam  Constitutional:       General: She is not in acute distress.  Pulmonary:      Effort: Pulmonary effort is normal. No respiratory distress.   Musculoskeletal:      Comments: Right 3rd DIP erythematous with swelling and tenderness.    Neurological:      Mental Status: She is alert and oriented to person, place, and time.         Assessment/Plan     Diagnoses and all orders for this visit:    1. Ganglion cyst of finger of right hand (Primary)  -     XR finger 2+ vw right; Future  -     Ambulatory Referral to Orthopedic Surgery  XR to rule out any erosive change, foreign body, or evidence of osteomyelitis.  Referral to Dr. Reece for speciality evaluation.     2. Screening for colorectal cancer  -     Cologuard - Stool, Per Rectum; Future    3. Imbalance  4. Vertigo  -     Ambulatory Referral to Physical Therapy for Evaluation & Treatment  Reviewed Dr. Costello's notes from 3/2024. Referral to vestibular therapy was never completed. We will make this referral.     5. Essential hypertension  Well controlled, BP goal for age is <140/90 per JNC 8 guidelines, and continue current medications    An After Visit Summary was printed and given to the patient at discharge.  Return in about 6 months (around 9/28/2025) for Annual physical.      Miguel Mckenzie D.O. 3/24/2025   Electronically signed.  "

## 2025-03-31 ENCOUNTER — TELEPHONE (OUTPATIENT)
Dept: INTERNAL MEDICINE | Facility: CLINIC | Age: 50
End: 2025-03-31
Payer: COMMERCIAL

## 2025-04-08 ENCOUNTER — PATIENT MESSAGE (OUTPATIENT)
Dept: INTERNAL MEDICINE | Facility: CLINIC | Age: 50
End: 2025-04-08
Payer: COMMERCIAL

## 2025-05-02 NOTE — TELEPHONE ENCOUNTER
Rx Refill Note  Requested Prescriptions     Pending Prescriptions Disp Refills    metFORMIN (GLUCOPHAGE) 500 MG tablet 90 tablet 1     Sig: Take 1 tablet by mouth Daily With Breakfast.      Last office visit with prescribing clinician: 3/11/2024   Last telemedicine visit with prescribing clinician: Visit date not found   Next office visit with prescribing clinician: Visit date not found                         Would you like a call back once the refill request has been completed: [] Yes [] No    If the office needs to give you a call back, can they leave a voicemail: [] Yes [] No    Morgan Venegas MA  05/02/25, 14:40 CDT

## 2025-05-04 DIAGNOSIS — K21.9 GASTROESOPHAGEAL REFLUX DISEASE, UNSPECIFIED WHETHER ESOPHAGITIS PRESENT: ICD-10-CM

## 2025-05-04 DIAGNOSIS — I10 ESSENTIAL HYPERTENSION: ICD-10-CM

## 2025-05-05 RX ORDER — LISINOPRIL 20 MG/1
20 TABLET ORAL DAILY
Qty: 90 TABLET | Refills: 1 | Status: SHIPPED | OUTPATIENT
Start: 2025-05-05

## 2025-05-05 RX ORDER — PANTOPRAZOLE SODIUM 40 MG/1
40 TABLET, DELAYED RELEASE ORAL DAILY
Qty: 90 TABLET | Refills: 1 | Status: SHIPPED | OUTPATIENT
Start: 2025-05-05

## 2025-05-05 NOTE — TELEPHONE ENCOUNTER
Rx Refill Note  Requested Prescriptions     Pending Prescriptions Disp Refills    pantoprazole (PROTONIX) 40 MG EC tablet 90 tablet 1     Sig: Take 1 tablet by mouth Daily.      Last office visit with prescribing clinician: 3/11/2024   Last telemedicine visit with prescribing clinician: Visit date not found   Next office visit with prescribing clinician: 09/29/2025                         Would you like a call back once the refill request has been completed: [] Yes [] No    If the office needs to give you a call back, can they leave a voicemail: [] Yes [] No    ABDELRAHMAN Curran  05/05/25, 10:42 CDT    Medication last filled 11/01/24, qty 90, 1 refill.

## 2025-05-05 NOTE — TELEPHONE ENCOUNTER
Rx Refill Note  Requested Prescriptions     Pending Prescriptions Disp Refills    lisinopril (PRINIVIL,ZESTRIL) 20 MG tablet 90 tablet 1     Sig: Take 1 tablet by mouth Daily.      Last office visit with prescribing clinician: 3/24/2025   Last telemedicine visit with prescribing clinician: Visit date not found   Next office visit with prescribing clinician: 9/29/2025                         Would you like a call back once the refill request has been completed: [] Yes [] No    If the office needs to give you a call back, can they leave a voicemail: [] Yes [] No    ABDELRAHMAN Curran  05/05/25, 10:57 CDT    Medication last filled 09/27/24, qty 90, 1 refill.

## 2025-05-16 ENCOUNTER — APPOINTMENT (OUTPATIENT)
Dept: CT IMAGING | Facility: HOSPITAL | Age: 50
DRG: 682 | End: 2025-05-16
Payer: COMMERCIAL

## 2025-05-16 ENCOUNTER — APPOINTMENT (OUTPATIENT)
Dept: GENERAL RADIOLOGY | Facility: HOSPITAL | Age: 50
DRG: 682 | End: 2025-05-16
Payer: COMMERCIAL

## 2025-05-16 ENCOUNTER — HOSPITAL ENCOUNTER (INPATIENT)
Facility: HOSPITAL | Age: 50
LOS: 2 days | Discharge: HOME | DRG: 682 | End: 2025-05-18
Attending: FAMILY MEDICINE | Admitting: INTERNAL MEDICINE
Payer: COMMERCIAL

## 2025-05-16 DIAGNOSIS — R42 DIZZINESS: ICD-10-CM

## 2025-05-16 DIAGNOSIS — J01.80 OTHER ACUTE SINUSITIS, RECURRENCE NOT SPECIFIED: ICD-10-CM

## 2025-05-16 DIAGNOSIS — N17.9 ACUTE RENAL FAILURE, UNSPECIFIED ACUTE RENAL FAILURE TYPE: Primary | ICD-10-CM

## 2025-05-16 DIAGNOSIS — N39.0 URINARY TRACT INFECTION WITHOUT HEMATURIA, SITE UNSPECIFIED: ICD-10-CM

## 2025-05-16 PROBLEM — G93.41 METABOLIC ENCEPHALOPATHY: Status: ACTIVE | Noted: 2025-05-16

## 2025-05-16 PROBLEM — J01.90 ACUTE SINUSITIS: Status: ACTIVE | Noted: 2025-05-16

## 2025-05-16 PROBLEM — R65.20 SEPSIS WITH ACUTE ORGAN DYSFUNCTION: Status: ACTIVE | Noted: 2025-05-16

## 2025-05-16 PROBLEM — H53.8 BLURRED VISION, BILATERAL: Status: ACTIVE | Noted: 2025-05-16

## 2025-05-16 PROBLEM — N30.01 ACUTE CYSTITIS WITH HEMATURIA: Status: ACTIVE | Noted: 2025-05-16

## 2025-05-16 PROBLEM — R40.0 INTERMITTENT DROWSINESS: Status: ACTIVE | Noted: 2025-05-16

## 2025-05-16 PROBLEM — A41.9 SEPSIS WITH ACUTE ORGAN DYSFUNCTION: Status: ACTIVE | Noted: 2025-05-16

## 2025-05-16 PROBLEM — N30.00 ACUTE CYSTITIS WITHOUT HEMATURIA: Status: ACTIVE | Noted: 2025-05-16

## 2025-05-16 PROBLEM — R47.9 SPEECH DISTURBANCE: Status: ACTIVE | Noted: 2025-05-16

## 2025-05-16 LAB
ACANTHOCYTES BLD QL SMEAR: ABNORMAL
ALBUMIN SERPL-MCNC: 3.9 G/DL (ref 3.5–5.2)
ALBUMIN/GLOB SERPL: 1.1 G/DL
ALP SERPL-CCNC: 112 U/L (ref 39–117)
ALT SERPL W P-5'-P-CCNC: 14 U/L (ref 1–33)
AMORPH URATE CRY URNS QL MICRO: ABNORMAL /HPF
AMPHET+METHAMPHET UR QL: NEGATIVE
AMPHETAMINES UR QL: NEGATIVE
ANION GAP SERPL CALCULATED.3IONS-SCNC: 17 MMOL/L (ref 5–15)
ANISOCYTOSIS BLD QL: ABNORMAL
AST SERPL-CCNC: 19 U/L (ref 1–32)
BACTERIA UR QL AUTO: ABNORMAL /HPF
BARBITURATES UR QL SCN: NEGATIVE
BASOPHILS # BLD AUTO: 0.1 10*3/MM3 (ref 0–0.2)
BASOPHILS # BLD MANUAL: 0 10*3/MM3 (ref 0–0.2)
BASOPHILS NFR BLD AUTO: 0.7 % (ref 0–1.5)
BASOPHILS NFR BLD MANUAL: 0 % (ref 0–1.5)
BENZODIAZ UR QL SCN: NEGATIVE
BILIRUB SERPL-MCNC: 0.4 MG/DL (ref 0–1.2)
BILIRUB UR QL STRIP: ABNORMAL
BUN SERPL-MCNC: 42 MG/DL (ref 6–20)
BUN/CREAT SERPL: 11 (ref 7–25)
BUPRENORPHINE SERPL-MCNC: NEGATIVE NG/ML
CALCIUM SPEC-SCNC: 9.3 MG/DL (ref 8.6–10.5)
CANNABINOIDS SERPL QL: NEGATIVE
CHLORIDE SERPL-SCNC: 99 MMOL/L (ref 98–107)
CLARITY UR: ABNORMAL
CLUMPED PLATELETS: PRESENT
CO2 SERPL-SCNC: 23 MMOL/L (ref 22–29)
COCAINE UR QL: NEGATIVE
COLOR UR: ABNORMAL
CREAT SERPL-MCNC: 3.83 MG/DL (ref 0.57–1)
D-LACTATE SERPL-SCNC: 0.8 MMOL/L (ref 0.5–2)
DEPRECATED RDW RBC AUTO: 45.6 FL (ref 37–54)
EGFRCR SERPLBLD CKD-EPI 2021: 13.8 ML/MIN/1.73
EOSINOPHIL # BLD AUTO: 0.1 10*3/MM3 (ref 0–0.4)
EOSINOPHIL # BLD MANUAL: 0.05 10*3/MM3 (ref 0–0.4)
EOSINOPHIL NFR BLD AUTO: 0.7 % (ref 0.3–6.2)
EOSINOPHIL NFR BLD MANUAL: 0.4 % (ref 0.3–6.2)
ERYTHROCYTE [DISTWIDTH] IN BLOOD BY AUTOMATED COUNT: 17.2 % (ref 12.3–15.4)
FENTANYL UR-MCNC: NEGATIVE NG/ML
FLUAV RNA RESP QL NAA+PROBE: NOT DETECTED
FLUBV RNA RESP QL NAA+PROBE: NOT DETECTED
GIANT PLATELETS: ABNORMAL
GLOBULIN UR ELPH-MCNC: 3.7 GM/DL
GLUCOSE BLDC GLUCOMTR-MCNC: 102 MG/DL (ref 70–130)
GLUCOSE BLDC GLUCOMTR-MCNC: 90 MG/DL (ref 70–130)
GLUCOSE SERPL-MCNC: 110 MG/DL (ref 65–99)
GLUCOSE UR STRIP-MCNC: NEGATIVE MG/DL
GRAN CASTS URNS QL MICRO: ABNORMAL /LPF
HBA1C MFR BLD: 5.8 % (ref 4.8–5.6)
HCT VFR BLD AUTO: 34.8 % (ref 34–46.6)
HGB BLD-MCNC: 11 G/DL (ref 12–15.9)
HGB UR QL STRIP.AUTO: NEGATIVE
HOLD SPECIMEN: NORMAL
HOLD SPECIMEN: NORMAL
HYALINE CASTS UR QL AUTO: ABNORMAL /LPF
HYPOCHROMIA BLD QL: ABNORMAL
IMM GRANULOCYTES # BLD AUTO: 0.21 10*3/MM3 (ref 0–0.05)
IMM GRANULOCYTES NFR BLD AUTO: 1.5 % (ref 0–0.5)
KETONES UR QL STRIP: ABNORMAL
LEUKOCYTE ESTERASE UR QL STRIP.AUTO: ABNORMAL
LYMPHOCYTES # BLD AUTO: 1.26 10*3/MM3 (ref 0.7–3.1)
LYMPHOCYTES # BLD MANUAL: 1.05 10*3/MM3 (ref 0.7–3.1)
LYMPHOCYTES NFR BLD AUTO: 9.2 % (ref 19.6–45.3)
LYMPHOCYTES NFR BLD MANUAL: 8.1 % (ref 5–12)
MAGNESIUM SERPL-MCNC: 2.3 MG/DL (ref 1.6–2.6)
MCH RBC QN AUTO: 23.2 PG (ref 26.6–33)
MCHC RBC AUTO-ENTMCNC: 31.6 G/DL (ref 31.5–35.7)
MCV RBC AUTO: 73.3 FL (ref 79–97)
METAMYELOCYTES NFR BLD MANUAL: 0.4 % (ref 0–0)
METHADONE UR QL SCN: NEGATIVE
MICROCYTES BLD QL: ABNORMAL
MONOCYTES # BLD AUTO: 1.29 10*3/MM3 (ref 0.1–0.9)
MONOCYTES # BLD: 1.11 10*3/MM3 (ref 0.1–0.9)
MONOCYTES NFR BLD AUTO: 9.4 % (ref 5–12)
NEUTROPHILS # BLD AUTO: 11.43 10*3/MM3 (ref 1.7–7)
NEUTROPHILS NFR BLD AUTO: 10.74 10*3/MM3 (ref 1.7–7)
NEUTROPHILS NFR BLD AUTO: 78.5 % (ref 42.7–76)
NEUTROPHILS NFR BLD MANUAL: 71.3 % (ref 42.7–76)
NEUTS BAND NFR BLD MANUAL: 12.1 % (ref 0–5)
NITRITE UR QL STRIP: NEGATIVE
OPIATES UR QL: NEGATIVE
OXYCODONE UR QL SCN: NEGATIVE
PCP UR QL SCN: NEGATIVE
PH UR STRIP.AUTO: <=5 [PH] (ref 5–8)
PLATELET # BLD AUTO: 424 10*3/MM3 (ref 140–450)
PMV BLD AUTO: 10.1 FL (ref 6–12)
POIKILOCYTOSIS BLD QL SMEAR: ABNORMAL
POLYCHROMASIA BLD QL SMEAR: ABNORMAL
POTASSIUM SERPL-SCNC: 4.2 MMOL/L (ref 3.5–5.2)
PROCALCITONIN SERPL-MCNC: 0.25 NG/ML (ref 0–0.25)
PROT SERPL-MCNC: 7.6 G/DL (ref 6–8.5)
PROT UR QL STRIP: ABNORMAL
RBC # BLD AUTO: 4.75 10*6/MM3 (ref 3.77–5.28)
RBC # UR STRIP: ABNORMAL /HPF
REF LAB TEST METHOD: ABNORMAL
S PYO AG THROAT QL: NEGATIVE
SARS-COV-2 RNA RESP QL NAA+PROBE: NOT DETECTED
SODIUM SERPL-SCNC: 139 MMOL/L (ref 136–145)
SP GR UR STRIP: 1.02 (ref 1–1.03)
SQUAMOUS #/AREA URNS HPF: ABNORMAL /HPF
TRICYCLICS UR QL SCN: NEGATIVE
UROBILINOGEN UR QL STRIP: ABNORMAL
VARIANT LYMPHS NFR BLD MANUAL: 3.2 % (ref 0–5)
VARIANT LYMPHS NFR BLD MANUAL: 4.5 % (ref 19.6–45.3)
WBC # UR STRIP: ABNORMAL /HPF
WBC MORPH BLD: NORMAL
WBC NRBC COR # BLD AUTO: 13.7 10*3/MM3 (ref 3.4–10.8)
WHOLE BLOOD HOLD COAG: NORMAL
WHOLE BLOOD HOLD SPECIMEN: NORMAL

## 2025-05-16 PROCEDURE — 87154 CUL TYP ID BLD PTHGN 6+ TRGT: CPT | Performed by: FAMILY MEDICINE

## 2025-05-16 PROCEDURE — 80307 DRUG TEST PRSMV CHEM ANLYZR: CPT | Performed by: FAMILY MEDICINE

## 2025-05-16 PROCEDURE — 85007 BL SMEAR W/DIFF WBC COUNT: CPT | Performed by: FAMILY MEDICINE

## 2025-05-16 PROCEDURE — 83036 HEMOGLOBIN GLYCOSYLATED A1C: CPT | Performed by: NURSE PRACTITIONER

## 2025-05-16 PROCEDURE — 81001 URINALYSIS AUTO W/SCOPE: CPT | Performed by: FAMILY MEDICINE

## 2025-05-16 PROCEDURE — 87081 CULTURE SCREEN ONLY: CPT | Performed by: FAMILY MEDICINE

## 2025-05-16 PROCEDURE — 87147 CULTURE TYPE IMMUNOLOGIC: CPT | Performed by: FAMILY MEDICINE

## 2025-05-16 PROCEDURE — 70450 CT HEAD/BRAIN W/O DYE: CPT

## 2025-05-16 PROCEDURE — 83605 ASSAY OF LACTIC ACID: CPT | Performed by: FAMILY MEDICINE

## 2025-05-16 PROCEDURE — 85025 COMPLETE CBC W/AUTO DIFF WBC: CPT | Performed by: FAMILY MEDICINE

## 2025-05-16 PROCEDURE — 80053 COMPREHEN METABOLIC PANEL: CPT | Performed by: FAMILY MEDICINE

## 2025-05-16 PROCEDURE — 71045 X-RAY EXAM CHEST 1 VIEW: CPT

## 2025-05-16 PROCEDURE — 25010000002 CEFTRIAXONE PER 250 MG: Performed by: FAMILY MEDICINE

## 2025-05-16 PROCEDURE — P9612 CATHETERIZE FOR URINE SPEC: HCPCS

## 2025-05-16 PROCEDURE — 87636 SARSCOV2 & INF A&B AMP PRB: CPT | Performed by: FAMILY MEDICINE

## 2025-05-16 PROCEDURE — 87040 BLOOD CULTURE FOR BACTERIA: CPT | Performed by: FAMILY MEDICINE

## 2025-05-16 PROCEDURE — 87086 URINE CULTURE/COLONY COUNT: CPT | Performed by: FAMILY MEDICINE

## 2025-05-16 PROCEDURE — 84145 PROCALCITONIN (PCT): CPT | Performed by: FAMILY MEDICINE

## 2025-05-16 PROCEDURE — 25810000003 SODIUM CHLORIDE 0.9 % SOLUTION: Performed by: FAMILY MEDICINE

## 2025-05-16 PROCEDURE — 99285 EMERGENCY DEPT VISIT HI MDM: CPT | Performed by: FAMILY MEDICINE

## 2025-05-16 PROCEDURE — 82948 REAGENT STRIP/BLOOD GLUCOSE: CPT

## 2025-05-16 PROCEDURE — 87880 STREP A ASSAY W/OPTIC: CPT | Performed by: FAMILY MEDICINE

## 2025-05-16 PROCEDURE — 83735 ASSAY OF MAGNESIUM: CPT | Performed by: FAMILY MEDICINE

## 2025-05-16 RX ORDER — TRAZODONE HYDROCHLORIDE 150 MG/1
150 TABLET ORAL NIGHTLY
COMMUNITY

## 2025-05-16 RX ORDER — NICOTINE POLACRILEX 4 MG
15 LOZENGE BUCCAL
Status: DISCONTINUED | OUTPATIENT
Start: 2025-05-16 | End: 2025-05-18 | Stop reason: HOSPADM

## 2025-05-16 RX ORDER — ONDANSETRON 2 MG/ML
4 INJECTION INTRAMUSCULAR; INTRAVENOUS EVERY 6 HOURS PRN
Status: DISCONTINUED | OUTPATIENT
Start: 2025-05-16 | End: 2025-05-18 | Stop reason: HOSPADM

## 2025-05-16 RX ORDER — NITROGLYCERIN 0.4 MG/1
0.4 TABLET SUBLINGUAL
Status: DISCONTINUED | OUTPATIENT
Start: 2025-05-16 | End: 2025-05-18 | Stop reason: HOSPADM

## 2025-05-16 RX ORDER — FLUTICASONE PROPIONATE 50 MCG
2 SPRAY, SUSPENSION (ML) NASAL DAILY
Status: DISCONTINUED | OUTPATIENT
Start: 2025-05-16 | End: 2025-05-18 | Stop reason: HOSPADM

## 2025-05-16 RX ORDER — POLYETHYLENE GLYCOL 3350 17 G/17G
17 POWDER, FOR SOLUTION ORAL DAILY PRN
Status: DISCONTINUED | OUTPATIENT
Start: 2025-05-16 | End: 2025-05-18 | Stop reason: HOSPADM

## 2025-05-16 RX ORDER — BISACODYL 5 MG/1
5 TABLET, DELAYED RELEASE ORAL DAILY PRN
Status: DISCONTINUED | OUTPATIENT
Start: 2025-05-16 | End: 2025-05-18 | Stop reason: HOSPADM

## 2025-05-16 RX ORDER — DEXTROSE MONOHYDRATE 25 G/50ML
25 INJECTION, SOLUTION INTRAVENOUS
Status: DISCONTINUED | OUTPATIENT
Start: 2025-05-16 | End: 2025-05-18 | Stop reason: HOSPADM

## 2025-05-16 RX ORDER — CETIRIZINE HYDROCHLORIDE 10 MG/1
10 TABLET ORAL DAILY
Status: DISCONTINUED | OUTPATIENT
Start: 2025-05-16 | End: 2025-05-18 | Stop reason: HOSPADM

## 2025-05-16 RX ORDER — ONDANSETRON 4 MG/1
4 TABLET, ORALLY DISINTEGRATING ORAL EVERY 6 HOURS PRN
Status: DISCONTINUED | OUTPATIENT
Start: 2025-05-16 | End: 2025-05-18 | Stop reason: HOSPADM

## 2025-05-16 RX ORDER — DULOXETIN HYDROCHLORIDE 30 MG/1
120 CAPSULE, DELAYED RELEASE ORAL DAILY
Status: DISCONTINUED | OUTPATIENT
Start: 2025-05-16 | End: 2025-05-18 | Stop reason: HOSPADM

## 2025-05-16 RX ORDER — IBUPROFEN 600 MG/1
1 TABLET ORAL
Status: DISCONTINUED | OUTPATIENT
Start: 2025-05-16 | End: 2025-05-18 | Stop reason: HOSPADM

## 2025-05-16 RX ORDER — PANTOPRAZOLE SODIUM 40 MG/1
40 TABLET, DELAYED RELEASE ORAL DAILY
Status: DISCONTINUED | OUTPATIENT
Start: 2025-05-16 | End: 2025-05-18 | Stop reason: HOSPADM

## 2025-05-16 RX ORDER — AMOXICILLIN 250 MG
2 CAPSULE ORAL 2 TIMES DAILY PRN
Status: DISCONTINUED | OUTPATIENT
Start: 2025-05-16 | End: 2025-05-18 | Stop reason: HOSPADM

## 2025-05-16 RX ORDER — BISACODYL 10 MG
10 SUPPOSITORY, RECTAL RECTAL DAILY PRN
Status: DISCONTINUED | OUTPATIENT
Start: 2025-05-16 | End: 2025-05-18 | Stop reason: HOSPADM

## 2025-05-16 RX ORDER — TRAMADOL HYDROCHLORIDE 50 MG/1
50 TABLET ORAL EVERY 12 HOURS SCHEDULED
Status: DISCONTINUED | OUTPATIENT
Start: 2025-05-16 | End: 2025-05-17

## 2025-05-16 RX ORDER — ACETAMINOPHEN 325 MG/1
650 TABLET ORAL EVERY 4 HOURS PRN
Status: DISCONTINUED | OUTPATIENT
Start: 2025-05-16 | End: 2025-05-18 | Stop reason: HOSPADM

## 2025-05-16 RX ORDER — ACETAMINOPHEN 160 MG/5ML
650 SOLUTION ORAL EVERY 4 HOURS PRN
Status: DISCONTINUED | OUTPATIENT
Start: 2025-05-16 | End: 2025-05-18 | Stop reason: HOSPADM

## 2025-05-16 RX ORDER — GABAPENTIN 300 MG/1
300 CAPSULE ORAL EVERY 6 HOURS
Status: DISCONTINUED | OUTPATIENT
Start: 2025-05-16 | End: 2025-05-18 | Stop reason: HOSPADM

## 2025-05-16 RX ORDER — INSULIN LISPRO 100 [IU]/ML
2-7 INJECTION, SOLUTION INTRAVENOUS; SUBCUTANEOUS
Status: DISCONTINUED | OUTPATIENT
Start: 2025-05-16 | End: 2025-05-18 | Stop reason: HOSPADM

## 2025-05-16 RX ORDER — SODIUM CHLORIDE 9 MG/ML
125 INJECTION, SOLUTION INTRAVENOUS CONTINUOUS
Status: DISPENSED | OUTPATIENT
Start: 2025-05-16 | End: 2025-05-16

## 2025-05-16 RX ORDER — ACETAMINOPHEN 650 MG/1
650 SUPPOSITORY RECTAL EVERY 4 HOURS PRN
Status: DISCONTINUED | OUTPATIENT
Start: 2025-05-16 | End: 2025-05-18 | Stop reason: HOSPADM

## 2025-05-16 RX ADMIN — SODIUM CHLORIDE 1019 ML: 9 INJECTION, SOLUTION INTRAVENOUS at 13:17

## 2025-05-16 RX ADMIN — FLUTICASONE PROPIONATE 2 SPRAY: 50 SPRAY, METERED NASAL at 20:43

## 2025-05-16 RX ADMIN — CEFTRIAXONE SODIUM 1000 MG: 1 INJECTION, POWDER, FOR SOLUTION INTRAMUSCULAR; INTRAVENOUS at 14:45

## 2025-05-16 RX ADMIN — SODIUM CHLORIDE 250 ML/HR: 9 INJECTION, SOLUTION INTRAVENOUS at 14:07

## 2025-05-16 RX ADMIN — SODIUM CHLORIDE 1000 ML: 9 INJECTION, SOLUTION INTRAVENOUS at 11:49

## 2025-05-16 NOTE — Clinical Note
Level of Care: Med/Surg [1]   Diagnosis: Acute renal failure [096062]   Admitting Physician: EUGENIA BOSE [[id number]]   Attending Physician: EUGENIA BOSE [[id number]]   Certification: I Certify That Inpatient Hospital Services Are Medically Necessary For Greater Than 2 Midnights

## 2025-05-16 NOTE — PLAN OF CARE
Goal Outcome Evaluation: Patient admitted from the ER with complaints of dizziness, cough, runny nose for one week. Admitting diagnosis is acute renal failure and UTI. Urine has 1+ bacteria, creatinine is 3.83. No complaints of pain at this time. IV fluids infusing as ordered. Patient safety to be maintained this shift, continue to monitor and report abnormal to provide.

## 2025-05-16 NOTE — ED PROVIDER NOTES
"HPI:    Patient is a 49-year-old white female with complaint of cough congestion runny nose that has been present for a week with left greater than right ear pain.  She is also has sore throat and dizziness especially with changing positions.  She also states that her muscles in her body kind of \"hurts\".  No trauma.  No reported fever.  The patient denies any diarrhea or vomiting.  Family members also state that she seemed confused over the last 24 hours.  Speak with the patient did not see the confusion      REVIEW OF SYSTEMS      CONSTITUTIONAL: Positive for generalized fatigue and weakness   EYES:  No complaints of discharge   ENT: Positive for congestion and sore throat and ear pain   CARDIOVASCULAR:  No complaints of chest pain, palpitations, or swelling  RESPIRATORY: Positive for nonproductive cough and shortness of breath GI:  No complaints of abdominal pain, nausea, vomiting, or diarrhea  MUSCULOSKELETAL: Positive for generalized muscular pain and fatigue SKIN:  No complaints of rash  NEUROLOGIC: Positive for reported confusion by family.    ENDOCRINE:  No complaints of polyuria or polydipsia  LYMPHATIC:  No complaints of swollen glands  GENITOURINARY: No complaints of urinary frequency or hematuria        PAST MEDICAL HISTORY  Past Medical History:   Diagnosis Date    Acid reflux     ADHD (attention deficit hyperactivity disorder)     Allergic     Anxiety     Arthritis     Depression     Diabetes mellitus 2020    Pre diabetic    Dizziness 6 years ago    Due to Menieres disease    Fibromyalgia     Fibromyalgia, primary     HL (hearing loss) 2020    Meniere's disease    Hypertension     Low back pain 2012    Migraines     Neck ache     Tinnitus 6 years ago    Due to Menieres diease       FAMILY HISTORY  Family History   Problem Relation Age of Onset    Diabetes Mother         Tyoe 2    Obesity Mother     Osteoporosis Mother     Kidney cancer Mother     Anxiety disorder Mother     Cancer Mother         Kidney " cancer    Hypertension Mother     Osteoarthritis Mother     Heart disease Father     Diabetes Father         Type 2    Obesity Father     Hearing loss Father         Menieres    Hypertension Father     Breast cancer Maternal Aunt 65    Osteoporosis Maternal Grandmother     Ovarian cancer Neg Hx     Uterine cancer Neg Hx     Colon cancer Neg Hx     Melanoma Neg Hx     Colon polyps Neg Hx     Esophageal cancer Neg Hx        SOCIAL HISTORY  Social History     Socioeconomic History    Marital status:    Tobacco Use    Smoking status: Never     Passive exposure: Never    Smokeless tobacco: Never   Vaping Use    Vaping status: Never Used   Substance and Sexual Activity    Alcohol use: No    Drug use: No    Sexual activity: Yes     Partners: Male     Birth control/protection: OCP       IMMUNIZATION HISTORY  Deferred to primary care physician.    SURGICAL HISTORY  Past Surgical History:   Procedure Laterality Date     SECTION      CHOLECYSTECTOMY      GASTRIC SLEEVE LAPAROSCOPIC  2019    Dr. Yi       CURRENT MEDICATIONS    Current Facility-Administered Medications:     cefTRIAXone (ROCEPHIN) 1,000 mg in sodium chloride 0.9 % 100 mL MBP, 1,000 mg, Intravenous, Once, Sterling Montemayor Jr., MD    sodium chloride 0.9 % infusion, 250 mL/hr, Intravenous, Continuous, Sterling Montemayor Jr., MD, Last Rate: 250 mL/hr at 25 1407, 250 mL/hr at 25 1407    Current Outpatient Medications:     Accu-Chek FastClix Lancets misc, Use 1 each twice daily, Disp: 102 each, Rfl: 11    baclofen (LIORESAL) 10 MG tablet, Take 1 tablet by mouth 3 (Three) Times a Day As Needed., Disp: 90 tablet, Rfl: 1    Blood Glucose Monitoring Suppl (Accu-Chek Guide) w/Device kit, Use as directed, Disp: 1 kit, Rfl: 0    cetirizine (zyrTEC) 10 MG tablet, Take 1 tablet by mouth Daily., Disp: , Rfl:     Cholecalciferol (Vitamin D) 50 MCG (2000) tablet, Take 1 tablet by mouth Daily., Disp: 90 tablet, Rfl: 1    DULoxetine  "(Cymbalta) 60 MG capsule, Take 2 capsules by mouth Daily., Disp: 60 capsule, Rfl: 3    Ferrous Sulfate (IRON PO), Take 2 tablets by mouth Daily., Disp: , Rfl:     gabapentin (NEURONTIN) 300 MG capsule, Take 1 capsule by mouth Every 6 (Six) Hours., Disp: 120 capsule, Rfl: 1    glucose blood (Accu-Chek Guide Test) test strip, Use as instructed twice daily to test blood sugar, Disp: 100 each, Rfl: 11    glucose blood test strip, Use as instructed twice daily, Disp: 200 each, Rfl: 3    lamoTRIgine (LaMICtal) 150 MG tablet, Take 1 tablet by mouth 2 (Two) Times a Day., Disp: 60 tablet, Rfl: 3    Lancets (OneTouch Delica Plus [id number]) misc, Use as instructed twice daily, Disp: 200 each, Rfl: 3    lisinopril (PRINIVIL,ZESTRIL) 20 MG tablet, Take 1 tablet by mouth Daily., Disp: 90 tablet, Rfl: 1    metFORMIN (GLUCOPHAGE) 500 MG tablet, Take 1 tablet by mouth Daily With Breakfast., Disp: 90 tablet, Rfl: 1    pantoprazole (PROTONIX) 40 MG EC tablet, Take 1 tablet by mouth Daily., Disp: 90 tablet, Rfl: 1    traMADol (ULTRAM) 50 MG tablet, Take 1 tablet by mouth Every 12 (Twelve) Hours., Disp: 60 tablet, Rfl: 1    traZODone (DESYREL) 150 MG tablet, Take 1/3 -1 tablet by mouth every night at bedtime as directed, Disp: 30 tablet, Rfl: 3    ALLERGIES  Allergies   Allergen Reactions    Iron GI Intolerance         Respiratory Exam    VITAL SIGNS:   /54   Pulse 80   Temp 98.4 °F (36.9 °C) (Oral)   Resp 18   Ht 157.5 cm (62\")   Wt 93 kg (205 lb)   LMP  (LMP Unknown)   SpO2 95%   BMI 37.49 kg/m²     Constitutional: Patient is alert and in no distress.  Patient with general body discomfort.    ENT: positive posterior pharyngeal erythema is noted with dry oromucosa nose is clear with no drainage.  Tympanic membranes intact and with posterior tympanic fluid noted with no erythema.    Cardiovascular: S1-S2 regular rate and rhythm.  No murmur, rubs or gallops.    Respiratory: Decreased breath sounds in both bases but no " rhonchi or rales noted    Abdomen: Soft, nontender.  Bowel sounds are normal in all 4 quadrants.  There is no rebound or guarding noted.  There is no abdominal distention or hepatosplenomegaly..    Genitourinary: Patient is voiding appropriately.    Integument: No acute lesions noted.  Color appears to be normal.    Lorie Coma Scale: Total score 15    Neurological: Patient is alert and oriented x4 and no acute findings noted.  Speech is fluent and cognition is normal.  No evidence of acute CVA.  Cranial nerves II through XII intact.  Patient with normal motor function as well as reflexes and sensation.    Psychiatric: Normal affect and mood      RADIOLOGY/PROCEDURES    CT Head Without Contrast   Final Result   Impression:         No acute intracranial abnormality.       Air-fluid levels and opacification of the paranasal sinuses, correlate   for acute sinusitis.       This report was signed and finalized on 5/16/2025 1:02 PM by James Grover.          XR Chest 1 View   Final Result   1. A small ill-defined rounded density/nodule projecting over the left   hilum behind the cardiac silhouette probably represent a vessel. A   follow-up examination in PA projection may be obtained for further   evaluation of this area.   2. No active cardiopulmonary disease.                   This report was signed and finalized on 5/16/2025 12:06 PM by Dr. Vance Adams MD.                FUTURE APPOINTMENTS     Future Appointments   Date Time Provider Department Center   9/29/2025  2:00 PM Miguel Mckenzie, DO [facility]          COURSE & MEDICAL DECISION MAKING     Patient's partial differential diagnosis can include:    Pneumonia, pneumonitis, bronchitis, UTI, strep pharyngitis, electrolyte abnormality, bronchiolitis, COPD exacerbation, congestive heart failure, asthma exacerbation, pulmonary embolism, pneumothorax, pleural effusion, pulmonary edema, empyema,  atelectasis,viral pneumonia, and others    CBC, CMP,  magnesium, COVID, urinalysis, strep, CT scan of the head, and chest x-ray will all be ordered.  Patient will get a bolus of saline at a sepsis bolus.    Patient CBC is noted to be mildly elevated at 13.7.  Patient is noted to also be in acute renal failure with a BUN of 42 and a creatinine of 3.83.  Upon returning from the CT scan patient was noted to be mildly hypotensive.  This is improved with fluid bolus.  She has received a total of 2 L of saline and now is getting saline at 250 mL/h.  With blood pressures improving systolic of the from the low blood pressure of 75/38 to now 98/58 with the last blood pressure of 119/54 patient will be stable for floor admission and further treatment of her acute renal failure urinalysis is still pending.    Called hospitalist for admission.  Urinalysis has now returned and is noted to have some positive leuk esterase as well as bacteria 1+.  Will treat with Rocephin.    Spoke with nurse practitioner Priscilla Krueger about the patient and the patient will be admitted to Dr. Cosby.      Patient's level of risk: Moderate        CRITICAL CARE    CRITICAL CARE: No    CRITICAL CARE TIME: None        Also Old charts were reviewed per [facility] EMR.  Pertinent details are summarized above.  All laboratory, radiologic, and EKG studies that were performed in the Emergency Department were a necessary part of the evaluation needed to exclude unstable or emergent medical conditions:     Patient was hemodynamically and neurologically stable in the ED.   Pertinent studies were reviewed as above.     Recent Results (from the past 24 hours)   Green Top (Gel)    Collection Time: 05/16/25 11:09 AM   Result Value Ref Range    Extra Tube Hold for add-ons.    Lavender Top    Collection Time: 05/16/25 11:09 AM   Result Value Ref Range    Extra Tube hold for add-on    Gray Top    Collection Time: 05/16/25 11:09 AM   Result Value Ref Range    Extra Tube Hold for add-ons.    Light Blue Top    Collection  Time: 05/16/25 11:09 AM   Result Value Ref Range    Extra Tube Hold for add-ons.    Comprehensive Metabolic Panel    Collection Time: 05/16/25 11:09 AM    Specimen: Blood   Result Value Ref Range    Glucose 110 (H) 65 - 99 mg/dL    BUN 42 (H) 6 - 20 mg/dL    Creatinine 3.83 (H) 0.57 - 1.00 mg/dL    Sodium 139 136 - 145 mmol/L    Potassium 4.2 3.5 - 5.2 mmol/L    Chloride 99 98 - 107 mmol/L    CO2 23.0 22.0 - 29.0 mmol/L    Calcium 9.3 8.6 - 10.5 mg/dL    Total Protein 7.6 6.0 - 8.5 g/dL    Albumin 3.9 3.5 - 5.2 g/dL    ALT (SGPT) 14 1 - 33 U/L    AST (SGOT) 19 1 - 32 U/L    Alkaline Phosphatase 112 39 - 117 U/L    Total Bilirubin 0.4 0.0 - 1.2 mg/dL    Globulin 3.7 gm/dL    A/G Ratio 1.1 g/dL    BUN/Creatinine Ratio 11.0 7.0 - 25.0    Anion Gap 17.0 (H) 5.0 - 15.0 mmol/L    eGFR 13.8 (L) >60.0 mL/min/1.73   Magnesium    Collection Time: 05/16/25 11:09 AM    Specimen: Blood   Result Value Ref Range    Magnesium 2.3 1.6 - 2.6 mg/dL   CBC Auto Differential    Collection Time: 05/16/25 11:09 AM    Specimen: Blood   Result Value Ref Range    WBC 13.70 (H) 3.40 - 10.80 10*3/mm3    RBC 4.75 3.77 - 5.28 10*6/mm3    Hemoglobin 11.0 (L) 12.0 - 15.9 g/dL    Hematocrit 34.8 34.0 - 46.6 %    MCV 73.3 (L) 79.0 - 97.0 fL    MCH 23.2 (L) 26.6 - 33.0 pg    MCHC 31.6 31.5 - 35.7 g/dL    RDW 17.2 (H) 12.3 - 15.4 %    RDW-SD 45.6 37.0 - 54.0 fl    MPV 10.1 6.0 - 12.0 fL    Platelets 424 140 - 450 10*3/mm3    Neutrophil % 78.5 (H) 42.7 - 76.0 %    Lymphocyte % 9.2 (L) 19.6 - 45.3 %    Monocyte % 9.4 5.0 - 12.0 %    Eosinophil % 0.7 0.3 - 6.2 %    Basophil % 0.7 0.0 - 1.5 %    Immature Grans % 1.5 (H) 0.0 - 0.5 %    Neutrophils, Absolute 10.74 (H) 1.70 - 7.00 10*3/mm3    Lymphocytes, Absolute 1.26 0.70 - 3.10 10*3/mm3    Monocytes, Absolute 1.29 (H) 0.10 - 0.90 10*3/mm3    Eosinophils, Absolute 0.10 0.00 - 0.40 10*3/mm3    Basophils, Absolute 0.10 0.00 - 0.20 10*3/mm3    Immature Grans, Absolute 0.21 (H) 0.00 - 0.05 10*3/mm3   Manual  Differential    Collection Time: 05/16/25 11:09 AM    Specimen: Blood   Result Value Ref Range    Neutrophil % 71.3 42.7 - 76.0 %    Lymphocyte % 4.5 (L) 19.6 - 45.3 %    Monocyte % 8.1 5.0 - 12.0 %    Eosinophil % 0.4 0.3 - 6.2 %    Basophil % 0.0 0.0 - 1.5 %    Bands %  12.1 (H) 0.0 - 5.0 %    Metamyelocyte % 0.4 (H) 0.0 - 0.0 %    Atypical Lymphocyte % 3.2 0.0 - 5.0 %    Neutrophils Absolute 11.43 (H) 1.70 - 7.00 10*3/mm3    Lymphocytes Absolute 1.05 0.70 - 3.10 10*3/mm3    Monocytes Absolute 1.11 (H) 0.10 - 0.90 10*3/mm3    Eosinophils Absolute 0.05 0.00 - 0.40 10*3/mm3    Basophils Absolute 0.00 0.00 - 0.20 10*3/mm3    Acanthocytes Slight/1+ None Seen    Anisocytosis Mod/2+ None Seen    Hypochromia Slight/1+ None Seen    Microcytes Mod/2+ None Seen    Poikilocytes Slight/1+ None Seen    Polychromasia Slight/1+ None Seen    WBC Morphology Normal Normal    Clumped Platelets Present None Seen    Giant Platelets Slight/1+ None Seen   COVID-19 and FLU A/B PCR, 1 HR TAT - Swab, Nasopharynx    Collection Time: 05/16/25 11:49 AM    Specimen: Nasopharynx; Swab   Result Value Ref Range    COVID19 Not Detected Not Detected - Ref. Range    Influenza A PCR Not Detected Not Detected    Influenza B PCR Not Detected Not Detected   Rapid Strep A Screen - Swab, Throat    Collection Time: 05/16/25 11:49 AM    Specimen: Throat; Swab   Result Value Ref Range    Strep A Ag Negative Negative   Urinalysis With Culture If Indicated - Straight Cath    Collection Time: 05/16/25  1:16 PM    Specimen: Straight Cath; Urine   Result Value Ref Range    Color, UA Dark Yellow (A) Yellow, Straw    Appearance, UA Turbid (A) Clear    pH, UA <=5.0 5.0 - 8.0    Specific Gravity, UA 1.025 1.005 - 1.030    Glucose, UA Negative Negative    Ketones, UA 15 mg/dL (1+) (A) Negative    Bilirubin, UA Small (1+) (A) Negative    Blood, UA Negative Negative    Protein,  mg/dL (2+) (A) Negative    Leuk Esterase, UA Trace (A) Negative    Nitrite, UA  Negative Negative    Urobilinogen, UA 1.0 E.U./dL 0.2 - 1.0 E.U./dL   Urinalysis, Microscopic Only - Straight Cath    Collection Time: 05/16/25  1:16 PM    Specimen: Straight Cath; Urine   Result Value Ref Range    RBC, UA 3-5 (A) None Seen, 0-2 /HPF    WBC, UA 6-10 (A) None Seen, 0-2 /HPF    Bacteria, UA 1+ (A) None Seen /HPF    Squamous Epithelial Cells, UA 3-6 (A) None Seen, 0-2 /HPF    Hyaline Casts, UA 0-2 None Seen /LPF    Granular Casts, UA 7-12 None Seen /LPF    Amorphous Crystals, UA Small/1+ None Seen /HPF    Methodology Manual Light Microscopy        The patient received:  Medications   sodium chloride 0.9 % infusion (250 mL/hr Intravenous New Bag 5/16/25 1407)   cefTRIAXone (ROCEPHIN) 1,000 mg in sodium chloride 0.9 % 100 mL MBP (has no administration in time range)   sodium chloride 0.9 % bolus 1,000 mL (0 mL Intravenous Stopped 5/16/25 1316)   sodium chloride 0.9 % bolus 2,019 mL (1,019 mL Intravenous New Bag 5/16/25 1317)            ED Disposition       ED Disposition   Intended Admit    Condition   --    Comment   --                   Dragon disclaimer:  Part of this note may be an electronic transcription/translation of spoken language to printed text using the Dragon Dictation System.     I have reviewed the patient’s prescription history via a prescription monitoring program.  This information is consistent with my knowledge of the patient’s controlled substance use history.    FINAL IMPRESSION   Diagnosis Plan   1. Acute renal failure, unspecified acute renal failure type        2. Dizziness        3. Urinary tract infection without hematuria, site unspecified        4. Other acute sinusitis, recurrence not specified              MD Sim Green Jr, Thomas Mark Jr., MD  05/16/25 3857

## 2025-05-16 NOTE — H&P
[facility] Medicine Services  HISTORY AND PHYSICAL    Date of Admission: 5/16/2025  Primary Care Physician: Miguel Mckenzie,     Subjective   Primary Historian: Patient and     Chief Complaint: Severe vertigo    History of Present Illness  Maribel Whitaker is a 49-year-old female with a past medical history of ADHD, acid reflux, anxiety/depression, diet-controlled diabetes, Ménière's disease, fibromyalgia, hypertension, migraine headaches.  Patient presented to urgent care with complaints of severe vertigo.  She reported her PCP has been following and was supposed to start physical therapy however over the past 3 days vertigo has worsened to the point she is unable to walk.  She presented to urgent care via wheelchair.  Been reported poor memory, difficulty speaking, falling asleep quickly.  Patient reported blurred vision.  It is documented patient is uncapped with close inside out.  With concerns for CVA she was transported to ED for evaluation.  ED workup revealed sepsis, ALIN with a creatinine of 3.83, she does have a bandemia of 12%, UTI, acute sinusitis.  At time of my assessment patient did not arouse with sternal rub.  With continued stimuli sternal rub, patting upper extremities she finally awakened.  She says she was at work today.  I have no way of verifying that.  From documentation it appears she has been too sick to work over the past 3 days.   is not available at this time.  She is oriented x 3 after awakening.  Nurse did report she had an episode of nonresponsiveness earlier with a drop in oxygen saturation prompting addition of oxygen 2 L nasal cannula.  She denies pain.  She does repeat the same questions.  She has been taking Dramamine and I feel this is playing a part in her somnolence/metabolic encephalopathy due to ALIN.  Patient also takes multiple sedating home medications.  We will hold those for now.  She is admitted for further  evaluation and treatment.    Review of Systems   Otherwise complete ROS reviewed and negative except as mentioned in the HPI.    Past Medical History:   Past Medical History:   Diagnosis Date    Acid reflux     ADHD (attention deficit hyperactivity disorder)     Allergic     Anxiety     Arthritis     Depression     Diabetes mellitus     Pre diabetic    Dizziness 6 years ago    Due to Menieres disease    Fibromyalgia     Fibromyalgia, primary     HL (hearing loss)     Meniere's disease    Hypertension     Low back pain     Migraines     Neck ache     Tinnitus 6 years ago    Due to Menieres diease     Past Surgical History:  Past Surgical History:   Procedure Laterality Date     SECTION      CHOLECYSTECTOMY      GASTRIC SLEEVE LAPAROSCOPIC  2019    Dr. Yi     Social History:  reports that she has never smoked. She has never been exposed to tobacco smoke. She has never used smokeless tobacco. She reports that she does not drink alcohol and does not use drugs.    Family History: family history includes Anxiety disorder in her mother; Breast cancer (age of onset: 65) in her maternal aunt; Cancer in her mother; Diabetes in her father and mother; Hearing loss in her father; Heart disease in her father; Hypertension in her father and mother; Kidney cancer in her mother; Obesity in her father and mother; Osteoarthritis in her mother; Osteoporosis in her maternal grandmother and mother.       Allergies:  Allergies   Allergen Reactions    Iron GI Intolerance       Medications:  Prior to Admission medications    Medication Sig Start Date End Date Taking? Authorizing Provider   Accu-Chek FastClix Lancets misc Use 1 each twice daily 25      baclofen (LIORESAL) 10 MG tablet Take 1 tablet by mouth 3 (Three) Times a Day As Needed. 3/6/25      Blood Glucose Monitoring Suppl (Accu-Chek Guide) w/Device kit Use as directed 25      cetirizine (zyrTEC) 10 MG tablet Take 1 tablet by mouth Daily.     Provider, MD Oksana   Cholecalciferol (Vitamin D) 50 MCG (2000 UT) tablet Take 1 tablet by mouth Daily. 12/10/24   Alka De La Cruz APRN   DULoxetine (Cymbalta) 60 MG capsule Take 2 capsules by mouth Daily. 3/5/25      Ferrous Sulfate (IRON PO) Take 2 tablets by mouth Daily.    Provider, MD Oksana   gabapentin (NEURONTIN) 300 MG capsule Take 1 capsule by mouth Every 6 (Six) Hours. 3/6/25      glucose blood (Accu-Chek Guide Test) test strip Use as instructed twice daily to test blood sugar 5/8/25      glucose blood test strip Use as instructed twice daily 3/11/24   Alka De La Cruz APRN   lamoTRIgine (LaMICtal) 150 MG tablet Take 1 tablet by mouth 2 (Two) Times a Day. 3/5/25      Lancets (OneTouch Delica Plus [id number]) misc Use as instructed twice daily 3/11/24      lisinopril (PRINIVIL,ZESTRIL) 20 MG tablet Take 1 tablet by mouth Daily. 5/5/25   Miguel Mckenzie,    metFORMIN (GLUCOPHAGE) 500 MG tablet Take 1 tablet by mouth Daily With Breakfast. 5/2/25   Miguel Mckenzie,    pantoprazole (PROTONIX) 40 MG EC tablet Take 1 tablet by mouth Daily. 5/5/25   Miguel Mckenzie,    traMADol (ULTRAM) 50 MG tablet Take 1 tablet by mouth Every 12 (Twelve) Hours. 3/6/25      traZODone (DESYREL) 150 MG tablet Take 1/3 -1 tablet by mouth every night at bedtime as directed 3/5/25      baclofen (LIORESAL) 10 MG tablet Take 1 tablet by mouth three times daily as needed. 5/5/25 5/16/25     gabapentin (NEURONTIN) 300 MG capsule Take 1 capsule by mouth four times a day 5/14/25 5/16/25     meclizine (ANTIVERT) 25 MG tablet Take 1 tablet by mouth 2 (Two) Times a Day As Needed for Dizziness. 5/15/25 5/16/25  Miguel Mckenzie, DO     I have utilized all available immediate resources to obtain, update, or review the patient's current medications (including all prescriptions, over-the-counter products, herbals, cannabis/cannabidiol products, and vitamin/mineral/dietary (nutritional)  "supplements).    Objective     Vital Signs: BP 98/57   Pulse 75   Temp 98.4 °F (36.9 °C) (Oral)   Resp 16   Ht 157.5 cm (62\")   Wt 93 kg (205 lb)   LMP  (LMP Unknown)   SpO2 97%   BMI 37.49 kg/m²   Physical Exam  Vitals reviewed.   Constitutional:       Appearance: She is obese. She is ill-appearing.      Comments: Episodes of nonresponsive to stimuli   HENT:      Head: Normocephalic and atraumatic.      Mouth/Throat:      Mouth: Mucous membranes are dry.      Pharynx: Oropharynx is clear.   Eyes:      Conjunctiva/sclera: Conjunctivae normal.      Pupils: Pupils are equal, round, and reactive to light.      Comments: Disconjugate gaze   Cardiovascular:      Rate and Rhythm: Normal rate and regular rhythm.   Pulmonary:      Effort: Pulmonary effort is normal.      Breath sounds: Normal breath sounds.   Abdominal:      General: Abdomen is protuberant.      Palpations: Abdomen is soft.   Musculoskeletal:      Cervical back: Neck supple.      Right lower leg: No edema.      Left lower leg: No edema.   Skin:     General: Skin is warm and dry.   Neurological:      Mental Status: She is oriented to person, place, and time.      Comments: Somnolence, easily drifts off to sleep with difficulty awakening-unresponsive in nature   Psychiatric:         Mood and Affect: Mood normal. Affect is flat.          Results Reviewed:  Lab Results (last 24 hours)       Procedure Component Value Units Date/Time    Hemoglobin A1c [[id number]]  (Abnormal) Collected: 05/16/25 1109    Specimen: Blood Updated: 05/16/25 1524     Hemoglobin A1C 5.80 %     Narrative:      Hemoglobin A1C Ranges:    Increased Risk for Diabetes  5.7% to 6.4%  Diabetes                     >= 6.5%  Diabetic Goal                < 7.0%    Procalcitonin [[id number]]  (Normal) Collected: 05/16/25 1440    Specimen: Blood Updated: 05/16/25 1515     Procalcitonin 0.25 ng/mL     Narrative:      As a Marker for Sepsis (Non-Neonates):    1. <0.5 ng/mL represents a low risk " "of severe sepsis and/or septic shock.  2. >2 ng/mL represents a high risk of severe sepsis and/or septic shock.    As a Marker for Lower Respiratory Tract Infections that require antibiotic therapy:    PCT on Admission    Antibiotic Therapy       6-12 Hrs later    >0.5                Strongly Recommended  >0.25 - <0.5        Recommended   0.1 - 0.25          Discouraged              Remeasure/reassess PCT  <0.1                Strongly Discouraged     Remeasure/reassess PCT    As 28 day mortality risk marker: \"Change in Procalcitonin Result\" (>80% or <=80%) if Day 0 (or Day 1) and Day 4 values are available. Refer to http://www.[email or web address] [email or web address]-pct-calculator.com    Change in PCT <=80%  A decrease of PCT levels below or equal to 80% defines a positive change in PCT test result representing a higher risk for 28-day all-cause mortality of patients diagnosed with severe sepsis for septic shock.    Change in PCT >80%  A decrease of PCT levels of more than 80% defines a negative change in PCT result representing a lower risk for 28-day all-cause mortality of patients diagnosed with severe sepsis or septic shock.       Lactic Acid, Plasma [[id number]]  (Normal) Collected: 05/16/25 1440    Specimen: Blood Updated: 05/16/25 1505     Lactate 0.8 mmol/L     Blood Culture - Blood, Arm, Right [[id number]] Collected: 05/16/25 1445    Specimen: Blood from Arm, Right Updated: 05/16/25 1459    Blood Culture - Blood, Hand, Left [[id number]] Collected: 05/16/25 1440    Specimen: Blood from Hand, Left Updated: 05/16/25 1459    Fentanyl, Urine - Straight Cath [[id number]]  (Normal) Collected: 05/16/25 1316    Specimen: Urine from Straight Cath Updated: 05/16/25 1439     Fentanyl, Urine Negative    Narrative:      Negative Threshold:      Fentanyl 5 ng/mL     The normal value for the drug tested is negative. This report includes final unconfirmed screening results to be used for medical treatment purposes only. Unconfirmed results must not be used for " non-medical purposes such as employment or legal testing. Clinical consideration should be applied to any drug of abuse test, particularly when unconfirmed results are used.           Urine Drug Screen - Straight Cath [[id number]]  (Normal) Collected: 05/16/25 1316    Specimen: Urine from Straight Cath Updated: 05/16/25 1434     THC, Screen, Urine Negative     Phencyclidine (PCP), Urine Negative     Cocaine Screen, Urine Negative     Methamphetamine, Ur Negative     Opiate Screen Negative     Amphetamine Screen, Urine Negative     Benzodiazepine Screen, Urine Negative     Tricyclic Antidepressants Screen Negative     Methadone Screen, Urine Negative     Barbiturates Screen, Urine Negative     Oxycodone Screen, Urine Negative     Buprenorphine, Screen, Urine Negative    Narrative:      Cutoff For Drugs Screened:    Amphetamines               500 ng/ml  Barbiturates               200 ng/ml  Benzodiazepines            150 ng/ml  Cocaine                    150 ng/ml  Methadone                  200 ng/ml  Opiates                    100 ng/ml  Phencyclidine               25 ng/ml  THC                         50 ng/ml  Methamphetamine            500 ng/ml  Tricyclic Antidepressants  300 ng/ml  Oxycodone                  100 ng/ml  Buprenorphine               10 ng/ml    The normal value for all drugs tested is negative. This report includes unconfirmed screening results, with the cutoff values listed, to be used for medical treatment purposes only.  Unconfirmed results must not be used for non-medical purposes such as employment or legal testing.  Clinical consideration should be applied to any drug of abuse test, particularly when unconfirmed results are used.      Urinalysis With Culture If Indicated - Straight Cath [[id number]]  (Abnormal) Collected: 05/16/25 1316    Specimen: Urine from Straight Cath Updated: 05/16/25 1421     Color, UA Dark Yellow     Appearance, UA Turbid     pH, UA <=5.0     Specific Gravity, UA 1.025      Glucose, UA Negative     Ketones, UA 15 mg/dL (1+)     Bilirubin, UA Small (1+)     Blood, UA Negative     Protein,  mg/dL (2+)     Leuk Esterase, UA Trace     Nitrite, UA Negative     Urobilinogen, UA 1.0 E.U./dL    Narrative:      In absence of clinical symptoms, the presence of pyuria, bacteria, and/or nitrites on the urinalysis result does not correlate with infection.    Urinalysis, Microscopic Only - Straight Cath [[id number]]  (Abnormal) Collected: 05/16/25 1316    Specimen: Urine from Straight Cath Updated: 05/16/25 1421     RBC, UA 3-5 /HPF      WBC, UA 6-10 /HPF      Bacteria, UA 1+ /HPF      Squamous Epithelial Cells, UA 3-6 /HPF      Hyaline Casts, UA 0-2 /LPF      Granular Casts, UA 7-12 /LPF      Amorphous Crystals, UA Small/1+ /HPF      Methodology Manual Light Microscopy    Urine Culture - Urine, Straight Cath [[id number]] Collected: 05/16/25 1316    Specimen: Urine from Straight Cath Updated: 05/16/25 1420    COVID PRE-OP / PRE-PROCEDURE SCREENING ORDER (NO ISOLATION) - Swab, Nasopharynx [[id number]]  (Normal) Collected: 05/16/25 1149    Specimen: Swab from Nasopharynx Updated: 05/16/25 1218    Narrative:      The following orders were created for panel order COVID PRE-OP / PRE-PROCEDURE SCREENING ORDER (NO ISOLATION) - Swab, Nasopharynx.  Procedure                               Abnormality         Status                     ---------                               -----------         ------                     COVID-19 and FLU A/B PCR...[[id number]]  Normal              Final result                 Please view results for these tests on the individual orders.    COVID-19 and FLU A/B PCR, 1 HR TAT - Swab, Nasopharynx [[id number]]  (Normal) Collected: 05/16/25 1149    Specimen: Swab from Nasopharynx Updated: 05/16/25 1218     COVID19 Not Detected     Influenza A PCR Not Detected     Influenza B PCR Not Detected    Narrative:      Fact sheet for providers:  https://www.fda.gov/media/527298/download    Fact sheet for patients: https://www.fda.gov/media/872516/download    Test performed by PCR.    Manual Differential [[id number]]  (Abnormal) Collected: 05/16/25 1109    Specimen: Blood Updated: 05/16/25 1217     Neutrophil % 71.3 %      Lymphocyte % 4.5 %      Monocyte % 8.1 %      Eosinophil % 0.4 %      Basophil % 0.0 %      Bands %  12.1 %      Metamyelocyte % 0.4 %      Atypical Lymphocyte % 3.2 %      Neutrophils Absolute 11.43 10*3/mm3      Lymphocytes Absolute 1.05 10*3/mm3      Monocytes Absolute 1.11 10*3/mm3      Eosinophils Absolute 0.05 10*3/mm3      Basophils Absolute 0.00 10*3/mm3      Acanthocytes Slight/1+     Anisocytosis Mod/2+     Hypochromia Slight/1+     Microcytes Mod/2+     Poikilocytes Slight/1+     Polychromasia Slight/1+     WBC Morphology Normal     Clumped Platelets Present     Giant Platelets Slight/1+    CBC & Differential [[id number]]  (Abnormal) Collected: 05/16/25 1109    Specimen: Blood Updated: 05/16/25 1216    Narrative:      The following orders were created for panel order CBC & Differential.  Procedure                               Abnormality         Status                     ---------                               -----------         ------                     CBC Auto Differential[[id number]]        Abnormal            Final result                 Please view results for these tests on the individual orders.    CBC Auto Differential [[id number]]  (Abnormal) Collected: 05/16/25 1109    Specimen: Blood Updated: 05/16/25 1216     WBC 13.70 10*3/mm3      RBC 4.75 10*6/mm3      Hemoglobin 11.0 g/dL      Hematocrit 34.8 %      MCV 73.3 fL      MCH 23.2 pg      MCHC 31.6 g/dL      RDW 17.2 %      RDW-SD 45.6 fl      MPV 10.1 fL      Platelets 424 10*3/mm3      Neutrophil % 78.5 %      Lymphocyte % 9.2 %      Monocyte % 9.4 %      Eosinophil % 0.7 %      Basophil % 0.7 %      Immature Grans % 1.5 %      Neutrophils, Absolute 10.74 10*3/mm3       Lymphocytes, Absolute 1.26 10*3/mm3      Monocytes, Absolute 1.29 10*3/mm3      Eosinophils, Absolute 0.10 10*3/mm3      Basophils, Absolute 0.10 10*3/mm3      Immature Grans, Absolute 0.21 10*3/mm3     Rapid Strep A Screen - Swab, Throat [[id number]]  (Normal) Collected: 05/16/25 1149    Specimen: Swab from Throat Updated: 05/16/25 1209     Strep A Ag Negative    Beta Strep Culture, Throat - Swab, Throat [[id number]] Collected: 05/16/25 1149    Specimen: Swab from Throat Updated: 05/16/25 1209    Comprehensive Metabolic Panel [[id number]]  (Abnormal) Collected: 05/16/25 1109    Specimen: Blood Updated: 05/16/25 1142     Glucose 110 mg/dL      BUN 42 mg/dL      Creatinine 3.83 mg/dL      Sodium 139 mmol/L      Potassium 4.2 mmol/L      Chloride 99 mmol/L      CO2 23.0 mmol/L      Calcium 9.3 mg/dL      Total Protein 7.6 g/dL      Albumin 3.9 g/dL      ALT (SGPT) 14 U/L      AST (SGOT) 19 U/L      Alkaline Phosphatase 112 U/L      Total Bilirubin 0.4 mg/dL      Globulin 3.7 gm/dL      A/G Ratio 1.1 g/dL      BUN/Creatinine Ratio 11.0     Anion Gap 17.0 mmol/L      eGFR 13.8 mL/min/1.73     Narrative:      GFR Categories in Chronic Kidney Disease (CKD)              GFR Category          GFR (mL/min/1.73)    Interpretation  G1                    90 or greater        Normal or high (1)  G2                    60-89                Mild decrease (1)  G3a                   45-59                Mild to moderate decrease  G3b                   30-44                Moderate to severe decrease  G4                    15-29                Severe decrease  G5                    14 or less           Kidney failure    (1)In the absence of evidence of kidney disease, neither GFR category G1 or G2 fulfill the criteria for CKD.    eGFR calculation 2021 CKD-EPI creatinine equation, which does not include race as a factor    Magnesium [[id number]]  (Normal) Collected: 05/16/25 1109    Specimen: Blood Updated: 05/16/25 1142     Magnesium  2.3 mg/dL     [place] Draw [[id number]] Collected: 05/16/25 1109    Specimen: Blood Updated: 05/16/25 1115    Narrative:      The following orders were created for panel order [place] Draw.  Procedure                               Abnormality         Status                     ---------                               -----------         ------                     Green Top (Gel)[[id number]]                                  Final result               Lavender Top[[id number]]                                     Final result               Cortes Top[[id number]]                                         Final result               Light Blue Top[[id number]]                                   Final result                 Please view results for these tests on the individual orders.    Green Top (Gel) [[id number]] Collected: 05/16/25 1109    Specimen: Blood Updated: 05/16/25 1115     Extra Tube Hold for add-ons.     Comment: Auto resulted.       Lavender Top [[id number]] Collected: 05/16/25 1109    Specimen: Blood Updated: 05/16/25 1115     Extra Tube hold for add-on     Comment: Auto resulted       Gray Top [[id number]] Collected: 05/16/25 1109    Specimen: Blood Updated: 05/16/25 1115     Extra Tube Hold for add-ons.     Comment: Auto resulted.       Light Blue Top [[id number]] Collected: 05/16/25 1109    Specimen: Blood Updated: 05/16/25 1115     Extra Tube Hold for add-ons.     Comment: Auto resulted             Imaging Results (Last 24 Hours)       Procedure Component Value Units Date/Time    CT Head Without Contrast [[id number]] Collected: 05/16/25 1259     Updated: 05/16/25 1305    Narrative:      Exam: CT HEAD WO CONTRAST- 5/16/2025 11:45 AM     HISTORY: Family reports confusion       DOSE LENGTH PRODUCT: 852.72 mGy.cm mGy cm. Automated exposure control  was also utilized to decrease patient radiation dose.     Technique:  Helically acquired CT of the brain without IV contrast was performed.  Sagittal and coronal reformations are  also provided for review. Soft  tissue and bone kernels are available for interpretation.     Comparison: 9/19/2024.     Findings:     Ventricles and extra-axial CSF spaces are normal in size.     No intraparenchymal or extra-axial hemorrhage.     Gray-white matter differentiation is preserved.     Orbits are grossly unremarkable. Air-fluid levels within the bilateral  frontal, sphenoid, and maxillary sinuses with subtotal opacification of  the ethmoid air cells. Mastoid air cells are grossly clear.     No suspicious calvarial or extracranial soft tissue abnormality.       Impression:      Impression:       No acute intracranial abnormality.     Air-fluid levels and opacification of the paranasal sinuses, correlate  for acute sinusitis.     This report was signed and finalized on 5/16/2025 1:02 PM by James Grover.       XR Chest 1 View [[id number]] Collected: 05/16/25 1204     Updated: 05/16/25 1209    Narrative:      EXAMINATION: XR CHEST 1 VW-     5/16/2025 11:02 AM     HISTORY: Cough     A frontal projection of the chest is compared with the previous study  dated 12/20/2024.     The lungs are moderately well-expanded.     There is a poorly defined nodule projected over the left hilum which  probably represent a vessel. A follow-up examination with particular  attention to this area may be obtained.     Moderate elevation right diaphragm appears more pronounced due to  lordotic projection.     There is no evidence of recent infiltrate, pleural effusion, pulmonary  congestion or pneumothorax.     The heart size in the normal range.     No acute bony abnormality.       Impression:      1. A small ill-defined rounded density/nodule projecting over the left  hilum behind the cardiac silhouette probably represent a vessel. A  follow-up examination in PA projection may be obtained for further  evaluation of this area.  2. No active cardiopulmonary disease.              This report was signed and finalized on 5/16/2025  12:06 PM by Dr. Vance Adams MD.               Assessment / Plan   Assessment:   Active Hospital Problems    Diagnosis     **Acute renal failure     Sepsis with acute organ dysfunction     Acute sinusitis     Acute cystitis with hematuria     Metabolic encephalopathy        Treatment Plan  The patient will be admitted to Dr. Cosby's service here at [facility].     1.  Sepsis with acute organ dysfunction; patient received fluid bolus in the emergency department, labs in a.m., antibiotics started    2.  Acute renal failure; continue fluid infusion 150 mL/hour, labs in a.m.    3.  Acute cystitis with hematuria/acute sinusitis; Rocephin 2 g IV daily, Flonase, continue home Zyrtec    4.  Metabolic encephalopathy; neurochecks, hold home sedating medication    Home medications reviewed and restarted as appropriate  DVT prophylaxis with SCDs    Medical Decision Making  Number and Complexity of problems: 5  For problems, acute, high complexity, unchanged  Differential Diagnosis: None    Conditions and Status        Condition is unchanged.     MDM Data  External documents reviewed: Epic record  Cardiac tracing (EKG, telemetry) interpretation: Reviewed  Radiology interpretation: Reviewed  Labs reviewed: Yes  Any tests that were considered but not ordered: No     Decision rules/scores evaluated (example [id number], Wells, etc): No     Discussed with: Patient and Dr. Cosby     Care Planning  Shared decision making: Patient and Dr. Cosby  Code status and discussions: Full    Disposition  Social Determinants of Health that impact treatment or disposition: None   Estimated length of stay is 2+ days.     I confirmed that the patient's advanced care plan is present, code status is documented, and a surrogate decision maker is listed in the patient's medical record.     The patient's surrogate decision maker is .     The patient was seen and examined by me on 5/16/2025 at 2:26  PM.    Electronically signed by KAYLYNN Major, 05/16/25, 16:51 CDT.

## 2025-05-17 LAB
ANION GAP SERPL CALCULATED.3IONS-SCNC: 13 MMOL/L (ref 5–15)
ANISOCYTOSIS BLD QL: ABNORMAL
BACTERIA BLD CULT: ABNORMAL
BASOPHILS # BLD MANUAL: 0 10*3/MM3 (ref 0–0.2)
BASOPHILS NFR BLD MANUAL: 0 % (ref 0–1.5)
BOTTLE TYPE: ABNORMAL
BUN SERPL-MCNC: 27 MG/DL (ref 6–20)
BUN/CREAT SERPL: 16.9 (ref 7–25)
CALCIUM SPEC-SCNC: 8.8 MG/DL (ref 8.6–10.5)
CHLORIDE SERPL-SCNC: 104 MMOL/L (ref 98–107)
CO2 SERPL-SCNC: 23 MMOL/L (ref 22–29)
CREAT SERPL-MCNC: 1.6 MG/DL (ref 0.57–1)
DEPRECATED RDW RBC AUTO: 48.2 FL (ref 37–54)
EGFRCR SERPLBLD CKD-EPI 2021: 39.4 ML/MIN/1.73
EOSINOPHIL # BLD MANUAL: 0.21 10*3/MM3 (ref 0–0.4)
EOSINOPHIL NFR BLD MANUAL: 2 % (ref 0.3–6.2)
ERYTHROCYTE [DISTWIDTH] IN BLOOD BY AUTOMATED COUNT: 17.6 % (ref 12.3–15.4)
FERRITIN SERPL-MCNC: 121.5 NG/ML (ref 13–150)
GLUCOSE BLDC GLUCOMTR-MCNC: 106 MG/DL (ref 70–130)
GLUCOSE BLDC GLUCOMTR-MCNC: 89 MG/DL (ref 70–130)
GLUCOSE BLDC GLUCOMTR-MCNC: 90 MG/DL (ref 70–130)
GLUCOSE BLDC GLUCOMTR-MCNC: 93 MG/DL (ref 70–130)
GLUCOSE SERPL-MCNC: 88 MG/DL (ref 65–99)
HCT VFR BLD AUTO: 34 % (ref 34–46.6)
HGB BLD-MCNC: 10.4 G/DL (ref 12–15.9)
IRON 24H UR-MRATE: 17 MCG/DL (ref 37–145)
IRON SATN MFR SERPL: 5 % (ref 20–50)
LYMPHOCYTES # BLD MANUAL: 2.44 10*3/MM3 (ref 0.7–3.1)
LYMPHOCYTES NFR BLD MANUAL: 4.1 % (ref 5–12)
MCH RBC QN AUTO: 23.2 PG (ref 26.6–33)
MCHC RBC AUTO-ENTMCNC: 30.6 G/DL (ref 31.5–35.7)
MCV RBC AUTO: 75.7 FL (ref 79–97)
MICROCYTES BLD QL: ABNORMAL
MONOCYTES # BLD: 0.43 10*3/MM3 (ref 0.1–0.9)
NEUTROPHILS # BLD AUTO: 7.32 10*3/MM3 (ref 1.7–7)
NEUTROPHILS NFR BLD MANUAL: 68.4 % (ref 42.7–76)
NEUTS BAND NFR BLD MANUAL: 2 % (ref 0–5)
PLAT MORPH BLD: NORMAL
PLATELET # BLD AUTO: 285 10*3/MM3 (ref 140–450)
PMV BLD AUTO: 10.1 FL (ref 6–12)
POTASSIUM SERPL-SCNC: 4.1 MMOL/L (ref 3.5–5.2)
RBC # BLD AUTO: 4.49 10*6/MM3 (ref 3.77–5.28)
SODIUM SERPL-SCNC: 140 MMOL/L (ref 136–145)
TIBC SERPL-MCNC: 356 MCG/DL (ref 298–536)
TRANSFERRIN SERPL-MCNC: 239 MG/DL (ref 200–360)
VARIANT LYMPHS NFR BLD MANUAL: 23.5 % (ref 19.6–45.3)
WBC MORPH BLD: NORMAL
WBC NRBC COR # BLD AUTO: 10.39 10*3/MM3 (ref 3.4–10.8)

## 2025-05-17 PROCEDURE — 25810000003 SODIUM CHLORIDE 0.9 % SOLUTION: Performed by: NURSE PRACTITIONER

## 2025-05-17 PROCEDURE — 25010000002 CEFTRIAXONE PER 250 MG: Performed by: NURSE PRACTITIONER

## 2025-05-17 PROCEDURE — 85025 COMPLETE CBC W/AUTO DIFF WBC: CPT | Performed by: NURSE PRACTITIONER

## 2025-05-17 PROCEDURE — 82728 ASSAY OF FERRITIN: CPT | Performed by: NURSE PRACTITIONER

## 2025-05-17 PROCEDURE — 80048 BASIC METABOLIC PNL TOTAL CA: CPT | Performed by: NURSE PRACTITIONER

## 2025-05-17 PROCEDURE — 83540 ASSAY OF IRON: CPT | Performed by: NURSE PRACTITIONER

## 2025-05-17 PROCEDURE — 36415 COLL VENOUS BLD VENIPUNCTURE: CPT | Performed by: NURSE PRACTITIONER

## 2025-05-17 PROCEDURE — 84466 ASSAY OF TRANSFERRIN: CPT | Performed by: NURSE PRACTITIONER

## 2025-05-17 PROCEDURE — 85007 BL SMEAR W/DIFF WBC COUNT: CPT | Performed by: NURSE PRACTITIONER

## 2025-05-17 PROCEDURE — 25010000002 NA FERRIC GLUC CPLX PER 12.5 MG: Performed by: NURSE PRACTITIONER

## 2025-05-17 PROCEDURE — 82948 REAGENT STRIP/BLOOD GLUCOSE: CPT

## 2025-05-17 RX ORDER — SODIUM CHLORIDE 9 MG/ML
75 INJECTION, SOLUTION INTRAVENOUS CONTINUOUS
Status: DISCONTINUED | OUTPATIENT
Start: 2025-05-17 | End: 2025-05-18 | Stop reason: HOSPADM

## 2025-05-17 RX ADMIN — CEFTRIAXONE SODIUM 1000 MG: 1 INJECTION, POWDER, FOR SOLUTION INTRAMUSCULAR; INTRAVENOUS at 15:12

## 2025-05-17 RX ADMIN — LAMOTRIGINE 150 MG: 100 TABLET ORAL at 09:27

## 2025-05-17 RX ADMIN — DULOXETINE 120 MG: 30 CAPSULE, DELAYED RELEASE ORAL at 09:27

## 2025-05-17 RX ADMIN — SODIUM CHLORIDE 75 ML/HR: 9 INJECTION, SOLUTION INTRAVENOUS at 15:12

## 2025-05-17 RX ADMIN — CETIRIZINE HYDROCHLORIDE 10 MG: 10 TABLET, FILM COATED ORAL at 09:27

## 2025-05-17 RX ADMIN — LAMOTRIGINE 150 MG: 100 TABLET ORAL at 22:00

## 2025-05-17 RX ADMIN — FLUTICASONE PROPIONATE 2 SPRAY: 50 SPRAY, METERED NASAL at 09:27

## 2025-05-17 RX ADMIN — SODIUM CHLORIDE 500 ML: 9 INJECTION, SOLUTION INTRAVENOUS at 09:38

## 2025-05-17 RX ADMIN — PANTOPRAZOLE SODIUM 40 MG: 40 TABLET, DELAYED RELEASE ORAL at 09:27

## 2025-05-17 RX ADMIN — SODIUM CHLORIDE 125 MG: 9 INJECTION, SOLUTION INTRAVENOUS at 15:53

## 2025-05-17 RX ADMIN — ACETAMINOPHEN 650 MG: 325 TABLET, FILM COATED ORAL at 06:49

## 2025-05-17 NOTE — PLAN OF CARE
Goal Outcome Evaluation:            Pt. A&Ox4. VSS. RA. Pt. C/o dizziness when ambulating, but dizziness spells improve when pt sits on side of bed for a while before ambulation. Adequate urine output. Non productive cough. Pt. Crt improved to 1.6 this AM. NSR on tele 63-82. Safety maintained.

## 2025-05-17 NOTE — PROGRESS NOTES
Patient Name: Maribel Whitaker  Date of Admission: 5/16/2025  Today's Date: 05/17/25  Length of Stay: 1  Primary Care Physician: Miguel Mckenzie DO    Subjective   Chief Complaint: Severe vertigo  HPI   Today: Awake and alert, continued mild disorientation, significantly improved since yesterday.  Reviewed a.m. laboratory studies, creatinine 1.6 significantly improved.  PT evaluation for BPPV.  Discussed decreased fluid intake due to dizziness/nausea causing ALIN and hypotension and will continue to hold sedating medications.  Patient verbalized understanding.  She continues to deny dysuria, difficulty urinating or other signs of UTI.  However I do have concerns that she does not truly understand condition.    Documented weights    05/16/25 1054   Weight: 93 kg (205 lb)          Intake/Output Summary (Last 24 hours) at 5/17/2025 1533  Last data filed at 5/16/2025 1802  Gross per 24 hour   Intake 240 ml   Output --   Net 240 ml          Review of Systems   All pertinent negatives and positives are as above. All other systems have been reviewed and are negative unless otherwise stated.     Objective    Temp:  [98 °F (36.7 °C)-98.4 °F (36.9 °C)] 98 °F (36.7 °C)  Heart Rate:  [58-81] 58  Resp:  [16-18] 16  BP: (114-134)/(52-93) 124/52  Physical Exam  Vitals reviewed.   Constitutional:       Appearance: Normal appearance.   HENT:      Head: Normocephalic and atraumatic.      Mouth/Throat:      Mouth: Mucous membranes are moist.      Pharynx: Oropharynx is clear.   Eyes:      Conjunctiva/sclera: Conjunctivae normal.      Comments: Disconjugate gaze   Cardiovascular:      Rate and Rhythm: Normal rate and regular rhythm.      Comments: Sinus 63-82  Pulmonary:      Effort: Pulmonary effort is normal.      Breath sounds: Normal breath sounds.   Abdominal:      General: There is no distension.      Palpations: Abdomen is soft.   Musculoskeletal:      Cervical back: Normal range of motion and neck supple.      Right  lower leg: No edema.      Left lower leg: No edema.   Skin:     General: Skin is warm and dry.   Neurological:      General: No focal deficit present.      Mental Status: She is alert.      Comments: Mild intermittent disorientation.  Suspect patient has underlying disorder such as Asperger's or mild autism.  Baseline behavior unknown.   Psychiatric:         Mood and Affect: Mood normal.         Behavior: Behavior normal.       Results Review:  I have reviewed the labs, radiology results, and diagnostic studies.    Laboratory Data:   Results from last 7 days   Lab Units 05/17/25  0441 05/16/25  1109   WBC 10*3/mm3 10.39 13.70*   HEMOGLOBIN g/dL 10.4* 11.0*   HEMATOCRIT % 34.0 34.8   PLATELETS 10*3/mm3 285 424        Results from last 7 days   Lab Units 05/17/25  0441 05/16/25  1109   SODIUM mmol/L 140 139   POTASSIUM mmol/L 4.1 4.2   CHLORIDE mmol/L 104 99   CO2 mmol/L 23.0 23.0   BUN mg/dL 27* 42*   CREATININE mg/dL 1.60* 3.83*   CALCIUM mg/dL 8.8 9.3   BILIRUBIN mg/dL  --  0.4   ALK PHOS U/L  --  112   ALT (SGPT) U/L  --  14   AST (SGOT) U/L  --  19   GLUCOSE mg/dL 88 110*       Culture Data:     Microbiology Results (last 10 days)       Procedure Component Value - Date/Time    Blood Culture - Blood, Arm, Right [[id number]]  (Abnormal) Collected: 05/16/25 1445    Lab Status: Preliminary result Specimen: Blood from Arm, Right Updated: 05/17/25 1359     Blood Culture Abnormal Stain     Gram Stain Aerobic Bottle Gram positive cocci    Blood Culture ID, PCR - Blood, Arm, Right [[id number]]  (Abnormal) Collected: 05/16/25 1445    Lab Status: Final result Specimen: Blood from Arm, Right Updated: 05/17/25 1523     BCID, PCR Staph spp, not aureus or lugdunensis. Identification by BCID2 PCR.     BOTTLE TYPE Aerobic Bottle    Blood Culture - Blood, Hand, Left [[id number]]  (Normal) Collected: 05/16/25 1440    Lab Status: Preliminary result Specimen: Blood from Hand, Left Updated: 05/17/25 1500     Blood Culture No growth at 24  hours    Urine Culture - Urine, Straight Cath [[id number]]  (Normal) Collected: 05/16/25 1316    Lab Status: Preliminary result Specimen: Urine from Straight Cath Updated: 05/17/25 1308     Urine Culture No growth    COVID PRE-OP / PRE-PROCEDURE SCREENING ORDER (NO ISOLATION) - Swab, Nasopharynx [[id number]]  (Normal) Collected: 05/16/25 1149    Lab Status: Final result Specimen: Swab from Nasopharynx Updated: 05/16/25 1218    Narrative:      The following orders were created for panel order COVID PRE-OP / PRE-PROCEDURE SCREENING ORDER (NO ISOLATION) - Swab, Nasopharynx.  Procedure                               Abnormality         Status                     ---------                               -----------         ------                     COVID-19 and FLU A/B PCR...[[id number]]  Normal              Final result                 Please view results for these tests on the individual orders.    COVID-19 and FLU A/B PCR, 1 HR TAT - Swab, Nasopharynx [[id number]]  (Normal) Collected: 05/16/25 1149    Lab Status: Final result Specimen: Swab from Nasopharynx Updated: 05/16/25 1218     COVID19 Not Detected     Influenza A PCR Not Detected     Influenza B PCR Not Detected    Narrative:      Fact sheet for providers: https://www.fda.gov/media/888465/download    Fact sheet for patients: https://www.fda.gov/media/650359/download    Test performed by PCR.    Rapid Strep A Screen - Swab, Throat [[id number]]  (Normal) Collected: 05/16/25 1149    Lab Status: Final result Specimen: Swab from Throat Updated: 05/16/25 1209     Strep A Ag Negative    Beta Strep Culture, Throat - Swab, Throat [[id number]]  (Normal) Collected: 05/16/25 1149    Lab Status: Preliminary result Specimen: Swab from Throat Updated: 05/17/25 0951     Throat Culture, Beta Strep No Beta Hemolytic Streptococcus Isolated    Narrative:      Group A Strep incidence is low in adults. Positive culture for Beta hemolytic Streptococcus species can reflect colonization and  not true infection. Please correlate clinically.             Radiology Data:   Imaging Results (Last 7 Days)       Procedure Component Value Units Date/Time    CT Head Without Contrast [[id number]] Collected: 05/16/25 1259     Updated: 05/16/25 1305    Narrative:      Exam: CT HEAD WO CONTRAST- 5/16/2025 11:45 AM     HISTORY: Family reports confusion       DOSE LENGTH PRODUCT: 852.72 mGy.cm mGy cm. Automated exposure control  was also utilized to decrease patient radiation dose.     Technique:  Helically acquired CT of the brain without IV contrast was performed.  Sagittal and coronal reformations are also provided for review. Soft  tissue and bone kernels are available for interpretation.     Comparison: 9/19/2024.     Findings:     Ventricles and extra-axial CSF spaces are normal in size.     No intraparenchymal or extra-axial hemorrhage.     Gray-white matter differentiation is preserved.     Orbits are grossly unremarkable. Air-fluid levels within the bilateral  frontal, sphenoid, and maxillary sinuses with subtotal opacification of  the ethmoid air cells. Mastoid air cells are grossly clear.     No suspicious calvarial or extracranial soft tissue abnormality.       Impression:      Impression:       No acute intracranial abnormality.     Air-fluid levels and opacification of the paranasal sinuses, correlate  for acute sinusitis.     This report was signed and finalized on 5/16/2025 1:02 PM by James Grover.       XR Chest 1 View [[id number]] Collected: 05/16/25 1204     Updated: 05/16/25 1209    Narrative:      EXAMINATION: XR CHEST 1 VW-     5/16/2025 11:02 AM     HISTORY: Cough     A frontal projection of the chest is compared with the previous study  dated 12/20/2024.     The lungs are moderately well-expanded.     There is a poorly defined nodule projected over the left hilum which  probably represent a vessel. A follow-up examination with particular  attention to this area may be obtained.     Moderate  elevation right diaphragm appears more pronounced due to  lordotic projection.     There is no evidence of recent infiltrate, pleural effusion, pulmonary  congestion or pneumothorax.     The heart size in the normal range.     No acute bony abnormality.       Impression:      1. A small ill-defined rounded density/nodule projecting over the left  hilum behind the cardiac silhouette probably represent a vessel. A  follow-up examination in PA projection may be obtained for further  evaluation of this area.  2. No active cardiopulmonary disease.              This report was signed and finalized on 5/16/2025 12:06 PM by Dr. Vance Adams MD.                I have reviewed the patient's current medications.     cefTRIAXone, 1,000 mg, Intravenous, Q24H  cetirizine, 10 mg, Oral, Daily  DULoxetine, 120 mg, Oral, Daily  ferric gluconate, 125 mg, Intravenous, Daily  fluticasone, 2 spray, Each Nare, Daily  [Held by provider] gabapentin, 300 mg, Oral, Q6H  insulin lispro, 2-7 Units, Subcutaneous, 4x Daily AC & at Bedtime  lamoTRIgine, 150 mg, Oral, BID  pantoprazole, 40 mg, Oral, Daily  [Held by provider] traMADol, 50 mg, Oral, Q12H            Assessment/Plan   Assessment  Active Hospital Problems    Diagnosis     **Acute renal failure     Acute sinusitis     Acute cystitis with hematuria     Metabolic encephalopathy     Vertigo        Treatment Plan  1.  Metabolic encephalopathy; neurochecks, hold home sedating medication     2.  Acute renal failure; normal saline 75 mL/hour, BMP in a.m.     3.  Acute cystitis with hematuria/acute sinusitis; positive blood culture x 1-suspect contamination however will continue Rocephin  g IV day 2, await further information, continue Flonase and home Zyrtec     4.  Vertigo; consult PT for BPPV evaluation, hold Antivert due to sedating side effect    5.  Sepsis with acute organ dysfunction; patient received fluid bolus in the emergency department, labs in a.m., antibiotics started    Home  medications reviewed and restarted as appropriate  DVT prophylaxis with SCDs     Medical Decision Making  Number and Complexity of problems: 5  Metabolic encephalopathy; acute, high complexity, significant improvement  Acute cystitis with hematuria; acute, high complexity, unchanged  Acute sinusitis; acute, moderate complexity, unchanged  Sepsis; felt less likely due to normal laboratory studies.  It is felt patient's hypotension and ALIN secondary to dehydration from decreased fluid intake secondary to dizziness-removed from list  Vertigo; acute chronic, high complexity, unchanged  Differential Diagnosis: None     Conditions and Status        Condition is unchanged.     [facility] Data  External documents reviewed: Epic record  Cardiac tracing (EKG, telemetry) interpretation: Reviewed  Radiology interpretation: Reviewed  Labs reviewed: Yes  Any tests that were considered but not ordered: No     Decision rules/scores evaluated (example [id number], [place], etc): No     Discussed with: Patient and Dr. Cosby     Care Planning  Shared decision making: Patient and Dr. Cosby  Code status and discussions: Full  Surrogate Decision Maker     Disposition  Social Determinants of Health that impact treatment or disposition: None  I expect the patient to be discharged to home tomorrow.    Electronically signed by SUNI Maojr, 05/17/25, 15:33 CDT.

## 2025-05-17 NOTE — PLAN OF CARE
Goal Outcome Evaluation:  Plan of Care Reviewed With: patient, spouse        Progress: improving  Outcome Evaluation: AOx4, VSS. Orthos neg. RA. SB/SR 53-83. Mild headache, prn tylenol. Blood cx positive. Cont. IV abx. Monitor renal function. IV fluids.  at bedside. Safety maintained.

## 2025-05-18 ENCOUNTER — READMISSION MANAGEMENT (OUTPATIENT)
Dept: CALL CENTER | Facility: HOSPITAL | Age: 50
End: 2025-05-18
Payer: COMMERCIAL

## 2025-05-18 VITALS
BODY MASS INDEX: 37.73 KG/M2 | OXYGEN SATURATION: 91 % | DIASTOLIC BLOOD PRESSURE: 69 MMHG | WEIGHT: 205 LBS | RESPIRATION RATE: 18 BRPM | HEART RATE: 62 BPM | TEMPERATURE: 98 F | HEIGHT: 62 IN | SYSTOLIC BLOOD PRESSURE: 144 MMHG

## 2025-05-18 LAB
ANION GAP SERPL CALCULATED.3IONS-SCNC: 12 MMOL/L (ref 5–15)
BACTERIA SPEC AEROBE CULT: ABNORMAL
BACTERIA SPEC AEROBE CULT: NO GROWTH
BACTERIA SPEC AEROBE CULT: NORMAL
BUN SERPL-MCNC: 16 MG/DL (ref 6–20)
BUN/CREAT SERPL: 20.5 (ref 7–25)
CALCIUM SPEC-SCNC: 8.6 MG/DL (ref 8.6–10.5)
CHLORIDE SERPL-SCNC: 105 MMOL/L (ref 98–107)
CO2 SERPL-SCNC: 25 MMOL/L (ref 22–29)
CREAT SERPL-MCNC: 0.78 MG/DL (ref 0.57–1)
EGFRCR SERPLBLD CKD-EPI 2021: 93.2 ML/MIN/1.73
GLUCOSE BLDC GLUCOMTR-MCNC: 76 MG/DL (ref 70–130)
GLUCOSE SERPL-MCNC: 83 MG/DL (ref 65–99)
GRAM STN SPEC: ABNORMAL
ISOLATED FROM: ABNORMAL
POTASSIUM SERPL-SCNC: 3.9 MMOL/L (ref 3.5–5.2)
SODIUM SERPL-SCNC: 142 MMOL/L (ref 136–145)

## 2025-05-18 PROCEDURE — 25010000002 NA FERRIC GLUC CPLX PER 12.5 MG: Performed by: NURSE PRACTITIONER

## 2025-05-18 PROCEDURE — 82948 REAGENT STRIP/BLOOD GLUCOSE: CPT

## 2025-05-18 PROCEDURE — 80048 BASIC METABOLIC PNL TOTAL CA: CPT | Performed by: NURSE PRACTITIONER

## 2025-05-18 RX ORDER — MECLIZINE HCL 12.5 MG 12.5 MG/1
12.5 TABLET ORAL 3 TIMES DAILY
Qty: 30 TABLET | Refills: 0 | Status: SHIPPED | OUTPATIENT
Start: 2025-05-18 | End: 2025-05-18

## 2025-05-18 RX ORDER — FLUTICASONE PROPIONATE 50 MCG
2 SPRAY, SUSPENSION (ML) NASAL DAILY
Start: 2025-05-18

## 2025-05-18 RX ORDER — MECLIZINE HCL 12.5 MG 12.5 MG/1
12.5 TABLET ORAL 3 TIMES DAILY
Qty: 30 TABLET | Refills: 0 | Status: SHIPPED | OUTPATIENT
Start: 2025-05-18

## 2025-05-18 RX ADMIN — SODIUM CHLORIDE 125 MG: 9 INJECTION, SOLUTION INTRAVENOUS at 08:44

## 2025-05-18 RX ADMIN — CETIRIZINE HYDROCHLORIDE 10 MG: 10 TABLET, FILM COATED ORAL at 08:45

## 2025-05-18 RX ADMIN — FLUTICASONE PROPIONATE 2 SPRAY: 50 SPRAY, METERED NASAL at 08:45

## 2025-05-18 RX ADMIN — LAMOTRIGINE 150 MG: 100 TABLET ORAL at 08:44

## 2025-05-18 RX ADMIN — Medication 5 MG: at 00:00

## 2025-05-18 RX ADMIN — PANTOPRAZOLE SODIUM 40 MG: 40 TABLET, DELAYED RELEASE ORAL at 08:44

## 2025-05-18 RX ADMIN — DULOXETINE 120 MG: 30 CAPSULE, DELAYED RELEASE ORAL at 08:44

## 2025-05-18 NOTE — DISCHARGE SUMMARY
[facility]  DISCHARGE SUMMARY       Date of Admission: 5/16/2025  Date of Discharge:  5/18/2025  Primary Care Physician: Miguel Mckenzie DO    Presenting Problem/History of Present Illness:  Severe vertigo, confusion    Final Discharge Diagnoses:  Active Hospital Problems    Diagnosis     **Acute renal failure     Acute sinusitis     Acute cystitis with hematuria     Metabolic encephalopathy     Vertigo        Consults: None    Procedures Performed: None    Pertinent Test Results:       Imaging Results (All)       Procedure Component Value Units Date/Time    CT Head Without Contrast [[id number]] Collected: 05/16/25 1259     Updated: 05/16/25 1305    Narrative:      Exam: CT HEAD WO CONTRAST- 5/16/2025 11:45 AM     HISTORY: Family reports confusion       DOSE LENGTH PRODUCT: 852.72 mGy.cm mGy cm. Automated exposure control  was also utilized to decrease patient radiation dose.     Technique:  Helically acquired CT of the brain without IV contrast was performed.  Sagittal and coronal reformations are also provided for review. Soft  tissue and bone kernels are available for interpretation.     Comparison: 9/19/2024.     Findings:     Ventricles and extra-axial CSF spaces are normal in size.     No intraparenchymal or extra-axial hemorrhage.     Gray-white matter differentiation is preserved.     Orbits are grossly unremarkable. Air-fluid levels within the bilateral  frontal, sphenoid, and maxillary sinuses with subtotal opacification of  the ethmoid air cells. Mastoid air cells are grossly clear.     No suspicious calvarial or extracranial soft tissue abnormality.       Impression:      Impression:       No acute intracranial abnormality.     Air-fluid levels and opacification of the paranasal sinuses, correlate  for acute sinusitis.     This report was signed and finalized on 5/16/2025 1:02 PM by James Grover.       XR Chest 1 View [[id number]] Collected:  05/16/25 1204     Updated: 05/16/25 1209    Narrative:      EXAMINATION: XR CHEST 1 VW-     5/16/2025 11:02 AM     HISTORY: Cough     A frontal projection of the chest is compared with the previous study  dated 12/20/2024.     The lungs are moderately well-expanded.     There is a poorly defined nodule projected over the left hilum which  probably represent a vessel. A follow-up examination with particular  attention to this area may be obtained.     Moderate elevation right diaphragm appears more pronounced due to  lordotic projection.     There is no evidence of recent infiltrate, pleural effusion, pulmonary  congestion or pneumothorax.     The heart size in the normal range.     No acute bony abnormality.       Impression:      1. A small ill-defined rounded density/nodule projecting over the left  hilum behind the cardiac silhouette probably represent a vessel. A  follow-up examination in PA projection may be obtained for further  evaluation of this area.  2. No active cardiopulmonary disease.              This report was signed and finalized on 5/16/2025 12:06 PM by Dr. Vance Adams MD.             LAB RESULTS:      Lab 05/17/25  0441 05/16/25  1440 05/16/25  1109   WBC 10.39  --  13.70*   HEMOGLOBIN 10.4*  --  11.0*   HEMATOCRIT 34.0  --  34.8   PLATELETS 285  --  424   NEUTROS ABS 7.32*  --  10.74*  11.43*   IMMATURE GRANS (ABS)  --   --  0.21*   LYMPHS ABS  --   --  1.26   MONOS ABS  --   --  1.29*   EOS ABS 0.21  --  0.10  0.05   MCV 75.7*  --  73.3*   PROCALCITONIN  --  0.25  --    LACTATE  --  0.8  --          Lab 05/18/25  0500 05/17/25  0441 05/16/25  1109   SODIUM 142 140 139   POTASSIUM 3.9 4.1 4.2   CHLORIDE 105 104 99   CO2 25.0 23.0 23.0   ANION GAP 12.0 13.0 17.0*   BUN 16 27* 42*   CREATININE 0.78 1.60* 3.83*   EGFR 93.2 39.4* 13.8*   GLUCOSE 83 88 110*   CALCIUM 8.6 8.8 9.3   MAGNESIUM  --   --  2.3   HEMOGLOBIN A1C  --   --  5.80*         Lab 05/16/25  1109   TOTAL PROTEIN 7.6   ALBUMIN  3.9   GLOBULIN 3.7   ALT (SGPT) 14   AST (SGOT) 19   BILIRUBIN 0.4   ALK PHOS 112                 Lab 05/17/25  0441   IRON 17*   IRON SATURATION (TSAT) 5*   TIBC 356   TRANSFERRIN 239   FERRITIN 121.50         Brief Urine Lab Results  (Last result in the past 365 days)        Color   Clarity   Blood   Leuk Est   Nitrite   Protein   CREAT   Urine HCG        05/16/25 1316 Dark Yellow   Turbid   Negative   Trace   Negative   100 mg/dL (2+)                 Microbiology Results (last 10 days)       Procedure Component Value - Date/Time    Blood Culture - Blood, Arm, Right [[id number]]  (Abnormal) Collected: 05/16/25 1445    Lab Status: Final result Specimen: Blood from Arm, Right Updated: 05/18/25 0523     Blood Culture Staphylococcus, coagulase negative     Isolated from Aerobic Bottle     Gram Stain Aerobic Bottle Gram positive cocci    Narrative:      Probable contaminant requires clinical correlation, susceptibility not performed unless requested by physician.      Blood Culture ID, PCR - Blood, Arm, Right [[id number]]  (Abnormal) Collected: 05/16/25 1445    Lab Status: Final result Specimen: Blood from Arm, Right Updated: 05/17/25 1523     BCID, PCR Staph spp, not aureus or lugdunensis. Identification by BCID2 PCR.     BOTTLE TYPE Aerobic Bottle    Blood Culture - Blood, Hand, Left [[id number]]  (Normal) Collected: 05/16/25 1440    Lab Status: Preliminary result Specimen: Blood from Hand, Left Updated: 05/17/25 1500     Blood Culture No growth at 24 hours    Urine Culture - Urine, Straight Cath [[id number]]  (Normal) Collected: 05/16/25 1316    Lab Status: Preliminary result Specimen: Urine from Straight Cath Updated: 05/17/25 1308     Urine Culture No growth    COVID PRE-OP / PRE-PROCEDURE SCREENING ORDER (NO ISOLATION) - Swab, Nasopharynx [[id number]]  (Normal) Collected: 05/16/25 1149    Lab Status: Final result Specimen: Swab from Nasopharynx Updated: 05/16/25 1218    Narrative:      The following orders were  created for panel order COVID PRE-OP / PRE-PROCEDURE SCREENING ORDER (NO ISOLATION) - Swab, Nasopharynx.  Procedure                               Abnormality         Status                     ---------                               -----------         ------                     COVID-19 and FLU A/B PCR...[[id number]]  Normal              Final result                 Please view results for these tests on the individual orders.    COVID-19 and FLU A/B PCR, 1 HR TAT - Swab, Nasopharynx [[id number]]  (Normal) Collected: 05/16/25 1149    Lab Status: Final result Specimen: Swab from Nasopharynx Updated: 05/16/25 1218     COVID19 Not Detected     Influenza A PCR Not Detected     Influenza B PCR Not Detected    Narrative:      Fact sheet for providers: https://www.fda.gov/media/086294/download    Fact sheet for patients: https://www.fda.gov/media/051309/download    Test performed by PCR.    Rapid Strep A Screen - Swab, Throat [[id number]]  (Normal) Collected: 05/16/25 1149    Lab Status: Final result Specimen: Swab from Throat Updated: 05/16/25 1209     Strep A Ag Negative    Beta Strep Culture, Throat - Swab, Throat [[id number]]  (Normal) Collected: 05/16/25 1149    Lab Status: Preliminary result Specimen: Swab from Throat Updated: 05/17/25 0951     Throat Culture, Beta Strep No Beta Hemolytic Streptococcus Isolated    Narrative:      Group A Strep incidence is low in adults. Positive culture for Beta hemolytic Streptococcus species can reflect colonization and not true infection. Please correlate clinically.          Microbiology Results (last 10 days)       Procedure Component Value - Date/Time    Blood Culture - Blood, Arm, Right [[id number]]  (Abnormal) Collected: 05/16/25 1445    Lab Status: Final result Specimen: Blood from Arm, Right Updated: 05/18/25 0523     Blood Culture Staphylococcus, coagulase negative     Isolated from Aerobic Bottle     Gram Stain Aerobic Bottle Gram positive cocci    Narrative:      Probable  contaminant requires clinical correlation, susceptibility not performed unless requested by physician.      Blood Culture ID, PCR - Blood, Arm, Right [[id number]]  (Abnormal) Collected: 05/16/25 1445    Lab Status: Final result Specimen: Blood from Arm, Right Updated: 05/17/25 1523     BCID, PCR Staph spp, not aureus or lugdunensis. Identification by BCID2 PCR.     BOTTLE TYPE Aerobic Bottle    Blood Culture - Blood, Hand, Left [[id number]]  (Normal) Collected: 05/16/25 1440    Lab Status: Preliminary result Specimen: Blood from Hand, Left Updated: 05/17/25 1500     Blood Culture No growth at 24 hours    Urine Culture - Urine, Straight Cath [[id number]]  (Normal) Collected: 05/16/25 1316    Lab Status: Preliminary result Specimen: Urine from Straight Cath Updated: 05/17/25 1308     Urine Culture No growth    COVID PRE-OP / PRE-PROCEDURE SCREENING ORDER (NO ISOLATION) - Swab, Nasopharynx [[id number]]  (Normal) Collected: 05/16/25 1149    Lab Status: Final result Specimen: Swab from Nasopharynx Updated: 05/16/25 1218    Narrative:      The following orders were created for panel order COVID PRE-OP / PRE-PROCEDURE SCREENING ORDER (NO ISOLATION) - Swab, Nasopharynx.  Procedure                               Abnormality         Status                     ---------                               -----------         ------                     COVID-19 and FLU A/B PCR...[[id number]]  Normal              Final result                 Please view results for these tests on the individual orders.    COVID-19 and FLU A/B PCR, 1 HR TAT - Swab, Nasopharynx [[id number]]  (Normal) Collected: 05/16/25 1149    Lab Status: Final result Specimen: Swab from Nasopharynx Updated: 05/16/25 1218     COVID19 Not Detected     Influenza A PCR Not Detected     Influenza B PCR Not Detected    Narrative:      Fact sheet for providers: https://www.fda.gov/media/634729/download    Fact sheet for patients: https://www.fda.gov/media/319514/download    Test  performed by PCR.    Rapid Strep A Screen - Swab, Throat [[id number]]  (Normal) Collected: 05/16/25 1149    Lab Status: Final result Specimen: Swab from Throat Updated: 05/16/25 1209     Strep A Ag Negative    Beta Strep Culture, Throat - Swab, Throat [[id number]]  (Normal) Collected: 05/16/25 1149    Lab Status: Preliminary result Specimen: Swab from Throat Updated: 05/17/25 0951     Throat Culture, Beta Strep No Beta Hemolytic Streptococcus Isolated    Narrative:      Group A Strep incidence is low in adults. Positive culture for Beta hemolytic Streptococcus species can reflect colonization and not true infection. Please correlate clinically.             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  Patient presented to urgent care with complaints of severe vertigo.  She reported her PCP has been following and was supposed to start physical therapy however over the past 3 days vertigo has worsened to the point she is unable to walk.  She presented to urgent care via wheelchair.   reported poor memory, difficulty speaking, falling asleep quickly.  Patient reported blurred vision.  It is documented patient is unkept with clothes worn inside out when she presented to urgent care.  With concerns for CVA she was transported to ED for evaluation.  ED workup revealed symptoms of sepsis, ALIN with a creatinine of 3.83, she does have a bandemia of 12%, UTI, acute sinusitis.  At time of my assessment patient did not arouse with sternal rub.  With continued stimuli sternal rub, patting upper extremities she finally awakened.  She says she was at work today.  I have no way of verifyin.  From documentation it appears she has been too sick to work over the past 3 days.   is not  "available at this time.  She is oriented x 3 after awakening.  Nurse did report she had an episode of nonresponsiveness earlier with a drop in oxygen saturation prompting addition of oxygen 2 L nasal cannula.  She denies pain.  She does repeat the same questions.  She has been taking Dramamine and I feel this is playing a part in her somnolence/metabolic encephalopathy due to ALIN.  Patient also takes multiple sedating home medications.  We will hold those for now.  She is admitted for further evaluation and treatment.     Today: Patient is alert and oriented.  She is able to answer all questions appropriately.  She has had no dizziness while walking to the bathroom.  She has no pain or complaints.  She is agreeable to discharge.      It is felt sepsis less likely as initial presentation secondary to ALIN and use of multiple sedating medications.  UTI noted on routine urinalysis however she has had no urinary symptoms of infection.  She did receive 2 days therapy of Rocephin.  Blood culture positive for Staphylococcus coagulase, felt to be contaminant. Initial workup did reveal acute sinusitis which could also be causing dizziness.  Still complaining of \"popping\" in the right ear.  We will continue to treat with Augmentin x 3 more days, Flonase and Zyrtec.  She will need to follow-up with her primary care provider next week.  She verbalizes understanding that she will have to call for an appointment as soon as possible.  She states she will call for outpatient physical therapy evaluation as directed by PCP.  Patient PT consulted for BPPV evaluation unfortunately this was not completed.  Patient does have iron deficiency and was treated with IV replacement.  This should also improve fatigability.  I did explain she would need to hold sedating medications until seen by Dr. Mckenzie, drink at least 32 ounces of water per day to prevent recurrence of kidney failure.  She verbalizes understanding however I am unsure she will " "follow through as directed.  She is stable for discharge    Patient has reached the maximum benefit of hospitalization and is stable for discharge  Patient has been evaluated today 05/18/25 and is stable for discharge.   Physical Exam on Discharge:  /69 (BP Location: Right arm, Patient Position: Sitting)   Pulse 62   Temp 98 °F (36.7 °C) (Oral)   Resp 18   Ht 157.5 cm (62\")   Wt 93 kg (205 lb)   LMP  (LMP Unknown)   SpO2 91%   BMI 37.49 kg/m²   Physical Exam  Vitals reviewed.   Constitutional:       Appearance: Normal appearance.   HENT:      Head: Normocephalic and atraumatic.      Mouth/Throat:      Mouth: Mucous membranes are moist.      Pharynx: Oropharynx is clear.   Eyes:      Conjunctiva/sclera: Conjunctivae normal.      Comments: disconjugate gaze   Cardiovascular:      Rate and Rhythm: Normal rate and regular rhythm.   Pulmonary:      Effort: Pulmonary effort is normal.      Breath sounds: Normal breath sounds.   Abdominal:      General: There is no distension.      Palpations: Abdomen is soft.   Musculoskeletal:      Cervical back: Normal range of motion and neck supple.      Right lower leg: No edema.      Left lower leg: No edema.   Skin:     General: Skin is warm and dry.   Neurological:      General: No focal deficit present.      Mental Status: She is alert and oriented to person, place, and time.   Psychiatric:         Mood and Affect: Mood normal.         Behavior: Behavior normal.       Condition on Discharge: Stable    Discharge Disposition:  Home or Self Care    Discharge Medications:     Discharge Medications        PAUSE taking these medications        Instructions Start Date   baclofen 10 MG tablet  Wait to take this until your doctor or other care provider tells you to start again.  Commonly known as: LIORESAL  What changed: reasons to take this   10 mg, Oral, 3 Times Daily PRN      gabapentin 300 MG capsule  Wait to take this until your doctor or other care provider tells you " to start again.  Commonly known as: NEURONTIN   300 mg, Oral, Every 6 Hours      traZODone 150 MG tablet  Wait to take this until your doctor or other care provider tells you to start again.  Commonly known as: DESYREL   150 mg, Oral, Nightly             New Medications        Instructions Start Date   amoxicillin-clavulanate 875-125 MG per tablet  Commonly known as: AUGMENTIN   1 tablet, Oral, 2 Times Daily      fluticasone 50 MCG/ACT nasal spray  Commonly known as: FLONASE   2 sprays, Each Nare, Daily      meclizine 12.5 MG tablet  Commonly known as: ANTIVERT   12.5 mg, Oral, 3 times daily             Continue These Medications        Instructions Start Date   Accu-Chek Guide w/Device kit   Use as directed      cetirizine 10 MG tablet  Commonly known as: zyrTEC   10 mg, Oral, Daily      DULoxetine 60 MG capsule  Commonly known as: Cymbalta   120 mg, Oral, Daily      glucose blood test strip   Use as instructed twice daily      Accu-Chek Guide Test test strip  Generic drug: glucose blood   Use as instructed twice daily to test blood sugar      IRON PO   2 tablets, Oral, Daily, Allergic to iron in tablet form but gummies are fine       lamoTRIgine 150 MG tablet  Commonly known as: LaMICtal   150 mg, Oral, 2 Times Daily      lisinopril 20 MG tablet  Commonly known as: PRINIVIL,ZESTRIL   20 mg, Oral, Daily      metFORMIN 500 MG tablet  Commonly known as: GLUCOPHAGE   500 mg, Oral, Daily With Breakfast      OneTouch Delica Plus [id number] misc   Use as instructed twice daily      Accu-Chek FastClix Lancets misc   Use 1 each twice daily      pantoprazole 40 MG EC tablet  Commonly known as: PROTONIX   40 mg, Oral, Daily      Vitamin D 50 MCG (2000 UT) tablet   2,000 Units, Oral, Daily               Discharge Diet:   Diet Instructions       Diet: Diabetic Diets, Cardiac Diets; Healthy Heart (2-3 Na+); Regular (IDDSI 7); Thin (IDDSI 0); Consistent Carbohydrate      Discharge Diet:  Diabetic Diets  Cardiac Diets        Cardiac Diet: Healthy Heart (2-3 Na+)    Texture: Regular (IDDSI 7)    Fluid Consistency: Thin (IDDSI 0)    Diabetic Diet: Consistent Carbohydrate            Activity at Discharge:   Activity Instructions       Activity as Tolerated      Work Restrictions      Provide patient with work excuse 5/16 - 5/18, 2025    Type of Restriction: Work    May Return to Work: Immediately    With / Without Restrictions: Without Restrictions            Discharge Instructions:   1.  Patient to call Dr. Velasco for appointment next week; discuss possible referral to neurology if dizziness persists despite treatment  2.  New medications:              - Acute sinusitis-amoxicillin clavulanic today 1 tablet twice daily for 3 days,    fluticasone nasal spray 2 sprays each nare daily-use bottle supplied by hospital, continue home Zyrtec daily              - Meclizine 12.5 mg 3 times a day for dizziness-review effectiveness with primary care provider  3.  Hold baclofen, gabapentin and trazodone until seen by primary care provider due to altered mental status  4.  Drink at least 32 ounces of water per day to prevent recurrent decreased kidney function  5.  Return to the emergency department for recurrent confusion, intractable dizziness, nausea, vomiting    Follow-up Appointments:   Future Appointments   Date Time Provider Department Center   9/29/2025  2:00 PM Miguel Mckenzie, DO [facility]       Test Results Pending at Discharge: None    Electronically signed by SUNI Major, 05/18/25, 08:08 CDT.    Time: 60 minutes.

## 2025-05-18 NOTE — OUTREACH NOTE
Prep Survey      Flowsheet Row Responses   [facility] patient discharged from? [place]   Is LACE score < 7 ? Yes   Eligibility [facility]   Date of Admission 05/16/25   Date of Discharge 05/18/25   Discharge Disposition Home or Self Care   Discharge diagnosis *Acute renal failure   Does the patient have one of the following disease processes/diagnoses(primary or secondary)? Other   Does the patient have Home health ordered? No   Is there a DME ordered? No   Prep survey completed? Yes            SANDI VASQUEZ - Registered Nurse

## 2025-05-18 NOTE — PLAN OF CARE
Goal Outcome Evaluation:  Plan of Care Reviewed With: patient        Progress: improving    Patient rested some during night but said she couldn't because she didn't get her gabapentin and trazodone or muscle relaxer, explained to patient the doctor was holding the sedating meds for now, did get a melatonin ordered,  at bedside, patient ambulating to bathroom, vss, no acute events overnight, safety precautions maintained

## 2025-05-19 ENCOUNTER — TRANSITIONAL CARE MANAGEMENT TELEPHONE ENCOUNTER (OUTPATIENT)
Dept: CALL CENTER | Facility: HOSPITAL | Age: 50
End: 2025-05-19
Payer: COMMERCIAL

## 2025-05-19 NOTE — PAYOR COMM NOTE
"Maribel Danielle (49 y.o. Female)   [id number]  Admit 5/16   d/c 5/18  inpt stay  [facility] phone    fax          Date of Birth   1975    Social Security Number       Address   [address] [facility] [address]    Home Phone   [phone]    MRN   [id number]       [place]   Muslim    Marital Status                               Admission Date   5/16/2025    Admission Type   Emergency    Admitting Provider   Olivier Cosby MD    Attending Provider       Department, Room/Bed   [facility] 4B, 447/1       Discharge Date   5/18/2025    Discharge Disposition   Home or Self Care    Discharge Destination                                 Attending Provider: (none)   Allergies: Iron    Isolation: None   Infection: None   Code Status: Prior    Ht: 157.5 cm (62\")   Wt: 93 kg (205 lb)    Admission Cmt: None   Principal Problem: Acute renal failure [N17.9]                   Active Insurance as of 5/16/2025       Primary Coverage       Payor Plan Insurance Group Employer/Plan Group    [facility] [employer] EMPL[employer]EE [id number]       Payor Plan Address Payor Plan Phone Number Payor Plan Fax Number Effective Dates    [address] [phone]  10/1/2024 - None Entered    [place] [address]         Subscriber Name Subscriber Birth Date Member ID       MARIBEL DANIELLE 1975 [id number]                     Emergency Contacts        (Rel.) Home Phone Work Phone Mobile Phone    ShermanYola (Relative) [phone]    MAYA DANIELLE (Significant Other) [phone] -- [phone]                 History & Physical        Priscilla Krueger APRN at 05/16/25 1426       Attestation signed by Olivier Cosby MD at 05/16/25 1702    I have reviewed this documentation and agree.    I performed a substantive part of the MDM during the patient’s E/M visit. I " "personally made or   approved the documented management plan and acknowledge its risk of complications.   (Independent Interpretation) My (EKG/X-Ray/US/CT) interpretation as outlined below  (Discussion) Management/test interpretation discussed with NP  Patient came to the urgent care today because of vertigo and inability to walk.  She also had difficulty speaking, blurred vision and difficulty falls asleep quickly.  She was sent to the emergency room for further evaluation.  ER record indicates complaints of cough, congestion, runny nose and ear pain left greater than right  Impression by ER are as follows  ALIN  Dizziness  UTI  Sinusitis     I saw her in room 379 with her   I spoke to the \"fiancé\" used to be  to patient by the name Ronaldo at bedside  Patient is resting  She was readily awakened  She was able to narrate the story and collaborate with Ronaldo.  Patient reportedly was confused that started yesterday  No reported fever or chills  Felt dizzy more than usual which I do not know if it has something to do with her blood pressure continued intake of antihypertensive  Had taken Dramamine because of nausea  No vomiting  Has not complained of any urinary symptoms besides a decrease in urine output               Vitals:     05/16/25 1435   BP: 98/57   Pulse: 75   Resp:     Temp:     SpO2: 97%   BP has been as low as 75/38.  No gross focal neurologic deficit  Follows command  Okay thought process  Appropriate insight  Diminished breath sounds but adequate air exchange  S1-S2 regular rate and rhythm        She has bacteriuria, pyuria but 3-6 epithelial cells on a straight cath sample  Creatinine is 3.83 with BUN of 42  White count 13.7 with predominance of neutrophils at 78.5, hemoglobin 11  UDS negative  Head CT no acute intracranial abnormality  Chest x-ray no active cardiopulmonary disease.  Patient started on ceftriaxone and received fluid bolus.  Plan as outlined per NP below  Acute kidney injury " with baseline creatinine of 0.73 from March 2024  History of hypertension currently with hypotension        Discussed with CARMEN, Ronaldo, patient and nursing staff.  Patient will be transported to room 447 today.     Monitor blood pressure monitor renal function and recovery as well as electrolytes.     Hold off on any medications of the lower lobe further blood pressure (record indicates lisinopril)  Avoid nephrotoxic medication  Continue hydration.  Other plans as per outlined by CARMEN Wells.        CARMEN Wells asked me about sepsis physical exam.  I am not certain at this point that she is septic.  Her lactic acid is normal her procalcitonin is normal  Patient has no symptoms relating to infectious process although has a straight cath urine sample that has bacteriuria pyuria and mild hematuria.  This however also contains 3-6 squamous epithelial cells.        Electronically signed by Olivier Cosby MD, 5/16/2025, 14:51 CDT.                                      [facility]  HISTORY AND PHYSICAL    Date of Admission: 5/16/2025  Primary Care Physician: Miguel Mckenzie, DO    Subjective   Primary Historian: Patient and     Chief Complaint: Severe vertigo    History of Present Illness  Maribel Whitaker is a 49-year-old female with a past medical history of ADHD, acid reflux, anxiety/depression, diet-controlled diabetes, Ménière's disease, fibromyalgia, hypertension, migraine headaches.  Patient presented to urgent care with complaints of severe vertigo.  She reported her PCP has been following and was supposed to start physical therapy however over the past 3 days vertigo has worsened to the point she is unable to walk.  She presented to urgent care via wheelchair.  Been reported poor memory, difficulty speaking, falling asleep quickly.  Patient reported blurred vision.  It is documented patient is uncapped with close inside out.  With concerns for CVA she was  transported to ED for evaluation.  ED workup revealed sepsis, ALIN with a creatinine of 3.83, she does have a bandemia of 12%, UTI, acute sinusitis.  At time of my assessment patient did not arouse with sternal rub.  With continued stimuli sternal rub, patting upper extremities she finally awakened.  She says she was at work today.  I have no way of verifying that.  From documentation it appears she has been too sick to work over the past 3 days.   is not available at this time.  She is oriented x 3 after awakening.  Nurse did report she had an episode of nonresponsiveness earlier with a drop in oxygen saturation prompting addition of oxygen 2 L nasal cannula.  She denies pain.  She does repeat the same questions.  She has been taking Dramamine and I feel this is playing a part in her somnolence/metabolic encephalopathy due to ALIN.  Patient also takes multiple sedating home medications.  We will hold those for now.  She is admitted for further evaluation and treatment.    Review of Systems   Otherwise complete ROS reviewed and negative except as mentioned in the HPI.    Past Medical History:   Past Medical History:   Diagnosis Date    Acid reflux     ADHD (attention deficit hyperactivity disorder)     Allergic     Anxiety     Arthritis     Depression     Diabetes mellitus     Pre diabetic    Dizziness 6 years ago    Due to Menieres disease    Fibromyalgia     Fibromyalgia, primary     HL (hearing loss)     Meniere's disease    Hypertension     Low back pain     Migraines     Neck ache     Tinnitus 6 years ago    Due to Menieres diease     Past Surgical History:  Past Surgical History:   Procedure Laterality Date     SECTION      CHOLECYSTECTOMY      GASTRIC SLEEVE LAPAROSCOPIC  2019    Dr. Yi     Social History:  reports that she has never smoked. She has never been exposed to tobacco smoke. She has never used smokeless tobacco. She reports that she does not drink alcohol and does not  use drugs.    Family History: family history includes Anxiety disorder in her mother; Breast cancer (age of onset: 65) in her maternal aunt; Cancer in her mother; Diabetes in her father and mother; Hearing loss in her father; Heart disease in her father; Hypertension in her father and mother; Kidney cancer in her mother; Obesity in her father and mother; Osteoarthritis in her mother; Osteoporosis in her maternal grandmother and mother.       Allergies:  Allergies   Allergen Reactions    Iron GI Intolerance       Medications:  Prior to Admission medications    Medication Sig Start Date End Date Taking? Authorizing Provider   Accu-Chek FastClix Lancets misc Use 1 each twice daily 5/8/25      baclofen (LIORESAL) 10 MG tablet Take 1 tablet by mouth 3 (Three) Times a Day As Needed. 3/6/25      Blood Glucose Monitoring Suppl (Accu-Chek Guide) w/Device kit Use as directed 5/8/25      cetirizine (zyrTEC) 10 MG tablet Take 1 tablet by mouth Daily.    ProviderOksana MD   Cholecalciferol (Vitamin D) 50 MCG (2000 UT) tablet Take 1 tablet by mouth Daily. 12/10/24   Alka De La Cruz APRN   DULoxetine (Cymbalta) 60 MG capsule Take 2 capsules by mouth Daily. 3/5/25      Ferrous Sulfate (IRON PO) Take 2 tablets by mouth Daily.    ProviderOksana MD   gabapentin (NEURONTIN) 300 MG capsule Take 1 capsule by mouth Every 6 (Six) Hours. 3/6/25      glucose blood (Accu-Chek Guide Test) test strip Use as instructed twice daily to test blood sugar 5/8/25      glucose blood test strip Use as instructed twice daily 3/11/24   Alka De La Cruz APRN   lamoTRIgine (LaMICtal) 150 MG tablet Take 1 tablet by mouth 2 (Two) Times a Day. 3/5/25      Lancets (OneTouch Delica Plus [id number]) misc Use as instructed twice daily 3/11/24      lisinopril (PRINIVIL,ZESTRIL) 20 MG tablet Take 1 tablet by mouth Daily. 5/5/25   Miguel Mckenzie,    metFORMIN (GLUCOPHAGE) 500 MG tablet Take 1 tablet by mouth Daily With Breakfast. 5/2/25    "Miguel Mckenzie, DO   pantoprazole (PROTONIX) 40 MG EC tablet Take 1 tablet by mouth Daily. 5/5/25   Miguel Mckenzie, DO   traMADol (ULTRAM) 50 MG tablet Take 1 tablet by mouth Every 12 (Twelve) Hours. 3/6/25      traZODone (DESYREL) 150 MG tablet Take 1/3 -1 tablet by mouth every night at bedtime as directed 3/5/25      baclofen (LIORESAL) 10 MG tablet Take 1 tablet by mouth three times daily as needed. 5/5/25 5/16/25     gabapentin (NEURONTIN) 300 MG capsule Take 1 capsule by mouth four times a day 5/14/25 5/16/25     meclizine (ANTIVERT) 25 MG tablet Take 1 tablet by mouth 2 (Two) Times a Day As Needed for Dizziness. 5/15/25 5/16/25  Miguel Mckenzie, DO     I have utilized all available immediate resources to obtain, update, or review the patient's current medications (including all prescriptions, over-the-counter products, herbals, cannabis/cannabidiol products, and vitamin/mineral/dietary (nutritional) supplements).    Objective     Vital Signs: BP 98/57   Pulse 75   Temp 98.4 °F (36.9 °C) (Oral)   Resp 16   Ht 157.5 cm (62\")   Wt 93 kg (205 lb)   LMP  (LMP Unknown)   SpO2 97%   BMI 37.49 kg/m²   Physical Exam  Vitals reviewed.   Constitutional:       Appearance: She is obese. She is ill-appearing.      Comments: Episodes of nonresponsive to stimuli   HENT:      Head: Normocephalic and atraumatic.      Mouth/Throat:      Mouth: Mucous membranes are dry.      Pharynx: Oropharynx is clear.   Eyes:      Conjunctiva/sclera: Conjunctivae normal.      Pupils: Pupils are equal, round, and reactive to light.      Comments: Disconjugate gaze   Cardiovascular:      Rate and Rhythm: Normal rate and regular rhythm.   Pulmonary:      Effort: Pulmonary effort is normal.      Breath sounds: Normal breath sounds.   Abdominal:      General: Abdomen is protuberant.      Palpations: Abdomen is soft.   Musculoskeletal:      Cervical back: Neck supple.      Right lower leg: No edema.      Left lower leg: No " "edema.   Skin:     General: Skin is warm and dry.   Neurological:      Mental Status: She is oriented to person, place, and time.      Comments: Somnolence, easily drifts off to sleep with difficulty awakening-unresponsive in nature   Psychiatric:         Mood and Affect: Mood normal. Affect is flat.          Results Reviewed:  Lab Results (last 24 hours)       Procedure Component Value Units Date/Time    Hemoglobin A1c [[id number]]  (Abnormal) Collected: 05/16/25 1109    Specimen: Blood Updated: 05/16/25 1524     Hemoglobin A1C 5.80 %     Narrative:      Hemoglobin A1C Ranges:    Increased Risk for Diabetes  5.7% to 6.4%  Diabetes                     >= 6.5%  Diabetic Goal                < 7.0%    Procalcitonin [[id number]]  (Normal) Collected: 05/16/25 1440    Specimen: Blood Updated: 05/16/25 1515     Procalcitonin 0.25 ng/mL     Narrative:      As a Marker for Sepsis (Non-Neonates):    1. <0.5 ng/mL represents a low risk of severe sepsis and/or septic shock.  2. >2 ng/mL represents a high risk of severe sepsis and/or septic shock.    As a Marker for Lower Respiratory Tract Infections that require antibiotic therapy:    PCT on Admission    Antibiotic Therapy       6-12 Hrs later    >0.5                Strongly Recommended  >0.25 - <0.5        Recommended   0.1 - 0.25          Discouraged              Remeasure/reassess PCT  <0.1                Strongly Discouraged     Remeasure/reassess PCT    As 28 day mortality risk marker: \"Change in Procalcitonin Result\" (>80% or <=80%) if Day 0 (or Day 1) and Day 4 values are available. Refer to http://www.[facility]-pct-calculator.com    Change in PCT <=80%  A decrease of PCT levels below or equal to 80% defines a positive change in PCT test result representing a higher risk for 28-day all-cause mortality of patients diagnosed with severe sepsis for septic shock.    Change in PCT >80%  A decrease of PCT levels of more than 80% defines a negative change in PCT result representing " a lower risk for 28-day all-cause mortality of patients diagnosed with severe sepsis or septic shock.       Lactic Acid, Plasma [[id number]]  (Normal) Collected: 05/16/25 1440    Specimen: Blood Updated: 05/16/25 1505     Lactate 0.8 mmol/L     Blood Culture - Blood, Arm, Right [[id number]] Collected: 05/16/25 1445    Specimen: Blood from Arm, Right Updated: 05/16/25 1459    Blood Culture - Blood, Hand, Left [[id number]] Collected: 05/16/25 1440    Specimen: Blood from Hand, Left Updated: 05/16/25 1459    Fentanyl, Urine - Straight Cath [[id number]]  (Normal) Collected: 05/16/25 1316    Specimen: Urine from Straight Cath Updated: 05/16/25 1439     Fentanyl, Urine Negative    Narrative:      Negative Threshold:      Fentanyl 5 ng/mL     The normal value for the drug tested is negative. This report includes final unconfirmed screening results to be used for medical treatment purposes only. Unconfirmed results must not be used for non-medical purposes such as employment or legal testing. Clinical consideration should be applied to any drug of abuse test, particularly when unconfirmed results are used.           Urine Drug Screen - Straight Cath [[id number]]  (Normal) Collected: 05/16/25 1316    Specimen: Urine from Straight Cath Updated: 05/16/25 1434     THC, Screen, Urine Negative     Phencyclidine (PCP), Urine Negative     Cocaine Screen, Urine Negative     Methamphetamine, Ur Negative     Opiate Screen Negative     Amphetamine Screen, Urine Negative     Benzodiazepine Screen, Urine Negative     Tricyclic Antidepressants Screen Negative     Methadone Screen, Urine Negative     Barbiturates Screen, Urine Negative     Oxycodone Screen, Urine Negative     Buprenorphine, Screen, Urine Negative    Narrative:      Cutoff For Drugs Screened:    Amphetamines               500 ng/ml  Barbiturates               200 ng/ml  Benzodiazepines            150 ng/ml  Cocaine                    150 ng/ml  Methadone                  200  ng/ml  Opiates                    100 ng/ml  Phencyclidine               25 ng/ml  THC                         50 ng/ml  Methamphetamine            500 ng/ml  Tricyclic Antidepressants  300 ng/ml  Oxycodone                  100 ng/ml  Buprenorphine               10 ng/ml    The normal value for all drugs tested is negative. This report includes unconfirmed screening results, with the cutoff values listed, to be used for medical treatment purposes only.  Unconfirmed results must not be used for non-medical purposes such as employment or legal testing.  Clinical consideration should be applied to any drug of abuse test, particularly when unconfirmed results are used.      Urinalysis With Culture If Indicated - Straight Cath [[id number]]  (Abnormal) Collected: 05/16/25 1316    Specimen: Urine from Straight Cath Updated: 05/16/25 1421     Color, UA Dark Yellow     Appearance, UA Turbid     pH, UA <=5.0     Specific Gravity, UA 1.025     Glucose, UA Negative     Ketones, UA 15 mg/dL (1+)     Bilirubin, UA Small (1+)     Blood, UA Negative     Protein,  mg/dL (2+)     Leuk Esterase, UA Trace     Nitrite, UA Negative     Urobilinogen, UA 1.0 E.U./dL    Narrative:      In absence of clinical symptoms, the presence of pyuria, bacteria, and/or nitrites on the urinalysis result does not correlate with infection.    Urinalysis, Microscopic Only - Straight Cath [[id number]]  (Abnormal) Collected: 05/16/25 1316    Specimen: Urine from Straight Cath Updated: 05/16/25 1421     RBC, UA 3-5 /HPF      WBC, UA 6-10 /HPF      Bacteria, UA 1+ /HPF      Squamous Epithelial Cells, UA 3-6 /HPF      Hyaline Casts, UA 0-2 /LPF      Granular Casts, UA 7-12 /LPF      Amorphous Crystals, UA Small/1+ /HPF      Methodology Manual Light Microscopy    Urine Culture - Urine, Straight Cath [[id number]] Collected: 05/16/25 1316    Specimen: Urine from Straight Cath Updated: 05/16/25 1420    COVID PRE-OP / PRE-PROCEDURE SCREENING ORDER (NO ISOLATION)  - Swab, Nasopharynx [[id number]]  (Normal) Collected: 05/16/25 1149    Specimen: Swab from Nasopharynx Updated: 05/16/25 1218    Narrative:      The following orders were created for panel order COVID PRE-OP / PRE-PROCEDURE SCREENING ORDER (NO ISOLATION) - Swab, Nasopharynx.  Procedure                               Abnormality         Status                     ---------                               -----------         ------                     COVID-19 and FLU A/B PCR...[[id number]]  Normal              Final result                 Please view results for these tests on the individual orders.    COVID-19 and FLU A/B PCR, 1 HR TAT - Swab, Nasopharynx [[id number]]  (Normal) Collected: 05/16/25 1149    Specimen: Swab from Nasopharynx Updated: 05/16/25 1218     COVID19 Not Detected     Influenza A PCR Not Detected     Influenza B PCR Not Detected    Narrative:      Fact sheet for providers: https://www.fda.gov/media/207872/download    Fact sheet for patients: https://www.fda.gov/media/476645/download    Test performed by PCR.    Manual Differential [[id number]]  (Abnormal) Collected: 05/16/25 1109    Specimen: Blood Updated: 05/16/25 1217     Neutrophil % 71.3 %      Lymphocyte % 4.5 %      Monocyte % 8.1 %      Eosinophil % 0.4 %      Basophil % 0.0 %      Bands %  12.1 %      Metamyelocyte % 0.4 %      Atypical Lymphocyte % 3.2 %      Neutrophils Absolute 11.43 10*3/mm3      Lymphocytes Absolute 1.05 10*3/mm3      Monocytes Absolute 1.11 10*3/mm3      Eosinophils Absolute 0.05 10*3/mm3      Basophils Absolute 0.00 10*3/mm3      Acanthocytes Slight/1+     Anisocytosis Mod/2+     Hypochromia Slight/1+     Microcytes Mod/2+     Poikilocytes Slight/1+     Polychromasia Slight/1+     WBC Morphology Normal     Clumped Platelets Present     Giant Platelets Slight/1+    CBC & Differential [[id number]]  (Abnormal) Collected: 05/16/25 1109    Specimen: Blood Updated: 05/16/25 1216    Narrative:      The following orders were  created for panel order CBC & Differential.  Procedure                               Abnormality         Status                     ---------                               -----------         ------                     CBC Auto Differential[[id number]]        Abnormal            Final result                 Please view results for these tests on the individual orders.    CBC Auto Differential [[id number]]  (Abnormal) Collected: 05/16/25 1109    Specimen: Blood Updated: 05/16/25 1216     WBC 13.70 10*3/mm3      RBC 4.75 10*6/mm3      Hemoglobin 11.0 g/dL      Hematocrit 34.8 %      MCV 73.3 fL      MCH 23.2 pg      MCHC 31.6 g/dL      RDW 17.2 %      RDW-SD 45.6 fl      MPV 10.1 fL      Platelets 424 10*3/mm3      Neutrophil % 78.5 %      Lymphocyte % 9.2 %      Monocyte % 9.4 %      Eosinophil % 0.7 %      Basophil % 0.7 %      Immature Grans % 1.5 %      Neutrophils, Absolute 10.74 10*3/mm3      Lymphocytes, Absolute 1.26 10*3/mm3      Monocytes, Absolute 1.29 10*3/mm3      Eosinophils, Absolute 0.10 10*3/mm3      Basophils, Absolute 0.10 10*3/mm3      Immature Grans, Absolute 0.21 10*3/mm3     Rapid Strep A Screen - Swab, Throat [[id number]]  (Normal) Collected: 05/16/25 1149    Specimen: Swab from Throat Updated: 05/16/25 1209     Strep A Ag Negative    Beta Strep Culture, Throat - Swab, Throat [[id number]] Collected: 05/16/25 1149    Specimen: Swab from Throat Updated: 05/16/25 1209    Comprehensive Metabolic Panel [[id number]]  (Abnormal) Collected: 05/16/25 1109    Specimen: Blood Updated: 05/16/25 1142     Glucose 110 mg/dL      BUN 42 mg/dL      Creatinine 3.83 mg/dL      Sodium 139 mmol/L      Potassium 4.2 mmol/L      Chloride 99 mmol/L      CO2 23.0 mmol/L      Calcium 9.3 mg/dL      Total Protein 7.6 g/dL      Albumin 3.9 g/dL      ALT (SGPT) 14 U/L      AST (SGOT) 19 U/L      Alkaline Phosphatase 112 U/L      Total Bilirubin 0.4 mg/dL      Globulin 3.7 gm/dL      A/G Ratio 1.1 g/dL      BUN/Creatinine Ratio  11.0     Anion Gap 17.0 mmol/L      eGFR 13.8 mL/min/1.73     Narrative:      GFR Categories in Chronic Kidney Disease (CKD)              GFR Category          GFR (mL/min/1.73)    Interpretation  G1                    90 or greater        Normal or high (1)  G2                    60-89                Mild decrease (1)  G3a                   45-59                Mild to moderate decrease  G3b                   30-44                Moderate to severe decrease  G4                    15-29                Severe decrease  G5                    14 or less           Kidney failure    (1)In the absence of evidence of kidney disease, neither GFR category G1 or G2 fulfill the criteria for CKD.    eGFR calculation 2021 CKD-EPI creatinine equation, which does not include race as a factor    Magnesium [[id number]]  (Normal) Collected: 05/16/25 1109    Specimen: Blood Updated: 05/16/25 1142     Magnesium 2.3 mg/dL     Geddes Draw [[id number]] Collected: 05/16/25 1109    Specimen: Blood Updated: 05/16/25 1115    Narrative:      The following orders were created for panel order Geddes Draw.  Procedure                               Abnormality         Status                     ---------                               -----------         ------                     Green Top (Gel)[[id number]]                                  Final result               Lavender Top[[id number]]                                     Final result               Cortes Top[[id number]]                                         Final result               Light Blue Top[[id number]]                                   Final result                 Please view results for these tests on the individual orders.    Green Top (Gel) [[id number]] Collected: 05/16/25 1109    Specimen: Blood Updated: 05/16/25 1115     Extra Tube Hold for add-ons.     Comment: Auto resulted.       Lavender Top [[id number]] Collected: 05/16/25 1109    Specimen: Blood Updated: 05/16/25 1115     Extra  Tube hold for add-on     Comment: Auto resulted       Gray Top [[id number]] Collected: 05/16/25 1109    Specimen: Blood Updated: 05/16/25 1115     Extra Tube Hold for add-ons.     Comment: Auto resulted.       Light Blue Top [[id number]] Collected: 05/16/25 1109    Specimen: Blood Updated: 05/16/25 1115     Extra Tube Hold for add-ons.     Comment: Auto resulted             Imaging Results (Last 24 Hours)       Procedure Component Value Units Date/Time    CT Head Without Contrast [[id number]] Collected: 05/16/25 1259     Updated: 05/16/25 1305    Narrative:      Exam: CT HEAD WO CONTRAST- 5/16/2025 11:45 AM     HISTORY: Family reports confusion       DOSE LENGTH PRODUCT: 852.72 mGy.cm mGy cm. Automated exposure control  was also utilized to decrease patient radiation dose.     Technique:  Helically acquired CT of the brain without IV contrast was performed.  Sagittal and coronal reformations are also provided for review. Soft  tissue and bone kernels are available for interpretation.     Comparison: 9/19/2024.     Findings:     Ventricles and extra-axial CSF spaces are normal in size.     No intraparenchymal or extra-axial hemorrhage.     Gray-white matter differentiation is preserved.     Orbits are grossly unremarkable. Air-fluid levels within the bilateral  frontal, sphenoid, and maxillary sinuses with subtotal opacification of  the ethmoid air cells. Mastoid air cells are grossly clear.     No suspicious calvarial or extracranial soft tissue abnormality.       Impression:      Impression:       No acute intracranial abnormality.     Air-fluid levels and opacification of the paranasal sinuses, correlate  for acute sinusitis.     This report was signed and finalized on 5/16/2025 1:02 PM by James Grover.       XR Chest 1 View [[id number]] Collected: 05/16/25 1204     Updated: 05/16/25 1209    Narrative:      EXAMINATION: XR CHEST 1 VW-     5/16/2025 11:02 AM     HISTORY: Cough     A frontal projection of the chest  is compared with the previous study  dated 12/20/2024.     The lungs are moderately well-expanded.     There is a poorly defined nodule projected over the left hilum which  probably represent a vessel. A follow-up examination with particular  attention to this area may be obtained.     Moderate elevation right diaphragm appears more pronounced due to  lordotic projection.     There is no evidence of recent infiltrate, pleural effusion, pulmonary  congestion or pneumothorax.     The heart size in the normal range.     No acute bony abnormality.       Impression:      1. A small ill-defined rounded density/nodule projecting over the left  hilum behind the cardiac silhouette probably represent a vessel. A  follow-up examination in PA projection may be obtained for further  evaluation of this area.  2. No active cardiopulmonary disease.              This report was signed and finalized on 5/16/2025 12:06 PM by Dr. Vance Adams MD.               Assessment / Plan   Assessment:   Active Hospital Problems    Diagnosis     **Acute renal failure     Sepsis with acute organ dysfunction     Acute sinusitis     Acute cystitis with hematuria     Metabolic encephalopathy        Treatment Plan  The patient will be admitted to Dr. Cosby's service here at [facility].     1.  Sepsis with acute organ dysfunction; patient received fluid bolus in the emergency department, labs in a.m., antibiotics started    2.  Acute renal failure; continue fluid infusion 150 mL/hour, labs in a.m.    3.  Acute cystitis with hematuria/acute sinusitis; Rocephin 2 g IV daily, Flonase, continue home Zyrtec    4.  Metabolic encephalopathy; neurochecks, hold home sedating medication    Home medications reviewed and restarted as appropriate  DVT prophylaxis with SCDs    Medical Decision Making  Number and Complexity of problems: 5  For problems, acute, high complexity, unchanged  Differential Diagnosis: None    Conditions and Status     "    Condition is unchanged.     [facility] Data  External documents reviewed: Epic record  Cardiac tracing (EKG, telemetry) interpretation: Reviewed  Radiology interpretation: Reviewed  Labs reviewed: Yes  Any tests that were considered but not ordered: No     Decision rules/scores evaluated (example [id number], Wells, etc): No     Discussed with: Patient and Dr. Cosby     Care Planning  Shared decision making: Patient and Dr. Cosby  Code status and discussions: Full    Disposition  Social Determinants of Health that impact treatment or disposition: None   Estimated length of stay is 2+ days.     I confirmed that the patient's advanced care plan is present, code status is documented, and a surrogate decision maker is listed in the patient's medical record.     The patient's surrogate decision maker is .     The patient was seen and examined by me on 5/16/2025 at 2:26 PM.    Electronically signed by KAYLYNN Major, 05/16/25, 16:51 CDT.               Electronically signed by Olivier Cosby MD at 05/16/25 1702          Emergency Department Notes        Sterling Montemayor Jr., MD at 05/16/25 1146          HPI:    Patient is a 49-year-old white female with complaint of cough congestion runny nose that has been present for a week with left greater than right ear pain.  She is also has sore throat and dizziness especially with changing positions.  She also states that her muscles in her body kind of \"hurts\".  No trauma.  No reported fever.  The patient denies any diarrhea or vomiting.  Family members also state that she seemed confused over the last 24 hours.  Speak with the patient did not see the confusion      REVIEW OF SYSTEMS      CONSTITUTIONAL: Positive for generalized fatigue and weakness   EYES:  No complaints of discharge   ENT: Positive for congestion and sore throat and ear pain   CARDIOVASCULAR:  No complaints of chest pain, palpitations, or swelling  RESPIRATORY: Positive " for nonproductive cough and shortness of breath GI:  No complaints of abdominal pain, nausea, vomiting, or diarrhea  MUSCULOSKELETAL: Positive for generalized muscular pain and fatigue SKIN:  No complaints of rash  NEUROLOGIC: Positive for reported confusion by family.    ENDOCRINE:  No complaints of polyuria or polydipsia  LYMPHATIC:  No complaints of swollen glands  GENITOURINARY: No complaints of urinary frequency or hematuria        PAST MEDICAL HISTORY  Past Medical History:   Diagnosis Date    Acid reflux     ADHD (attention deficit hyperactivity disorder)     Allergic     Anxiety     Arthritis     Depression     Diabetes mellitus 2020    Pre diabetic    Dizziness 6 years ago    Due to Menieres disease    Fibromyalgia     Fibromyalgia, primary     HL (hearing loss) 2020    Meniere's disease    Hypertension     Low back pain 2012    Migraines     Neck ache     Tinnitus 6 years ago    Due to Menieres diease       FAMILY HISTORY  Family History   Problem Relation Age of Onset    Diabetes Mother         Tyoe 2    Obesity Mother     Osteoporosis Mother     Kidney cancer Mother     Anxiety disorder Mother     Cancer Mother         Kidney cancer    Hypertension Mother     Osteoarthritis Mother     Heart disease Father     Diabetes Father         Type 2    Obesity Father     Hearing loss Father         Menieres    Hypertension Father     Breast cancer Maternal Aunt 65    Osteoporosis Maternal Grandmother     Ovarian cancer Neg Hx     Uterine cancer Neg Hx     Colon cancer Neg Hx     Melanoma Neg Hx     Colon polyps Neg Hx     Esophageal cancer Neg Hx        SOCIAL HISTORY  Social History     Socioeconomic History    Marital status:    Tobacco Use    Smoking status: Never     Passive exposure: Never    Smokeless tobacco: Never   Vaping Use    Vaping status: Never Used   Substance and Sexual Activity    Alcohol use: No    Drug use: No    Sexual activity: Yes     Partners: Male     Birth control/protection: OCP        IMMUNIZATION HISTORY  Deferred to primary care physician.    SURGICAL HISTORY  Past Surgical History:   Procedure Laterality Date     SECTION      CHOLECYSTECTOMY      GASTRIC SLEEVE LAPAROSCOPIC  2019    Dr. Yi       CURRENT MEDICATIONS    Current Facility-Administered Medications:     cefTRIAXone (ROCEPHIN) 1,000 mg in sodium chloride 0.9 % 100 mL MBP, 1,000 mg, Intravenous, Once, Sterling Montemayor Jr., MD    sodium chloride 0.9 % infusion, 250 mL/hr, Intravenous, Continuous, Sterling Montemayor Jr., MD, Last Rate: 250 mL/hr at 25 1407, 250 mL/hr at 25 1407    Current Outpatient Medications:     Accu-Chek FastClix Lancets misc, Use 1 each twice daily, Disp: 102 each, Rfl: 11    baclofen (LIORESAL) 10 MG tablet, Take 1 tablet by mouth 3 (Three) Times a Day As Needed., Disp: 90 tablet, Rfl: 1    Blood Glucose Monitoring Suppl (Accu-Chek Guide) w/Device kit, Use as directed, Disp: 1 kit, Rfl: 0    cetirizine (zyrTEC) 10 MG tablet, Take 1 tablet by mouth Daily., Disp: , Rfl:     Cholecalciferol (Vitamin D) 50 MCG (2000) tablet, Take 1 tablet by mouth Daily., Disp: 90 tablet, Rfl: 1    DULoxetine (Cymbalta) 60 MG capsule, Take 2 capsules by mouth Daily., Disp: 60 capsule, Rfl: 3    Ferrous Sulfate (IRON PO), Take 2 tablets by mouth Daily., Disp: , Rfl:     gabapentin (NEURONTIN) 300 MG capsule, Take 1 capsule by mouth Every 6 (Six) Hours., Disp: 120 capsule, Rfl: 1    glucose blood (Accu-Chek Guide Test) test strip, Use as instructed twice daily to test blood sugar, Disp: 100 each, Rfl: 11    glucose blood test strip, Use as instructed twice daily, Disp: 200 each, Rfl: 3    lamoTRIgine (LaMICtal) 150 MG tablet, Take 1 tablet by mouth 2 (Two) Times a Day., Disp: 60 tablet, Rfl: 3    Lancets (OneTouch Delica Plus [id number]) misc, Use as instructed twice daily, Disp: 200 each, Rfl: 3    lisinopril (PRINIVIL,ZESTRIL) 20 MG tablet, Take 1 tablet by mouth Daily., Disp: 90 tablet, Rfl:  "1    metFORMIN (GLUCOPHAGE) 500 MG tablet, Take 1 tablet by mouth Daily With Breakfast., Disp: 90 tablet, Rfl: 1    pantoprazole (PROTONIX) 40 MG EC tablet, Take 1 tablet by mouth Daily., Disp: 90 tablet, Rfl: 1    traMADol (ULTRAM) 50 MG tablet, Take 1 tablet by mouth Every 12 (Twelve) Hours., Disp: 60 tablet, Rfl: 1    traZODone (DESYREL) 150 MG tablet, Take 1/3 -1 tablet by mouth every night at bedtime as directed, Disp: 30 tablet, Rfl: 3    ALLERGIES  Allergies   Allergen Reactions    Iron GI Intolerance         Respiratory Exam    VITAL SIGNS:   /54   Pulse 80   Temp 98.4 °F (36.9 °C) (Oral)   Resp 18   Ht 157.5 cm (62\")   Wt 93 kg (205 lb)   LMP  (LMP Unknown)   SpO2 95%   BMI 37.49 kg/m²     Constitutional: Patient is alert and in no distress.  Patient with general body discomfort.    ENT: positive posterior pharyngeal erythema is noted with dry oromucosa nose is clear with no drainage.  Tympanic membranes intact and with posterior tympanic fluid noted with no erythema.    Cardiovascular: S1-S2 regular rate and rhythm.  No murmur, rubs or gallops.    Respiratory: Decreased breath sounds in both bases but no rhonchi or rales noted    Abdomen: Soft, nontender.  Bowel sounds are normal in all 4 quadrants.  There is no rebound or guarding noted.  There is no abdominal distention or hepatosplenomegaly..    Genitourinary: Patient is voiding appropriately.    Integument: No acute lesions noted.  Color appears to be normal.    [place] Coma Scale: Total score 15    Neurological: Patient is alert and oriented x4 and no acute findings noted.  Speech is fluent and cognition is normal.  No evidence of acute CVA.  Cranial nerves II through XII intact.  Patient with normal motor function as well as reflexes and sensation.    Psychiatric: Normal affect and mood      RADIOLOGY/PROCEDURES    CT Head Without Contrast   Final Result   Impression:         No acute intracranial abnormality.       Air-fluid levels and " opacification of the paranasal sinuses, correlate   for acute sinusitis.       This report was signed and finalized on 5/16/2025 1:02 PM by James Grover.          XR Chest 1 View   Final Result   1. A small ill-defined rounded density/nodule projecting over the left   hilum behind the cardiac silhouette probably represent a vessel. A   follow-up examination in PA projection may be obtained for further   evaluation of this area.   2. No active cardiopulmonary disease.                   This report was signed and finalized on 5/16/2025 12:06 PM by Dr. Vance Adams MD.                FUTURE APPOINTMENTS     Future Appointments   Date Time Provider Department Center   9/29/2025  2:00 PM Miguel Mckenzie, DO [facility]          COURSE & MEDICAL DECISION MAKING     Patient's partial differential diagnosis can include:    Pneumonia, pneumonitis, bronchitis, UTI, strep pharyngitis, electrolyte abnormality, bronchiolitis, COPD exacerbation, congestive heart failure, asthma exacerbation, pulmonary embolism, pneumothorax, pleural effusion, pulmonary edema, empyema,  atelectasis,viral pneumonia, and others    CBC, CMP, magnesium, COVID, urinalysis, strep, CT scan of the head, and chest x-ray will all be ordered.  Patient will get a bolus of saline at a sepsis bolus.    Patient CBC is noted to be mildly elevated at 13.7.  Patient is noted to also be in acute renal failure with a BUN of 42 and a creatinine of 3.83.  Upon returning from the CT scan patient was noted to be mildly hypotensive.  This is improved with fluid bolus.  She has received a total of 2 L of saline and now is getting saline at 250 mL/h.  With blood pressures improving systolic of the from the low blood pressure of 75/38 to now 98/58 with the last blood pressure of 119/54 patient will be stable for floor admission and further treatment of her acute renal failure urinalysis is still pending.    Called hospitalist for admission.  Urinalysis has  now returned and is noted to have some positive leuk esterase as well as bacteria 1+.  Will treat with Rocephin.    Spoke with nurse practitioner Priscilla Krueger about the patient and the patient will be admitted to Dr. Cosby.      Patient's level of risk: Moderate        CRITICAL CARE    CRITICAL CARE: No    CRITICAL CARE TIME: None        Also Old charts were reviewed per [employer] EMR.  Pertinent details are summarized above.  All laboratory, radiologic, and EKG studies that were performed in the Emergency Department were a necessary part of the evaluation needed to exclude unstable or emergent medical conditions:     Patient was hemodynamically and neurologically stable in the ED.   Pertinent studies were reviewed as above.     Recent Results (from the past 24 hours)   Green Top (Gel)    Collection Time: 05/16/25 11:09 AM   Result Value Ref Range    Extra Tube Hold for add-ons.    Lavender Top    Collection Time: 05/16/25 11:09 AM   Result Value Ref Range    Extra Tube hold for add-on    Gray Top    Collection Time: 05/16/25 11:09 AM   Result Value Ref Range    Extra Tube Hold for add-ons.    Light Blue Top    Collection Time: 05/16/25 11:09 AM   Result Value Ref Range    Extra Tube Hold for add-ons.    Comprehensive Metabolic Panel    Collection Time: 05/16/25 11:09 AM    Specimen: Blood   Result Value Ref Range    Glucose 110 (H) 65 - 99 mg/dL    BUN 42 (H) 6 - 20 mg/dL    Creatinine 3.83 (H) 0.57 - 1.00 mg/dL    Sodium 139 136 - 145 mmol/L    Potassium 4.2 3.5 - 5.2 mmol/L    Chloride 99 98 - 107 mmol/L    CO2 23.0 22.0 - 29.0 mmol/L    Calcium 9.3 8.6 - 10.5 mg/dL    Total Protein 7.6 6.0 - 8.5 g/dL    Albumin 3.9 3.5 - 5.2 g/dL    ALT (SGPT) 14 1 - 33 U/L    AST (SGOT) 19 1 - 32 U/L    Alkaline Phosphatase 112 39 - 117 U/L    Total Bilirubin 0.4 0.0 - 1.2 mg/dL    Globulin 3.7 gm/dL    A/G Ratio 1.1 g/dL    BUN/Creatinine Ratio 11.0 7.0 - 25.0    Anion Gap 17.0 (H) 5.0 - 15.0 mmol/L    eGFR 13.8 (L) >60.0  mL/min/1.73   Magnesium    Collection Time: 05/16/25 11:09 AM    Specimen: Blood   Result Value Ref Range    Magnesium 2.3 1.6 - 2.6 mg/dL   CBC Auto Differential    Collection Time: 05/16/25 11:09 AM    Specimen: Blood   Result Value Ref Range    WBC 13.70 (H) 3.40 - 10.80 10*3/mm3    RBC 4.75 3.77 - 5.28 10*6/mm3    Hemoglobin 11.0 (L) 12.0 - 15.9 g/dL    Hematocrit 34.8 34.0 - 46.6 %    MCV 73.3 (L) 79.0 - 97.0 fL    MCH 23.2 (L) 26.6 - 33.0 pg    MCHC 31.6 31.5 - 35.7 g/dL    RDW 17.2 (H) 12.3 - 15.4 %    RDW-SD 45.6 37.0 - 54.0 fl    MPV 10.1 6.0 - 12.0 fL    Platelets 424 140 - 450 10*3/mm3    Neutrophil % 78.5 (H) 42.7 - 76.0 %    Lymphocyte % 9.2 (L) 19.6 - 45.3 %    Monocyte % 9.4 5.0 - 12.0 %    Eosinophil % 0.7 0.3 - 6.2 %    Basophil % 0.7 0.0 - 1.5 %    Immature Grans % 1.5 (H) 0.0 - 0.5 %    Neutrophils, Absolute 10.74 (H) 1.70 - 7.00 10*3/mm3    Lymphocytes, Absolute 1.26 0.70 - 3.10 10*3/mm3    Monocytes, Absolute 1.29 (H) 0.10 - 0.90 10*3/mm3    Eosinophils, Absolute 0.10 0.00 - 0.40 10*3/mm3    Basophils, Absolute 0.10 0.00 - 0.20 10*3/mm3    Immature Grans, Absolute 0.21 (H) 0.00 - 0.05 10*3/mm3   Manual Differential    Collection Time: 05/16/25 11:09 AM    Specimen: Blood   Result Value Ref Range    Neutrophil % 71.3 42.7 - 76.0 %    Lymphocyte % 4.5 (L) 19.6 - 45.3 %    Monocyte % 8.1 5.0 - 12.0 %    Eosinophil % 0.4 0.3 - 6.2 %    Basophil % 0.0 0.0 - 1.5 %    Bands %  12.1 (H) 0.0 - 5.0 %    Metamyelocyte % 0.4 (H) 0.0 - 0.0 %    Atypical Lymphocyte % 3.2 0.0 - 5.0 %    Neutrophils Absolute 11.43 (H) 1.70 - 7.00 10*3/mm3    Lymphocytes Absolute 1.05 0.70 - 3.10 10*3/mm3    Monocytes Absolute 1.11 (H) 0.10 - 0.90 10*3/mm3    Eosinophils Absolute 0.05 0.00 - 0.40 10*3/mm3    Basophils Absolute 0.00 0.00 - 0.20 10*3/mm3    Acanthocytes Slight/1+ None Seen    Anisocytosis Mod/2+ None Seen    Hypochromia Slight/1+ None Seen    Microcytes Mod/2+ None Seen    Poikilocytes Slight/1+ None Seen     Polychromasia Slight/1+ None Seen    WBC Morphology Normal Normal    Clumped Platelets Present None Seen    Giant Platelets Slight/1+ None Seen   COVID-19 and FLU A/B PCR, 1 HR TAT - Swab, Nasopharynx    Collection Time: 05/16/25 11:49 AM    Specimen: Nasopharynx; Swab   Result Value Ref Range    COVID19 Not Detected Not Detected - Ref. Range    Influenza A PCR Not Detected Not Detected    Influenza B PCR Not Detected Not Detected   Rapid Strep A Screen - Swab, Throat    Collection Time: 05/16/25 11:49 AM    Specimen: Throat; Swab   Result Value Ref Range    Strep A Ag Negative Negative   Urinalysis With Culture If Indicated - Straight Cath    Collection Time: 05/16/25  1:16 PM    Specimen: Straight Cath; Urine   Result Value Ref Range    Color, UA Dark Yellow (A) Yellow, Straw    Appearance, UA Turbid (A) Clear    pH, UA <=5.0 5.0 - 8.0    Specific Gravity, UA 1.025 1.005 - 1.030    Glucose, UA Negative Negative    Ketones, UA 15 mg/dL (1+) (A) Negative    Bilirubin, UA Small (1+) (A) Negative    Blood, UA Negative Negative    Protein,  mg/dL (2+) (A) Negative    Leuk Esterase, UA Trace (A) Negative    Nitrite, UA Negative Negative    Urobilinogen, UA 1.0 E.U./dL 0.2 - 1.0 E.U./dL   Urinalysis, Microscopic Only - Straight Cath    Collection Time: 05/16/25  1:16 PM    Specimen: Straight Cath; Urine   Result Value Ref Range    RBC, UA 3-5 (A) None Seen, 0-2 /HPF    WBC, UA 6-10 (A) None Seen, 0-2 /HPF    Bacteria, UA 1+ (A) None Seen /HPF    Squamous Epithelial Cells, UA 3-6 (A) None Seen, 0-2 /HPF    Hyaline Casts, UA 0-2 None Seen /LPF    Granular Casts, UA 7-12 None Seen /LPF    Amorphous Crystals, UA Small/1+ None Seen /HPF    Methodology Manual Light Microscopy        The patient received:  Medications   sodium chloride 0.9 % infusion (250 mL/hr Intravenous New Bag 5/16/25 2657)   cefTRIAXone (ROCEPHIN) 1,000 mg in sodium chloride 0.9 % 100 mL MBP (has no administration in time range)   sodium chloride 0.9  % bolus 1,000 mL (0 mL Intravenous Stopped 5/16/25 1316)   sodium chloride 0.9 % bolus 2,019 mL (1,019 mL Intravenous New Bag 5/16/25 1317)            ED Disposition       ED Disposition   Intended Admit    Condition   --    Comment   --                   Dragon disclaimer:  Part of this note may be an electronic transcription/translation of spoken language to printed text using the Dragon Dictation System.     I have reviewed the patient’s prescription history via a prescription monitoring program.  This information is consistent with my knowledge of the patient’s controlled substance use history.    FINAL IMPRESSION   Diagnosis Plan   1. Acute renal failure, unspecified acute renal failure type        2. Dizziness        3. Urinary tract infection without hematuria, site unspecified        4. Other acute sinusitis, recurrence not specified              MD Sim Green Jr, Thomas Mark Jr., MD  05/16/25 1427      Electronically signed by Sterling Montemayor Jr., MD at 05/16/25 1427       Vital Signs (last 4 days) before discharge       Date/Time Temp Temp src Pulse Resp BP Patient Position SpO2    05/18/25 0758 98 (36.7) Oral 62 18 144/69 Sitting 91    05/18/25 0325 97.5 (36.4) Oral 66 18 147/57 -- 93    05/17/25 2355 97.8 (36.6) Oral 64 18 144/70 -- 92    05/17/25 2039 97.8 (36.6) Oral 60 18 121/84 -- 93    05/17/25 1533 97.8 (36.6) Oral 63 16 138/54 Lying 91    05/17/25 1100 98 (36.7) Oral 58 16 124/52 Lying 91    05/17/25 0933 -- -- 70 -- 134/56 Standing --    05/17/25 0931 -- -- 81 -- 127/93 Sitting --    05/17/25 0928 -- -- 77 -- 124/53 Lying 90    05/17/25 0800 98.3 (36.8) Oral 61 17 120/63 Lying 91    05/17/25 0308 98.4 (36.9) Oral 68 18 127/54 Lying 90    05/16/25 2331 98.3 (36.8) Oral 68 18 114/67 Lying 91    05/16/25 2010 98.2 (36.8) Oral 78 18 120/90 Lying 96    05/16/25 1802 98 (36.7) Oral 80 18 116/52 Lying 97    05/16/25 1435 -- -- 75 -- 98/57 -- 97    05/16/25 1417 -- -- 80 --  119/54 -- 95    05/16/25 1401 -- -- 71 -- 98/58 -- 96    05/16/25 1331 -- -- 79 -- 89/51 -- --    05/16/25 1315 -- -- 75 16 74/41  -- 87     BP: MD notified at 05/16/25 1315    SpO2: 2L NC appied at this time. MD aware at 05/16/25 1315    05/16/25 1300 -- -- 68 18 75/38 -- 93    05/16/25 1054 98.4 (36.9) Oral 89 16 147/93 -- 90          No current facility-administered medications for this encounter.     Current Outpatient Medications   Medication Sig Dispense Refill    amoxicillin-clavulanate (AUGMENTIN) 875-125 MG per tablet Take 1 tablet by mouth 2 (Two) Times a Day for 3 days. 6 tablet 0    [Paused] baclofen (LIORESAL) 10 MG tablet Take 1 tablet by mouth 3 (Three) Times a Day As Needed. (Patient taking differently: Take 1 tablet by mouth 3 (Three) Times a Day As Needed for Muscle Spasms.) 90 tablet 1    cetirizine (zyrTEC) 10 MG tablet Take 1 tablet by mouth Daily.      Cholecalciferol (Vitamin D) 50 MCG (2000 UT) tablet Take 1 tablet by mouth Daily. 90 tablet 1    DULoxetine (Cymbalta) 60 MG capsule Take 2 capsules by mouth Daily. 60 capsule 3    Ferrous Sulfate (IRON PO) Take 2 tablets by mouth Daily. Allergic to iron in tablet form but gummies are fine      fluticasone (FLONASE) 50 MCG/ACT nasal spray 2 sprays by Each Nare route Daily.      [Paused] gabapentin (NEURONTIN) 300 MG capsule Take 1 capsule by mouth Every 6 (Six) Hours. 120 capsule 1    lamoTRIgine (LaMICtal) 150 MG tablet Take 1 tablet by mouth 2 (Two) Times a Day. 60 tablet 3    lisinopril (PRINIVIL,ZESTRIL) 20 MG tablet Take 1 tablet by mouth Daily. 90 tablet 1    meclizine (ANTIVERT) 12.5 MG tablet Take 1 tablet by mouth 3 times a day. 30 tablet 0    metFORMIN (GLUCOPHAGE) 500 MG tablet Take 1 tablet by mouth Daily With Breakfast. 90 tablet 1    pantoprazole (PROTONIX) 40 MG EC tablet Take 1 tablet by mouth Daily. 90 tablet 1    [Paused] traZODone (DESYREL) 150 MG tablet Take 1 tablet by mouth Every Night.      Accu-Chek FastClix Lancets misc  "Use 1 each twice daily 102 each 11    Blood Glucose Monitoring Suppl (Accu-Chek Guide) w/Device kit Use as directed 1 kit 0    glucose blood (Accu-Chek Guide Test) test strip Use as instructed twice daily to test blood sugar 100 each 11    glucose blood test strip Use as instructed twice daily 200 each 3    Lancets (OneTouch Delica Plus [id number]) misc Use as instructed twice daily 200 each 3        Physician Progress Notes (last 4 days)        Priscilla Krueger, KAYLYNN at 05/17/25 1403       Attestation signed by Olivier Cosby MD at 05/17/25 5981 (Updated)      I have reviewed this documentation and agree.  I performed a substantive part of the MDM during the patient’s E/M visit. I personally evaluated   and examined the patient. I personally made or approved the documented management plan and acknowledge its risk   of complications.   (Independent Interpretation) My (EKG/X-Ray/US/CT) interpretation reviewed  (Discussion) Management/test interpretation discussed with NP and nursing staff.   \"I feel better.  I am not loopy.\"  Ambulatory  Stable gait  No distress  Normal respiratory effort  Urine cultures so far had not shown any growth.  Blood culture apparently showed aerobic bottle have gram-positive cocci staph spp.  This is believed to be a contaminant  Patient remains hemodynamically stable.  In room air, afebrile, normal white count with no left shift  Renal function continuing to improve with her blood pressure now improved creatinine at 1.6 compared to 3.83  Monitory and anticipate possible home soon.  I discontinued ceftriaxone.  Electronically signed by Olivier Cosby MD, 5/17/2025, 17:32 CDT.                        Patient Name: Maribel Whitaker  Date of Admission: 5/16/2025  Today's Date: 05/17/25  Length of Stay: 1  Primary Care Physician: Miguel Mckenzie DO    Subjective   Chief Complaint: Severe vertigo  HPI   Today: Awake and alert, continued mild disorientation, " significantly improved since yesterday.  Reviewed a.m. laboratory studies, creatinine 1.6 significantly improved.  PT evaluation for BPPV.  Discussed decreased fluid intake due to dizziness/nausea causing ALIN and hypotension and will continue to hold sedating medications.  Patient verbalized understanding.  She continues to deny dysuria, difficulty urinating or other signs of UTI.  However I do have concerns that she does not truly understand condition.    Documented weights    05/16/25 1054   Weight: 93 kg (205 lb)          Intake/Output Summary (Last 24 hours) at 5/17/2025 1533  Last data filed at 5/16/2025 1802  Gross per 24 hour   Intake 240 ml   Output --   Net 240 ml          Review of Systems   All pertinent negatives and positives are as above. All other systems have been reviewed and are negative unless otherwise stated.     Objective    Temp:  [98 °F (36.7 °C)-98.4 °F (36.9 °C)] 98 °F (36.7 °C)  Heart Rate:  [58-81] 58  Resp:  [16-18] 16  BP: (114-134)/(52-93) 124/52  Physical Exam  Vitals reviewed.   Constitutional:       Appearance: Normal appearance.   HENT:      Head: Normocephalic and atraumatic.      Mouth/Throat:      Mouth: Mucous membranes are moist.      Pharynx: Oropharynx is clear.   Eyes:      Conjunctiva/sclera: Conjunctivae normal.      Comments: Disconjugate gaze   Cardiovascular:      Rate and Rhythm: Normal rate and regular rhythm.      Comments: Sinus 63-82  Pulmonary:      Effort: Pulmonary effort is normal.      Breath sounds: Normal breath sounds.   Abdominal:      General: There is no distension.      Palpations: Abdomen is soft.   Musculoskeletal:      Cervical back: Normal range of motion and neck supple.      Right lower leg: No edema.      Left lower leg: No edema.   Skin:     General: Skin is warm and dry.   Neurological:      General: No focal deficit present.      Mental Status: She is alert.      Comments: Mild intermittent disorientation.  Suspect patient has underlying  disorder such as Asperger's or mild autism.  Baseline behavior unknown.   Psychiatric:         Mood and Affect: Mood normal.         Behavior: Behavior normal.       Results Review:  I have reviewed the labs, radiology results, and diagnostic studies.    Laboratory Data:   Results from last 7 days   Lab Units 05/17/25  0441 05/16/25  1109   WBC 10*3/mm3 10.39 13.70*   HEMOGLOBIN g/dL 10.4* 11.0*   HEMATOCRIT % 34.0 34.8   PLATELETS 10*3/mm3 285 424        Results from last 7 days   Lab Units 05/17/25  0441 05/16/25  1109   SODIUM mmol/L 140 139   POTASSIUM mmol/L 4.1 4.2   CHLORIDE mmol/L 104 99   CO2 mmol/L 23.0 23.0   BUN mg/dL 27* 42*   CREATININE mg/dL 1.60* 3.83*   CALCIUM mg/dL 8.8 9.3   BILIRUBIN mg/dL  --  0.4   ALK PHOS U/L  --  112   ALT (SGPT) U/L  --  14   AST (SGOT) U/L  --  19   GLUCOSE mg/dL 88 110*       Culture Data:     Microbiology Results (last 10 days)       Procedure Component Value - Date/Time    Blood Culture - Blood, Arm, Right [[id number]]  (Abnormal) Collected: 05/16/25 1445    Lab Status: Preliminary result Specimen: Blood from Arm, Right Updated: 05/17/25 1359     Blood Culture Abnormal Stain     Gram Stain Aerobic Bottle Gram positive cocci    Blood Culture ID, PCR - Blood, Arm, Right [[id number]]  (Abnormal) Collected: 05/16/25 1445    Lab Status: Final result Specimen: Blood from Arm, Right Updated: 05/17/25 1523     BCID, PCR Staph spp, not aureus or lugdunensis. Identification by BCID2 PCR.     BOTTLE TYPE Aerobic Bottle    Blood Culture - Blood, Hand, Left [[id number]]  (Normal) Collected: 05/16/25 1440    Lab Status: Preliminary result Specimen: Blood from Hand, Left Updated: 05/17/25 1500     Blood Culture No growth at 24 hours    Urine Culture - Urine, Straight Cath [[id number]]  (Normal) Collected: 05/16/25 1316    Lab Status: Preliminary result Specimen: Urine from Straight Cath Updated: 05/17/25 1308     Urine Culture No growth    COVID PRE-OP / PRE-PROCEDURE SCREENING ORDER (NO  ISOLATION) - Swab, Nasopharynx [[id number]]  (Normal) Collected: 05/16/25 1149    Lab Status: Final result Specimen: Swab from Nasopharynx Updated: 05/16/25 1218    Narrative:      The following orders were created for panel order COVID PRE-OP / PRE-PROCEDURE SCREENING ORDER (NO ISOLATION) - Swab, Nasopharynx.  Procedure                               Abnormality         Status                     ---------                               -----------         ------                     COVID-19 and FLU A/B PCR...[[id number]]  Normal              Final result                 Please view results for these tests on the individual orders.    COVID-19 and FLU A/B PCR, 1 HR TAT - Swab, Nasopharynx [[id number]]  (Normal) Collected: 05/16/25 1149    Lab Status: Final result Specimen: Swab from Nasopharynx Updated: 05/16/25 1218     COVID19 Not Detected     Influenza A PCR Not Detected     Influenza B PCR Not Detected    Narrative:      Fact sheet for providers: https://www.fda.gov/media/468890/download    Fact sheet for patients: https://www.fda.gov/media/018690/download    Test performed by PCR.    Rapid Strep A Screen - Swab, Throat [[id number]]  (Normal) Collected: 05/16/25 1149    Lab Status: Final result Specimen: Swab from Throat Updated: 05/16/25 1209     Strep A Ag Negative    Beta Strep Culture, Throat - Swab, Throat [[id number]]  (Normal) Collected: 05/16/25 1149    Lab Status: Preliminary result Specimen: Swab from Throat Updated: 05/17/25 0951     Throat Culture, Beta Strep No Beta Hemolytic Streptococcus Isolated    Narrative:      Group A Strep incidence is low in adults. Positive culture for Beta hemolytic Streptococcus species can reflect colonization and not true infection. Please correlate clinically.             Radiology Data:   Imaging Results (Last 7 Days)       Procedure Component Value Units Date/Time    CT Head Without Contrast [[id number]] Collected: 05/16/25 1259     Updated: 05/16/25 1305    Narrative:       Exam: CT HEAD WO CONTRAST- 5/16/2025 11:45 AM     HISTORY: Family reports confusion       DOSE LENGTH PRODUCT: 852.72 mGy.cm mGy cm. Automated exposure control  was also utilized to decrease patient radiation dose.     Technique:  Helically acquired CT of the brain without IV contrast was performed.  Sagittal and coronal reformations are also provided for review. Soft  tissue and bone kernels are available for interpretation.     Comparison: 9/19/2024.     Findings:     Ventricles and extra-axial CSF spaces are normal in size.     No intraparenchymal or extra-axial hemorrhage.     Gray-white matter differentiation is preserved.     Orbits are grossly unremarkable. Air-fluid levels within the bilateral  frontal, sphenoid, and maxillary sinuses with subtotal opacification of  the ethmoid air cells. Mastoid air cells are grossly clear.     No suspicious calvarial or extracranial soft tissue abnormality.       Impression:      Impression:       No acute intracranial abnormality.     Air-fluid levels and opacification of the paranasal sinuses, correlate  for acute sinusitis.     This report was signed and finalized on 5/16/2025 1:02 PM by James Grover.       XR Chest 1 View [[id number]] Collected: 05/16/25 1204     Updated: 05/16/25 1209    Narrative:      EXAMINATION: XR CHEST 1 VW-     5/16/2025 11:02 AM     HISTORY: Cough     A frontal projection of the chest is compared with the previous study  dated 12/20/2024.     The lungs are moderately well-expanded.     There is a poorly defined nodule projected over the left hilum which  probably represent a vessel. A follow-up examination with particular  attention to this area may be obtained.     Moderate elevation right diaphragm appears more pronounced due to  lordotic projection.     There is no evidence of recent infiltrate, pleural effusion, pulmonary  congestion or pneumothorax.     The heart size in the normal range.     No acute bony abnormality.        Impression:      1. A small ill-defined rounded density/nodule projecting over the left  hilum behind the cardiac silhouette probably represent a vessel. A  follow-up examination in PA projection may be obtained for further  evaluation of this area.  2. No active cardiopulmonary disease.              This report was signed and finalized on 5/16/2025 12:06 PM by Dr. Vance Adams MD.                I have reviewed the patient's current medications.     cefTRIAXone, 1,000 mg, Intravenous, Q24H  cetirizine, 10 mg, Oral, Daily  DULoxetine, 120 mg, Oral, Daily  ferric gluconate, 125 mg, Intravenous, Daily  fluticasone, 2 spray, Each Nare, Daily  [Held by provider] gabapentin, 300 mg, Oral, Q6H  insulin lispro, 2-7 Units, Subcutaneous, 4x Daily AC & at Bedtime  lamoTRIgine, 150 mg, Oral, BID  pantoprazole, 40 mg, Oral, Daily  [Held by provider] traMADol, 50 mg, Oral, Q12H            Assessment/Plan   Assessment  Active Hospital Problems    Diagnosis     **Acute renal failure     Acute sinusitis     Acute cystitis with hematuria     Metabolic encephalopathy     Vertigo        Treatment Plan  1.  Metabolic encephalopathy; neurochecks, hold home sedating medication     2.  Acute renal failure; normal saline 75 mL/hour, BMP in a.m.     3.  Acute cystitis with hematuria/acute sinusitis; positive blood culture x 1-suspect contamination however will continue Rocephin  g IV day 2, await further information, continue Flonase and home Zyrtec     4.  Vertigo; consult PT for BPPV evaluation, hold Antivert due to sedating side effect    5.  Sepsis with acute organ dysfunction; patient received fluid bolus in the emergency department, labs in a.m., antibiotics started    Home medications reviewed and restarted as appropriate  DVT prophylaxis with SCDs     Medical Decision Making  Number and Complexity of problems: 5  Metabolic encephalopathy; acute, high complexity, significant improvement  Acute cystitis with hematuria; acute,  high complexity, unchanged  Acute sinusitis; acute, moderate complexity, unchanged  Sepsis; felt less likely due to normal laboratory studies.  It is felt patient's hypotension and ALIN secondary to dehydration from decreased fluid intake secondary to dizziness-removed from list  Vertigo; acute chronic, high complexity, unchanged  Differential Diagnosis: None     Conditions and Status        Condition is unchanged.     MDM Data  External documents reviewed: Epic record  Cardiac tracing (EKG, telemetry) interpretation: Reviewed  Radiology interpretation: Reviewed  Labs reviewed: Yes  Any tests that were considered but not ordered: No     Decision rules/scores evaluated (example [id number], Wells, etc): No     Discussed with: Patient and Dr. Cosby     Care Planning  Shared decision making: Patient and Dr. Cosby  Code status and discussions: Full  Surrogate Decision Maker     Disposition  Social Determinants of Health that impact treatment or disposition: None  I expect the patient to be discharged to home tomorrow.    Electronically signed by KAYLYNN Major-BC, 05/17/25, 15:33 CDT.     Electronically signed by Olivier Cosby MD at 05/17/25 1747          Discharge Summary        Priscilla Krueger APRN at 05/18/25 0808       Attestation signed by Olivier Cosby MD at 05/18/25 1147 (Updated)    I have reviewed this documentation and agree.  I performed a substantive part of the MDM during the patient’s E/M visit. I personally evaluated   and examined the patient. I personally made or approved the documented management plan and acknowledge its risk   of complications.   (Independent Interpretation) My (EKG/X-Ray/US/CT) interpretation as outlined below  (Discussion) Management/test interpretation discussed with NP and patient with family  Patient doing better  No distress  States that she walks to the bathroom by herself without any issues  Hemodynamically  stable  Instructed to monitor blood pressure regularly at home and bring record to primary care provider.  Other instructions as per NP  Informed her renal function back to normal.      Vitals:     05/18/25 0758   BP: 144/69   Pulse: 62   Resp: 18   Temp: 98 °F (36.7 °C)   SpO2: 91%       NP started on Augmentin for sinusitis. Imaging study confirmed its presence.           Electronically signed by Olivier Cosby MD, 5/18/2025, 08:07 CDT.                         [facility]  DISCHARGE SUMMARY       Date of Admission: 5/16/2025  Date of Discharge:  5/18/2025  Primary Care Physician: Miguel Mckenzie DO    Presenting Problem/History of Present Illness:  Severe vertigo, confusion    Final Discharge Diagnoses:  Active Hospital Problems    Diagnosis     **Acute renal failure     Acute sinusitis     Acute cystitis with hematuria     Metabolic encephalopathy     Vertigo        Consults: None    Procedures Performed: None    Pertinent Test Results:       Imaging Results (All)       Procedure Component Value Units Date/Time    CT Head Without Contrast [[id number]] Collected: 05/16/25 1259     Updated: 05/16/25 1305    Narrative:      Exam: CT HEAD WO CONTRAST- 5/16/2025 11:45 AM     HISTORY: Family reports confusion       DOSE LENGTH PRODUCT: 852.72 mGy.cm mGy cm. Automated exposure control  was also utilized to decrease patient radiation dose.     Technique:  Helically acquired CT of the brain without IV contrast was performed.  Sagittal and coronal reformations are also provided for review. Soft  tissue and bone kernels are available for interpretation.     Comparison: 9/19/2024.     Findings:     Ventricles and extra-axial CSF spaces are normal in size.     No intraparenchymal or extra-axial hemorrhage.     Gray-white matter differentiation is preserved.     Orbits are grossly unremarkable. Air-fluid levels within the bilateral  frontal, sphenoid, and  maxillary sinuses with subtotal opacification of  the ethmoid air cells. Mastoid air cells are grossly clear.     No suspicious calvarial or extracranial soft tissue abnormality.       Impression:      Impression:       No acute intracranial abnormality.     Air-fluid levels and opacification of the paranasal sinuses, correlate  for acute sinusitis.     This report was signed and finalized on 5/16/2025 1:02 PM by James Grover.       XR Chest 1 View [[id number]] Collected: 05/16/25 1204     Updated: 05/16/25 1209    Narrative:      EXAMINATION: XR CHEST 1 VW-     5/16/2025 11:02 AM     HISTORY: Cough     A frontal projection of the chest is compared with the previous study  dated 12/20/2024.     The lungs are moderately well-expanded.     There is a poorly defined nodule projected over the left hilum which  probably represent a vessel. A follow-up examination with particular  attention to this area may be obtained.     Moderate elevation right diaphragm appears more pronounced due to  lordotic projection.     There is no evidence of recent infiltrate, pleural effusion, pulmonary  congestion or pneumothorax.     The heart size in the normal range.     No acute bony abnormality.       Impression:      1. A small ill-defined rounded density/nodule projecting over the left  hilum behind the cardiac silhouette probably represent a vessel. A  follow-up examination in PA projection may be obtained for further  evaluation of this area.  2. No active cardiopulmonary disease.              This report was signed and finalized on 5/16/2025 12:06 PM by Dr. Vance Adams MD.             LAB RESULTS:      Lab 05/17/25  0441 05/16/25  1440 05/16/25  1109   WBC 10.39  --  13.70*   HEMOGLOBIN 10.4*  --  11.0*   HEMATOCRIT 34.0  --  34.8   PLATELETS 285  --  424   NEUTROS ABS 7.32*  --  10.74*  11.43*   IMMATURE GRANS (ABS)  --   --  0.21*   LYMPHS ABS  --   --  1.26   MONOS ABS  --   --  1.29*   EOS ABS 0.21  --  0.10  0.05    MCV 75.7*  --  73.3*   PROCALCITONIN  --  0.25  --    LACTATE  --  0.8  --          Lab 05/18/25  0500 05/17/25  0441 05/16/25  1109   SODIUM 142 140 139   POTASSIUM 3.9 4.1 4.2   CHLORIDE 105 104 99   CO2 25.0 23.0 23.0   ANION GAP 12.0 13.0 17.0*   BUN 16 27* 42*   CREATININE 0.78 1.60* 3.83*   EGFR 93.2 39.4* 13.8*   GLUCOSE 83 88 110*   CALCIUM 8.6 8.8 9.3   MAGNESIUM  --   --  2.3   HEMOGLOBIN A1C  --   --  5.80*         Lab 05/16/25  1109   TOTAL PROTEIN 7.6   ALBUMIN 3.9   GLOBULIN 3.7   ALT (SGPT) 14   AST (SGOT) 19   BILIRUBIN 0.4   ALK PHOS 112                 Lab 05/17/25  0441   IRON 17*   IRON SATURATION (TSAT) 5*   TIBC 356   TRANSFERRIN 239   FERRITIN 121.50         Brief Urine Lab Results  (Last result in the past 365 days)        Color   Clarity   Blood   Leuk Est   Nitrite   Protein   CREAT   Urine HCG        05/16/25 1316 Dark Yellow   Turbid   Negative   Trace   Negative   100 mg/dL (2+)                 Microbiology Results (last 10 days)       Procedure Component Value - Date/Time    Blood Culture - Blood, Arm, Right [[id number]]  (Abnormal) Collected: 05/16/25 1445    Lab Status: Final result Specimen: Blood from Arm, Right Updated: 05/18/25 0523     Blood Culture Staphylococcus, coagulase negative     Isolated from Aerobic Bottle     Gram Stain Aerobic Bottle Gram positive cocci    Narrative:      Probable contaminant requires clinical correlation, susceptibility not performed unless requested by physician.      Blood Culture ID, PCR - Blood, Arm, Right [[id number]]  (Abnormal) Collected: 05/16/25 1445    Lab Status: Final result Specimen: Blood from Arm, Right Updated: 05/17/25 1523     BCID, PCR Staph spp, not aureus or lugdunensis. Identification by BCID2 PCR.     BOTTLE TYPE Aerobic Bottle    Blood Culture - Blood, Hand, Left [[id number]]  (Normal) Collected: 05/16/25 1440    Lab Status: Preliminary result Specimen: Blood from Hand, Left Updated: 05/17/25 1500     Blood Culture No growth at  24 hours    Urine Culture - Urine, Straight Cath [[id number]]  (Normal) Collected: 05/16/25 1316    Lab Status: Preliminary result Specimen: Urine from Straight Cath Updated: 05/17/25 1308     Urine Culture No growth    COVID PRE-OP / PRE-PROCEDURE SCREENING ORDER (NO ISOLATION) - Swab, Nasopharynx [[id number]]  (Normal) Collected: 05/16/25 1149    Lab Status: Final result Specimen: Swab from Nasopharynx Updated: 05/16/25 1218    Narrative:      The following orders were created for panel order COVID PRE-OP / PRE-PROCEDURE SCREENING ORDER (NO ISOLATION) - Swab, Nasopharynx.  Procedure                               Abnormality         Status                     ---------                               -----------         ------                     COVID-19 and FLU A/B PCR...[[id number]]  Normal              Final result                 Please view results for these tests on the individual orders.    COVID-19 and FLU A/B PCR, 1 HR TAT - Swab, Nasopharynx [[id number]]  (Normal) Collected: 05/16/25 1149    Lab Status: Final result Specimen: Swab from Nasopharynx Updated: 05/16/25 1218     COVID19 Not Detected     Influenza A PCR Not Detected     Influenza B PCR Not Detected    Narrative:      Fact sheet for providers: https://www.fda.gov/media/169389/download    Fact sheet for patients: https://www.fda.gov/media/379368/download    Test performed by PCR.    Rapid Strep A Screen - Swab, Throat [[id number]]  (Normal) Collected: 05/16/25 1149    Lab Status: Final result Specimen: Swab from Throat Updated: 05/16/25 1209     Strep A Ag Negative    Beta Strep Culture, Throat - Swab, Throat [[id number]]  (Normal) Collected: 05/16/25 1149    Lab Status: Preliminary result Specimen: Swab from Throat Updated: 05/17/25 0951     Throat Culture, Beta Strep No Beta Hemolytic Streptococcus Isolated    Narrative:      Group A Strep incidence is low in adults. Positive culture for Beta hemolytic Streptococcus species can reflect colonization  and not true infection. Please correlate clinically.          Microbiology Results (last 10 days)       Procedure Component Value - Date/Time    Blood Culture - Blood, Arm, Right [[id number]]  (Abnormal) Collected: 05/16/25 1445    Lab Status: Final result Specimen: Blood from Arm, Right Updated: 05/18/25 0523     Blood Culture Staphylococcus, coagulase negative     Isolated from Aerobic Bottle     Gram Stain Aerobic Bottle Gram positive cocci    Narrative:      Probable contaminant requires clinical correlation, susceptibility not performed unless requested by physician.      Blood Culture ID, PCR - Blood, Arm, Right [[id number]]  (Abnormal) Collected: 05/16/25 1445    Lab Status: Final result Specimen: Blood from Arm, Right Updated: 05/17/25 1523     BCID, PCR Staph spp, not aureus or lugdunensis. Identification by BCID2 PCR.     BOTTLE TYPE Aerobic Bottle    Blood Culture - Blood, Hand, Left [[id number]]  (Normal) Collected: 05/16/25 1440    Lab Status: Preliminary result Specimen: Blood from Hand, Left Updated: 05/17/25 1500     Blood Culture No growth at 24 hours    Urine Culture - Urine, Straight Cath [[id number]]  (Normal) Collected: 05/16/25 1316    Lab Status: Preliminary result Specimen: Urine from Straight Cath Updated: 05/17/25 1308     Urine Culture No growth    COVID PRE-OP / PRE-PROCEDURE SCREENING ORDER (NO ISOLATION) - Swab, Nasopharynx [[id number]]  (Normal) Collected: 05/16/25 1149    Lab Status: Final result Specimen: Swab from Nasopharynx Updated: 05/16/25 1218    Narrative:      The following orders were created for panel order COVID PRE-OP / PRE-PROCEDURE SCREENING ORDER (NO ISOLATION) - Swab, Nasopharynx.  Procedure                               Abnormality         Status                     ---------                               -----------         ------                     COVID-19 and FLU A/B PCR...[[id number]]  Normal              Final result                 Please view results for these  tests on the individual orders.    COVID-19 and FLU A/B PCR, 1 HR TAT - Swab, Nasopharynx [[id number]]  (Normal) Collected: 05/16/25 1149    Lab Status: Final result Specimen: Swab from Nasopharynx Updated: 05/16/25 1218     COVID19 Not Detected     Influenza A PCR Not Detected     Influenza B PCR Not Detected    Narrative:      Fact sheet for providers: https://www.fda.gov/media/473498/download    Fact sheet for patients: https://www.fda.gov/media/076982/download    Test performed by PCR.    Rapid Strep A Screen - Swab, Throat [[id number]]  (Normal) Collected: 05/16/25 1149    Lab Status: Final result Specimen: Swab from Throat Updated: 05/16/25 1209     Strep A Ag Negative    Beta Strep Culture, Throat - Swab, Throat [[id number]]  (Normal) Collected: 05/16/25 1149    Lab Status: Preliminary result Specimen: Swab from Throat Updated: 05/17/25 0951     Throat Culture, Beta Strep No Beta Hemolytic Streptococcus Isolated    Narrative:      Group A Strep incidence is low in adults. Positive culture for Beta hemolytic Streptococcus species can reflect colonization and not true infection. Please correlate clinically.             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  Patient presented to urgent care with complaints of severe vertigo.  She reported her PCP has been following and was supposed to start physical therapy however over the past 3 days vertigo has worsened to the point she is unable to walk.  She presented to urgent care via wheelchair.   reported poor memory, difficulty speaking, falling asleep quickly.  Patient reported blurred vision.  It is documented patient is unkept with clothes worn inside out when she presented to urgent care.  With concerns for CVA she was  "transported to ED for evaluation.  ED workup revealed symptoms of sepsis, ALIN with a creatinine of 3.83, she does have a bandemia of 12%, UTI, acute sinusitis.  At time of my assessment patient did not arouse with sternal rub.  With continued stimuli sternal rub, patting upper extremities she finally awakened.  She says she was at work today.  I have no way of verifyin.  From documentation it appears she has been too sick to work over the past 3 days.   is not available at this time.  She is oriented x 3 after awakening.  Nurse did report she had an episode of nonresponsiveness earlier with a drop in oxygen saturation prompting addition of oxygen 2 L nasal cannula.  She denies pain.  She does repeat the same questions.  She has been taking Dramamine and I feel this is playing a part in her somnolence/metabolic encephalopathy due to ALIN.  Patient also takes multiple sedating home medications.  We will hold those for now.  She is admitted for further evaluation and treatment.     Today: Patient is alert and oriented.  She is able to answer all questions appropriately.  She has had no dizziness while walking to the bathroom.  She has no pain or complaints.  She is agreeable to discharge.      It is felt sepsis less likely as initial presentation secondary to ALIN and use of multiple sedating medications.  UTI noted on routine urinalysis however she has had no urinary symptoms of infection.  She did receive 2 days therapy of Rocephin.  Blood culture positive for Staphylococcus coagulase, felt to be contaminant. Initial workup did reveal acute sinusitis which could also be causing dizziness.  Still complaining of \"popping\" in the right ear.  We will continue to treat with Augmentin x 3 more days, Flonase and Zyrtec.  She will need to follow-up with her primary care provider next week.  She verbalizes understanding that she will have to call for an appointment as soon as possible.  She states she will call for " "outpatient physical therapy evaluation as directed by PCP.  Patient PT consulted for BPPV evaluation unfortunately this was not completed.  Patient does have iron deficiency and was treated with IV replacement.  This should also improve fatigability.  I did explain she would need to hold sedating medications until seen by Dr. Mckenzie, drink at least 32 ounces of water per day to prevent recurrence of kidney failure.  She verbalizes understanding however I am unsure she will follow through as directed.  She is stable for discharge    Patient has reached the maximum benefit of hospitalization and is stable for discharge  Patient has been evaluated today 05/18/25 and is stable for discharge.   Physical Exam on Discharge:  /69 (BP Location: Right arm, Patient Position: Sitting)   Pulse 62   Temp 98 °F (36.7 °C) (Oral)   Resp 18   Ht 157.5 cm (62\")   Wt 93 kg (205 lb)   LMP  (LMP Unknown)   SpO2 91%   BMI 37.49 kg/m²   Physical Exam  Vitals reviewed.   Constitutional:       Appearance: Normal appearance.   HENT:      Head: Normocephalic and atraumatic.      Mouth/Throat:      Mouth: Mucous membranes are moist.      Pharynx: Oropharynx is clear.   Eyes:      Conjunctiva/sclera: Conjunctivae normal.      Comments: disconjugate gaze   Cardiovascular:      Rate and Rhythm: Normal rate and regular rhythm.   Pulmonary:      Effort: Pulmonary effort is normal.      Breath sounds: Normal breath sounds.   Abdominal:      General: There is no distension.      Palpations: Abdomen is soft.   Musculoskeletal:      Cervical back: Normal range of motion and neck supple.      Right lower leg: No edema.      Left lower leg: No edema.   Skin:     General: Skin is warm and dry.   Neurological:      General: No focal deficit present.      Mental Status: She is alert and oriented to person, place, and time.   Psychiatric:         Mood and Affect: Mood normal.         Behavior: Behavior normal.       Condition on Discharge: " Stable    Discharge Disposition:  Home or Self Care    Discharge Medications:     Discharge Medications        PAUSE taking these medications        Instructions Start Date   baclofen 10 MG tablet  Wait to take this until your doctor or other care provider tells you to start again.  Commonly known as: LIORESAL  What changed: reasons to take this   10 mg, Oral, 3 Times Daily PRN      gabapentin 300 MG capsule  Wait to take this until your doctor or other care provider tells you to start again.  Commonly known as: NEURONTIN   300 mg, Oral, Every 6 Hours      traZODone 150 MG tablet  Wait to take this until your doctor or other care provider tells you to start again.  Commonly known as: DESYREL   150 mg, Oral, Nightly             New Medications        Instructions Start Date   amoxicillin-clavulanate 875-125 MG per tablet  Commonly known as: AUGMENTIN   1 tablet, Oral, 2 Times Daily      fluticasone 50 MCG/ACT nasal spray  Commonly known as: FLONASE   2 sprays, Each Nare, Daily      meclizine 12.5 MG tablet  Commonly known as: ANTIVERT   12.5 mg, Oral, 3 times daily             Continue These Medications        Instructions Start Date   Accu-Chek Guide w/Device kit   Use as directed      cetirizine 10 MG tablet  Commonly known as: zyrTEC   10 mg, Oral, Daily      DULoxetine 60 MG capsule  Commonly known as: Cymbalta   120 mg, Oral, Daily      glucose blood test strip   Use as instructed twice daily      Accu-Chek Guide Test test strip  Generic drug: glucose blood   Use as instructed twice daily to test blood sugar      IRON PO   2 tablets, Oral, Daily, Allergic to iron in tablet form but gummies are fine       lamoTRIgine 150 MG tablet  Commonly known as: LaMICtal   150 mg, Oral, 2 Times Daily      lisinopril 20 MG tablet  Commonly known as: PRINIVIL,ZESTRIL   20 mg, Oral, Daily      metFORMIN 500 MG tablet  Commonly known as: GLUCOPHAGE   500 mg, Oral, Daily With Breakfast      OneTouch Delica Plus [id number] misc    Use as instructed twice daily      Accu-Chek FastClix Lancets misc   Use 1 each twice daily      pantoprazole 40 MG EC tablet  Commonly known as: PROTONIX   40 mg, Oral, Daily      Vitamin D 50 MCG (2000 UT) tablet   2,000 Units, Oral, Daily               Discharge Diet:   Diet Instructions       Diet: Diabetic Diets, Cardiac Diets; Healthy Heart (2-3 Na+); Regular (IDDSI 7); Thin (IDDSI 0); Consistent Carbohydrate      Discharge Diet:  Diabetic Diets  Cardiac Diets       Cardiac Diet: Healthy Heart (2-3 Na+)    Texture: Regular (IDDSI 7)    Fluid Consistency: Thin (IDDSI 0)    Diabetic Diet: Consistent Carbohydrate            Activity at Discharge:   Activity Instructions       Activity as Tolerated      Work Restrictions      Provide patient with work excuse 5/16 - 5/18, 2025    Type of Restriction: Work    May Return to Work: Immediately    With / Without Restrictions: Without Restrictions            Discharge Instructions:   1.  Patient to call Dr. Velasco for appointment next week; discuss possible referral to neurology if dizziness persists despite treatment  2.  New medications:              - Acute sinusitis-amoxicillin clavulanic today 1 tablet twice daily for 3 days,    fluticasone nasal spray 2 sprays each nare daily-use bottle supplied by hospital, continue home Zyrtec daily              - Meclizine 12.5 mg 3 times a day for dizziness-review effectiveness with primary care provider  3.  Hold baclofen, gabapentin and trazodone until seen by primary care provider due to altered mental status  4.  Drink at least 32 ounces of water per day to prevent recurrent decreased kidney function  5.  Return to the emergency department for recurrent confusion, intractable dizziness, nausea, vomiting    Follow-up Appointments:   Future Appointments   Date Time Provider Department Center   9/29/2025  2:00 PM Miguel Mckenzie,  [facility]       Test Results Pending at Discharge: None    Electronically signed by  Priscilla Krueger, KAYLYNN-BC, 05/18/25, 08:08 CDT.    Time: 60 minutes.          Electronically signed by Olivier Cosby MD at 05/18/25 1147         5/16     Goal Outcome Evaluation: Patient admitted from the ER with complaints of dizziness, cough, runny nose for one week. Admitting diagnosis is acute renal failure and UTI. Urine has 1+ bacteria, creatinine is 3.83. No complaints of pain at this time. IV fluids infusing as ordered. Patient safety to be maintained this shift, continue to monitor and report abnormal to provide.        5/17  AOx4, VSS. Orthos neg. RA. SB/SR 53-83. Mild headache, prn tylenol. Blood cx positive. Cont. IV abx. Monitor renal function. IV fluids

## 2025-05-19 NOTE — OUTREACH NOTE
Call Center TCM Note      Flowsheet Row Responses   [facility], [place], operated by [facility] patient discharged from? [place]   Does the patient have one of the following disease processes/diagnoses(primary or secondary)? Other   TCM attempt successful? Yes   Call start time 1204   Call end time 1207   Discharge diagnosis UTI,  Acute renal failure   Meds reviewed with patient/caregiver? Yes   Is the patient having any side effects they believe may be caused by any medication additions or changes? No   Does the patient have all medications ordered at discharge? Yes   Is the patient taking all medications as directed (includes completed medication regime)? Yes   Comments Hospital follow up appt with PCP office tomorrow (5/20)   Does the patient have an appointment with their PCP within 7-14 days of discharge? Yes   Has home health visited the patient within 72 hours of discharge? N/A   Psychosocial issues? No   Did the patient receive a copy of their discharge instructions? Yes   Nursing interventions Reviewed instructions with patient   What is the patient's perception of their health status since discharge? Improving   Is the patient/caregiver able to teach back signs and symptoms related to disease process for when to call PCP? Yes   Is the patient/caregiver able to teach back signs and symptoms related to disease process for when to call 911? Yes   Is the patient/caregiver able to teach back the hierarchy of who to call/visit for symptoms/problems? PCP, Specialist, Home health nurse, Urgent Care, ED, 911 Yes   If the patient is a current smoker, are they able to teach back resources for cessation? Not a smoker   TCM call completed? Yes   Wrap up additional comments Doing much better, denies any questions or concerns, confirmed her hospital follow up appt with PCP office for tomorrow (5/20).   Call end time 1207   Would this patient benefit from a Referral to [facility] Social Work? No   Is the patient interested in additional calls from an  ambulatory ? No            Jessica PATRICIA - Registered Nurse    5/19/2025, 12:07 CDT

## 2025-05-20 ENCOUNTER — LAB (OUTPATIENT)
Dept: LAB | Facility: HOSPITAL | Age: 50
End: 2025-05-20
Payer: COMMERCIAL

## 2025-05-20 ENCOUNTER — OFFICE VISIT (OUTPATIENT)
Dept: INTERNAL MEDICINE | Facility: CLINIC | Age: 50
End: 2025-05-20
Payer: COMMERCIAL

## 2025-05-20 VITALS
TEMPERATURE: 97 F | WEIGHT: 199 LBS | HEART RATE: 107 BPM | DIASTOLIC BLOOD PRESSURE: 99 MMHG | BODY MASS INDEX: 36.62 KG/M2 | RESPIRATION RATE: 16 BRPM | SYSTOLIC BLOOD PRESSURE: 135 MMHG | HEIGHT: 62 IN | OXYGEN SATURATION: 97 %

## 2025-05-20 DIAGNOSIS — G93.41 METABOLIC ENCEPHALOPATHY: ICD-10-CM

## 2025-05-20 DIAGNOSIS — J01.90 ACUTE NON-RECURRENT SINUSITIS, UNSPECIFIED LOCATION: ICD-10-CM

## 2025-05-20 DIAGNOSIS — R31.9 URINARY TRACT INFECTION WITH HEMATURIA, SITE UNSPECIFIED: ICD-10-CM

## 2025-05-20 DIAGNOSIS — N39.0 URINARY TRACT INFECTION WITH HEMATURIA, SITE UNSPECIFIED: ICD-10-CM

## 2025-05-20 DIAGNOSIS — R44.3 HALLUCINATION: ICD-10-CM

## 2025-05-20 DIAGNOSIS — N17.9 ACUTE RENAL FAILURE, UNSPECIFIED ACUTE RENAL FAILURE TYPE: ICD-10-CM

## 2025-05-20 DIAGNOSIS — N17.9 ACUTE RENAL FAILURE, UNSPECIFIED ACUTE RENAL FAILURE TYPE: Primary | ICD-10-CM

## 2025-05-20 LAB
ALBUMIN SERPL-MCNC: 4.2 G/DL (ref 3.5–5.2)
ALBUMIN/GLOB SERPL: 1.1 G/DL
ALP SERPL-CCNC: 136 U/L (ref 39–117)
ALT SERPL W P-5'-P-CCNC: 15 U/L (ref 1–33)
ANION GAP SERPL CALCULATED.3IONS-SCNC: 18 MMOL/L (ref 5–15)
AST SERPL-CCNC: 19 U/L (ref 1–32)
BILIRUB SERPL-MCNC: 0.4 MG/DL (ref 0–1.2)
BUN SERPL-MCNC: 19 MG/DL (ref 6–20)
BUN/CREAT SERPL: 20.9 (ref 7–25)
CALCIUM SPEC-SCNC: 10.1 MG/DL (ref 8.6–10.5)
CHLORIDE SERPL-SCNC: 98 MMOL/L (ref 98–107)
CO2 SERPL-SCNC: 24 MMOL/L (ref 22–29)
CREAT SERPL-MCNC: 0.91 MG/DL (ref 0.57–1)
CRP SERPL-MCNC: 3.86 MG/DL (ref 0–0.5)
DEPRECATED RDW RBC AUTO: 43.8 FL (ref 37–54)
EGFRCR SERPLBLD CKD-EPI 2021: 77.5 ML/MIN/1.73
ERYTHROCYTE [DISTWIDTH] IN BLOOD BY AUTOMATED COUNT: 18.2 % (ref 12.3–15.4)
GLOBULIN UR ELPH-MCNC: 3.9 GM/DL
GLUCOSE SERPL-MCNC: 87 MG/DL (ref 65–99)
HCT VFR BLD AUTO: 40.4 % (ref 34–46.6)
HGB BLD-MCNC: 12.7 G/DL (ref 12–15.9)
MCH RBC QN AUTO: 22.8 PG (ref 26.6–33)
MCHC RBC AUTO-ENTMCNC: 31.4 G/DL (ref 31.5–35.7)
MCV RBC AUTO: 72.5 FL (ref 79–97)
PLATELET # BLD AUTO: 588 10*3/MM3 (ref 140–450)
PMV BLD AUTO: 8.6 FL (ref 6–12)
POTASSIUM SERPL-SCNC: 3.7 MMOL/L (ref 3.5–5.2)
PROT SERPL-MCNC: 8.1 G/DL (ref 6–8.5)
RBC # BLD AUTO: 5.57 10*6/MM3 (ref 3.77–5.28)
SODIUM SERPL-SCNC: 140 MMOL/L (ref 136–145)
WBC NRBC COR # BLD AUTO: 13.45 10*3/MM3 (ref 3.4–10.8)

## 2025-05-20 PROCEDURE — 80053 COMPREHEN METABOLIC PANEL: CPT

## 2025-05-20 PROCEDURE — 85027 COMPLETE CBC AUTOMATED: CPT

## 2025-05-20 PROCEDURE — 36415 COLL VENOUS BLD VENIPUNCTURE: CPT

## 2025-05-20 PROCEDURE — 86140 C-REACTIVE PROTEIN: CPT

## 2025-05-20 NOTE — PROGRESS NOTES
Transitional Care Follow Up Visit  Subjective       Maribel Whitaker is a 49 y.o. female who presents for a transitional care management visit.    Within 48 business hours after discharge our office contacted her via telephone to coordinate her care and needs.      I reviewed and discussed the details of that call along with the discharge summary, hospital problems, inpatient lab results, inpatient diagnostic studies, and consultation reports with Maribel.     Current outpatient and discharge medications have been reconciled for the patient.  Reviewed by: KAYLYNN Fowler          5/18/2025     5:00 PM   Date of TCM Phone Call   [facility]   Date of Admission 5/16/2025   Date of Discharge 5/18/2025   Discharge Disposition Home or Self Care     Risk for Readmission (LACE) Score: 6 (5/18/2025  5:00 AM)      History of Present Illness   Course During Hospital Stay:      History of Present Illness  The patient is a 49-year-old female who presents today for transitional care management. She was admitted to [facility] from 05/16/2025 through 05/18/2025 due to acute renal failure, acute cystitis with hematuria, acute sinusitis, metabolic encephalopathy, and vertigo.    She presented to the emergency department with complaints of severe vertigo that had worsened over the course of 3 days. She presented to urgent care via wheelchair. At that time, her  reported that she had poor memory, difficulty speaking, falling asleep quickly, and she reported blurred vision. It is documented that she was unkempt with clothes on inside out when she presented to urgent care with concerns of a CVA. She was transported to the emergency department for further evaluation. Emergency department workup revealed symptoms of sepsis, acute kidney injury with creatinine of 3.83. She also had bandemia of 12%, UTI, and acute sinusitis. At the time of the assessment, she did not arouse with sternal rub. With  continued stimuli, sternal rub, patting upper extremity, she finally awakened and said she was at work that day although the provider at that time had no way of verifying, documentation did reveal that she had been too sick for work for the last 3 days and her  was not available at that time. After awakening, she was oriented x3.    It was also felt that her home sedating medications were likely contributing to her somnolence. It was felt that sepsis was less likely as the initial presentation secondary to acute kidney injury and use of multiple sedating medications. Urinary tract infection was noted on routine urinalysis. However, she had no urinary symptoms of infection. She did receive 2 days of therapy of Rocephin. Blood culture was positive for Staphylococcus coagulase, felt to be a contaminant. Initial workup did reveal acute sinusitis, which could also be causing dizziness. She was still complaining of popping in her right ear at the time of near discharge. She was sent home with an additional 3 days of Augmentin, Flonase, and Zyrtec. She was also found to have iron deficiency and was treated with IV replacement to improve fatigability. It was recommended to hold all sedating medications until follow-up and drink at least 32 ounces of water per day to prevent recurrence of kidney failure. She did verbalize understanding at the time of discharge.    She reports persistent symptoms of photophobia and hallucinations. She has been experiencing confusion, as evidenced by an episode two nights ago where she awoke screaming, believing her legs had dislocated. This incident has led to sleep disturbances. A similar episode occurred last night while watching television with her daughter. She also reports increased confusion compared to her baseline. She has been adhering to the recommended fluid intake of 32 ounces per day but notes that her gastric sleeve may prolong the process. She continues to experience  "aural fullness, a symptom she has not encountered in several years. She describes a sensation of being \"out of sorts\". Despite these symptoms, she reports feeling generally normal outside of these episodes. She expresses concern about her ability to return to work and requests assistance with [place] paperwork.     Her congestion has improved following the administration of amoxicillin, although it has been accompanied by increased expectoration.     The following portions of the patient's history were reviewed and updated as appropriate: allergies, current medications, past family history, past medical history, past social history, past surgical history, and problem list.    Review of Systems   HENT:  Negative for congestion and hearing loss.    Eyes:  Positive for photophobia.   Respiratory:  Negative for chest tightness and shortness of breath.    Cardiovascular:  Negative for chest pain and palpitations.   Neurological:  Positive for dizziness.   Psychiatric/Behavioral:  Positive for hallucinations.        Objective   /99 (BP Location: Left arm, Patient Position: Sitting, Cuff Size: Other (Comment))   Pulse 107   Temp 97 °F (36.1 °C) (Temporal)   Resp 16   Ht 157.5 cm (62\")   Wt 90.3 kg (199 lb)   SpO2 97%   BMI 36.40 kg/m²   Physical Exam  Constitutional:       Appearance: Normal appearance.   HENT:      Right Ear: A middle ear effusion is present. Tympanic membrane is not erythematous or bulging.      Left Ear:  No middle ear effusion. Tympanic membrane is not erythematous or bulging.   Cardiovascular:      Rate and Rhythm: Normal rate and regular rhythm.   Pulmonary:      Effort: Pulmonary effort is normal.      Breath sounds: Normal breath sounds.   Skin:     General: Skin is warm and dry.   Neurological:      Mental Status: She is alert.      GCS: GCS eye subscore is 4. GCS verbal subscore is 5. GCS motor subscore is 6.      Cranial Nerves: Cranial nerves 2-12 are intact. No cranial nerve deficit. "      Motor: No weakness.   Psychiatric:         Mood and Affect: Mood normal.         Behavior: Behavior normal.         Thought Content: Thought content normal.         Judgment: Judgment normal.       Physical Exam  Ears: Lots of fluid noted in the ears.  Eyes: Pupils reacting nicely.    Assessment & Plan   Diagnoses and all orders for this visit:    1. Acute renal failure, unspecified acute renal failure type (Primary)  -     Comprehensive Metabolic Panel; Future  -     CBC (No Diff); Future  -     C-reactive protein; Future    2. Urinary tract infection with hematuria, site unspecified  -     Comprehensive Metabolic Panel; Future  -     CBC (No Diff); Future  -     amoxicillin-clavulanate (AUGMENTIN) 875-125 MG per tablet; Take 1 tablet by mouth 2 (Two) Times a Day with food.  Dispense: 8 tablet; Refill: 0  -     C-reactive protein; Future    3. Acute non-recurrent sinusitis, unspecified location  -     Comprehensive Metabolic Panel; Future  -     CBC (No Diff); Future  -     amoxicillin-clavulanate (AUGMENTIN) 875-125 MG per tablet; Take 1 tablet by mouth 2 (Two) Times a Day with food.  Dispense: 8 tablet; Refill: 0  -     C-reactive protein; Future    4. Metabolic encephalopathy  -     Comprehensive Metabolic Panel; Future  -     CBC (No Diff); Future  -     Blood Culture - Blood,; Future    5. Hallucination  -     Blood Culture - Blood,; Future  -     C-reactive protein; Future      Assessment & Plan  1. Post-hospitalization follow-up.  - Recent hospitalization due to acute renal failure, acute cystitis with hematuria, acute sinusitis, metabolic encephalopathy, and vertigo.  - Presented with severe vertigo, poor memory, difficulty speaking, and blurred vision. Emergency department workup revealed sepsis, acute kidney injury, UTI, and acute sinusitis.  - Received 2 days of Rocephin and discharged with an additional 3 days of Augmentin, Flonase, and Zyrtec. Treated for iron deficiency with IV replacement.  -  Recommended to hold all sedating medications until follow-up and drink at least 32 ounces of water per day to prevent recurrence of kidney failure. Verbalized understanding at the time of discharge.    2. Acute renal failure.  - Kidney function significantly impaired during hospitalization but returned to normal by 05/18/2025.  - Advised to drink at least 64 ounces of water daily to prevent recurrence.  - Labs will be repeated to ensure kidney function remains stable.    3. Acute cystitis with hematuria.  - Urinary tract infection noted on routine urinalysis but had no urinary symptoms. Received 2 days of Rocephin and discharged with an additional 3 days of Augmentin.  - An additional 4 days of Augmentin will be prescribed to ensure the infection is fully treated.    4. Acute sinusitis.  - Initial workup revealed acute sinusitis, which could be causing dizziness. Discharged with Flonase and Zyrtec.  - Reports improvement in congestion after taking amoxicillin. An additional 4 days of Augmentin will be prescribed.    5. Metabolic encephalopathy.  - Experienced confusion and hallucinations likely due to a combination of infections and sedating medications.  - Advised to hold all sedating medications until follow-up.  - Labs will be repeated to ensure no continued infection or other abnormalities.    6. Vertigo.  - Presented with severe vertigo, which has improved but still experiences some dizziness and ear popping. This could be related to fluid in the ear.  - Continued monitoring and follow-up are necessary.    7. Iron deficiency.  - Treated with IV iron replacement during hospitalization to improve fatigability.    8. Medication management.  - Baclofen will remain on hold until further evaluation by her pain management providers.    Follow-up  - The patient will follow up in 1 week.             Patient or patient representative verbalized consent for the use of Ambient Listening during the visit with  Mai  KAYLYNN Dubon for chart documentation. 5/20/2025  15:36 CDT

## 2025-05-21 ENCOUNTER — TELEPHONE (OUTPATIENT)
Dept: INTERNAL MEDICINE | Facility: CLINIC | Age: 50
End: 2025-05-21
Payer: COMMERCIAL

## 2025-05-21 LAB — BACTERIA SPEC AEROBE CULT: NORMAL

## 2025-05-21 NOTE — TELEPHONE ENCOUNTER
Caller: Maribel Whitaker    Relationship: Self    Best call back number:     [phone]       What is the best time to reach you: ANY    Who are you requesting to speak with (clinical staff, provider,  specific staff member): CLINICAL OR PROVIDER        What was the call regarding: PLEASE CALL PATIENT BACK TO LET HER KNOW IF SHE CAN DO HER NASAL TREATMENTS THIS WEEK OR WAIT.

## 2025-05-22 ENCOUNTER — LAB (OUTPATIENT)
Dept: LAB | Facility: HOSPITAL | Age: 50
End: 2025-05-22
Payer: COMMERCIAL

## 2025-05-22 DIAGNOSIS — R31.9 URINARY TRACT INFECTION WITH HEMATURIA, SITE UNSPECIFIED: ICD-10-CM

## 2025-05-22 DIAGNOSIS — G93.41 METABOLIC ENCEPHALOPATHY: ICD-10-CM

## 2025-05-22 DIAGNOSIS — R44.3 HALLUCINATION: ICD-10-CM

## 2025-05-22 DIAGNOSIS — J01.90 ACUTE NON-RECURRENT SINUSITIS, UNSPECIFIED LOCATION: ICD-10-CM

## 2025-05-22 DIAGNOSIS — N39.0 URINARY TRACT INFECTION WITH HEMATURIA, SITE UNSPECIFIED: ICD-10-CM

## 2025-05-22 DIAGNOSIS — N17.9 ACUTE RENAL FAILURE, UNSPECIFIED ACUTE RENAL FAILURE TYPE: ICD-10-CM

## 2025-05-22 LAB
ALBUMIN SERPL-MCNC: 4 G/DL (ref 3.5–5.2)
ALBUMIN/GLOB SERPL: 1.3 G/DL
ALP SERPL-CCNC: 99 U/L (ref 39–117)
ALT SERPL W P-5'-P-CCNC: 11 U/L (ref 1–33)
ANION GAP SERPL CALCULATED.3IONS-SCNC: 13 MMOL/L (ref 5–15)
AST SERPL-CCNC: 15 U/L (ref 1–32)
BILIRUB SERPL-MCNC: 0.2 MG/DL (ref 0–1.2)
BUN SERPL-MCNC: 18 MG/DL (ref 6–20)
BUN/CREAT SERPL: 25 (ref 7–25)
CALCIUM SPEC-SCNC: 9.3 MG/DL (ref 8.6–10.5)
CHLORIDE SERPL-SCNC: 102 MMOL/L (ref 98–107)
CO2 SERPL-SCNC: 26 MMOL/L (ref 22–29)
CREAT SERPL-MCNC: 0.72 MG/DL (ref 0.57–1)
DEPRECATED RDW RBC AUTO: 46 FL (ref 37–54)
EGFRCR SERPLBLD CKD-EPI 2021: 102.6 ML/MIN/1.73
ERYTHROCYTE [DISTWIDTH] IN BLOOD BY AUTOMATED COUNT: 18.6 % (ref 12.3–15.4)
GLOBULIN UR ELPH-MCNC: 3.1 GM/DL
GLUCOSE SERPL-MCNC: 110 MG/DL (ref 65–99)
HCT VFR BLD AUTO: 38.5 % (ref 34–46.6)
HGB BLD-MCNC: 12.2 G/DL (ref 12–15.9)
MCH RBC QN AUTO: 23.4 PG (ref 26.6–33)
MCHC RBC AUTO-ENTMCNC: 31.7 G/DL (ref 31.5–35.7)
MCV RBC AUTO: 73.9 FL (ref 79–97)
PLATELET # BLD AUTO: 483 10*3/MM3 (ref 140–450)
PMV BLD AUTO: 8.8 FL (ref 6–12)
POTASSIUM SERPL-SCNC: 3.7 MMOL/L (ref 3.5–5.2)
PROT SERPL-MCNC: 7.1 G/DL (ref 6–8.5)
RBC # BLD AUTO: 5.21 10*6/MM3 (ref 3.77–5.28)
SODIUM SERPL-SCNC: 141 MMOL/L (ref 136–145)
WBC NRBC COR # BLD AUTO: 12.15 10*3/MM3 (ref 3.4–10.8)

## 2025-05-22 PROCEDURE — 87040 BLOOD CULTURE FOR BACTERIA: CPT

## 2025-05-22 PROCEDURE — 80053 COMPREHEN METABOLIC PANEL: CPT

## 2025-05-22 PROCEDURE — 85027 COMPLETE CBC AUTOMATED: CPT

## 2025-05-22 PROCEDURE — 36415 COLL VENOUS BLD VENIPUNCTURE: CPT

## 2025-05-27 ENCOUNTER — OFFICE VISIT (OUTPATIENT)
Dept: INTERNAL MEDICINE | Facility: CLINIC | Age: 50
End: 2025-05-27
Payer: COMMERCIAL

## 2025-05-27 VITALS
SYSTOLIC BLOOD PRESSURE: 134 MMHG | DIASTOLIC BLOOD PRESSURE: 78 MMHG | HEIGHT: 62 IN | BODY MASS INDEX: 37.06 KG/M2 | RESPIRATION RATE: 16 BRPM | OXYGEN SATURATION: 98 % | HEART RATE: 75 BPM | WEIGHT: 201.4 LBS

## 2025-05-27 DIAGNOSIS — N17.9 ACUTE RENAL FAILURE, UNSPECIFIED ACUTE RENAL FAILURE TYPE: Primary | ICD-10-CM

## 2025-05-27 DIAGNOSIS — I10 ESSENTIAL HYPERTENSION: ICD-10-CM

## 2025-05-27 DIAGNOSIS — H69.92 DYSFUNCTION OF LEFT EUSTACHIAN TUBE: ICD-10-CM

## 2025-05-27 PROBLEM — R40.0 INTERMITTENT DROWSINESS: Status: RESOLVED | Noted: 2025-05-16 | Resolved: 2025-05-27

## 2025-05-27 PROBLEM — N30.01 ACUTE CYSTITIS WITH HEMATURIA: Status: RESOLVED | Noted: 2025-05-16 | Resolved: 2025-05-27

## 2025-05-27 PROBLEM — H93.11 TINNITUS OF RIGHT EAR: Status: RESOLVED | Noted: 2020-04-15 | Resolved: 2025-05-27

## 2025-05-27 PROBLEM — J01.90 ACUTE SINUSITIS: Status: RESOLVED | Noted: 2025-05-16 | Resolved: 2025-05-27

## 2025-05-27 LAB — BACTERIA SPEC AEROBE CULT: NORMAL

## 2025-05-27 PROCEDURE — 99213 OFFICE O/P EST LOW 20 MIN: CPT | Performed by: INTERNAL MEDICINE

## 2025-05-27 NOTE — PROGRESS NOTES
CC: f/u renal failure and return to work    History:  Maribel Whitaker is a 49 y.o. female   History of Present Illness  The patient came in to check on her balance problems, hearing issues, and cough.    She has had balance problems for years and her hearing wasn't great in her right ear. After getting IV antibiotics, her hearing got better but is still muffled, and now her left ear is muffled too. She isn't feeling dizzy anymore and doesn't have ringing in her ears. She is using Flonase nasal spray to help with the pressure and drainage.    She has a mild cough and is currently taking Augmentin, with a few days left on the medication. She is back on her regular medicines and hasn't had any more episodes of confusion. She does feel ready to return to work.         ROS:  Review of Systems   Constitutional:  Negative for fatigue and fever.   Respiratory:  Negative for shortness of breath.    Cardiovascular:  Negative for chest pain.   Neurological:  Negative for dizziness.        reports that she has never smoked. She has never been exposed to tobacco smoke. She has never used smokeless tobacco. She reports that she does not drink alcohol and does not use drugs.      Current Outpatient Medications:     Accu-Chek FastClix Lancets misc, Use 1 each twice daily, Disp: 102 each, Rfl: 11    amoxicillin-clavulanate (AUGMENTIN) 875-125 MG per tablet, Take 1 tablet by mouth 2 (Two) Times a Day with food., Disp: 8 tablet, Rfl: 0    baclofen (LIORESAL) 10 MG tablet, Take 1 tablet by mouth 3 (Three) Times a Day As Needed. (Patient taking differently: Take 1 tablet by mouth 3 (Three) Times a Day As Needed for Muscle Spasms.), Disp: 90 tablet, Rfl: 1    cetirizine (zyrTEC) 10 MG tablet, Take 1 tablet by mouth Daily., Disp: , Rfl:     Cholecalciferol (Vitamin D) 50 MCG (2000 UT) tablet, Take 1 tablet by mouth Daily., Disp: 90 tablet, Rfl: 1    DULoxetine (Cymbalta) 60 MG capsule, Take 2 capsules by mouth Daily., Disp: 60  "capsule, Rfl: 3    Esketamine HCl (SPRAVATO, 84 MG DOSE, NA), Administer  into the nostril(s) as directed by provider 1 (One) Time Per Week., Disp: , Rfl:     Ferrous Sulfate (IRON PO), Take 2 tablets by mouth Daily. Allergic to iron in tablet form but gummies are fine, Disp: , Rfl:     fluticasone (FLONASE) 50 MCG/ACT nasal spray, 2 sprays by Each Nare route Daily., Disp: , Rfl:     gabapentin (NEURONTIN) 300 MG capsule, Take 1 capsule by mouth Every 6 (Six) Hours., Disp: 120 capsule, Rfl: 1    glucose blood (Accu-Chek Guide Test) test strip, Use as instructed twice daily to test blood sugar, Disp: 100 each, Rfl: 11    glucose blood test strip, Use as instructed twice daily, Disp: 200 each, Rfl: 3    lamoTRIgine (LaMICtal) 150 MG tablet, Take 1 tablet by mouth 2 (Two) Times a Day., Disp: 60 tablet, Rfl: 3    Lancets (OneTouch Delica Plus [id number]) misc, Use as instructed twice daily, Disp: 200 each, Rfl: 3    lisinopril (PRINIVIL,ZESTRIL) 20 MG tablet, Take 1 tablet by mouth Daily., Disp: 90 tablet, Rfl: 1    meclizine (ANTIVERT) 12.5 MG tablet, Take 1 tablet by mouth 3 times a day., Disp: 30 tablet, Rfl: 0    metFORMIN (GLUCOPHAGE) 500 MG tablet, Take 1 tablet by mouth Daily With Breakfast., Disp: 90 tablet, Rfl: 1    pantoprazole (PROTONIX) 40 MG EC tablet, Take 1 tablet by mouth Daily., Disp: 90 tablet, Rfl: 1    traZODone (DESYREL) 150 MG tablet, Take 1 tablet by mouth Every Night., Disp: , Rfl:     OBJECTIVE:  /78 (BP Location: Left arm, Patient Position: Sitting, Cuff Size: Adult)   Pulse 75   Resp 16   Ht 157.5 cm (62\")   Wt 91.4 kg (201 lb 6.4 oz)   LMP  (LMP Unknown)   SpO2 98%   BMI 36.84 kg/m²    Physical Exam  Constitutional:       General: She is not in acute distress.  HENT:      Right Ear: Tympanic membrane is not erythematous, retracted or bulging.      Left Ear: Tympanic membrane is bulging. Tympanic membrane is not erythematous or retracted.   Pulmonary:      Effort: Pulmonary effort " is normal. No respiratory distress.   Neurological:      Mental Status: She is alert and oriented to person, place, and time.           Assessment/Plan     Diagnoses and all orders for this visit:    1. Acute renal failure, unspecified acute renal failure type (Primary)  Resolved and back at baseline.     2. Essential hypertension  Well controlled, BP goal for age is <140/90 per JNC 8 guidelines, and continue current medications    3. Dysfunction of left eustachian tube  Continue flonase.     Assessment & Plan  1. Balance problems: Improved.  - Improved post-IV antibiotics.  - No dizziness or lightheadedness.  - Continue Flonase nasal spray.  - Complete Augmentin course.    2. ETD  - Improved but muffled in both ears.  - No tinnitus.  - Extra fluid in ears, no infection.  - Continue Flonase nasal spray.    3. Cough: Mild.  - Complete Augmentin course.    4. Health maintenance.  - Schedule mammogram.  - Contact information for scheduling provided.    Follow-up  - Follow up in September 2025 or earlier if necessary.      An After Visit Summary was printed and given to the patient at discharge.  Return for Next scheduled follow up.      Patient or patient representative verbalized consent for the use of Ambient Listening during the visit with  Miguel Mckenzie DO for chart documentation. 5/27/2025  10:34 CDT    Miguel Mckenzie D.O. 5/27/2025   Electronically signed.

## 2025-05-28 ENCOUNTER — TELEPHONE (OUTPATIENT)
Dept: INTERNAL MEDICINE | Facility: CLINIC | Age: 50
End: 2025-05-28
Payer: COMMERCIAL

## 2025-05-28 NOTE — TELEPHONE ENCOUNTER
Mai filled out and put on my desk yesterday. I gave the paperwork to Morgan and he took it to the pt yesterday while she was checking out. Was there something more or new that she is needing.

## 2025-05-28 NOTE — TELEPHONE ENCOUNTER
Patient stated her employer is requesting a letter detailing which days she was off work.  She said she will need the last day of absence to be today (5/28).  The [employer] paperwork had her off through 5/27.  She plans to return to work tomorrow.  She was informed Mai is out of the office today.  She said it can wait until tomorrow.

## 2025-05-28 NOTE — TELEPHONE ENCOUNTER
Please contact patient and remind her that we gave her the paperwork. This should suffice for her time off and any further paperwork that is needed would need to come from [employer], but what she got should be adequate.

## 2025-05-28 NOTE — TELEPHONE ENCOUNTER
Caller: Maribel Whitaker Tara    Relationship: Self    Best call back number: [phone]     What form or medical record are you requesting: EXCUSE    Who is requesting this form or medical record from you: WORK    How would you like to receive the form or medical records (pick-up, mail, fax): PICK-UP OR MYCHART    If pick-up, provide patient with address and location details    Timeframe paperwork needed: ASAP    Additional notes: PATIENT STATED SHE HAS BEEN OUT OF WORK SINCE 05.16.2025 DUE TO AN ILLNESS. SHE STATED SHE IS NEEDS A NOT EXCUSING HER FROM WORK 05.16.2025-05.28.2025.    PLEASE POST ON [employer], IF UNABLE TO DO PLEASE CALL WHEN READY FOR PICK-UP

## 2025-06-17 RX ORDER — TRAZODONE HYDROCHLORIDE 150 MG/1
150 TABLET ORAL NIGHTLY
OUTPATIENT
Start: 2025-06-17

## 2025-06-23 NOTE — Clinical Note
[facility] EMERGENCY DEPARTMENT  [address] [place] [address] AVE  [facility] [address]  Phone: [phone]    Maribel Whitaker was seen and treated in our emergency department on 12/20/2024.  She may return to work on 12/22/2024.  COVID-19 exposure symptoms retest in 2 days       Thank you for choosing [facility].    Sterling Montemayor Jr., MD       Myriam called back and I relayed message

## 2025-06-30 RX ORDER — CHOLECALCIFEROL (VITAMIN D3) 50 MCG
2000 TABLET ORAL DAILY
Qty: 90 TABLET | Refills: 1 | Status: SHIPPED | OUTPATIENT
Start: 2025-06-30

## 2025-06-30 RX ORDER — CHOLECALCIFEROL (VITAMIN D3) 50 MCG
2000 TABLET ORAL DAILY
Qty: 90 TABLET | Refills: 1 | OUTPATIENT
Start: 2025-06-30